# Patient Record
Sex: MALE | Race: WHITE | NOT HISPANIC OR LATINO | Employment: OTHER | URBAN - METROPOLITAN AREA
[De-identification: names, ages, dates, MRNs, and addresses within clinical notes are randomized per-mention and may not be internally consistent; named-entity substitution may affect disease eponyms.]

---

## 2023-02-22 LAB
ALANINE AMINOTRANSFERASE (SGPT) (U/L) IN SER/PLAS: 20 U/L (ref 10–52)
ALBUMIN (G/DL) IN SER/PLAS: 4.4 G/DL (ref 3.4–5)
ALKALINE PHOSPHATASE (U/L) IN SER/PLAS: 59 U/L (ref 33–136)
ANION GAP IN SER/PLAS: 9 MMOL/L (ref 10–20)
ASPARTATE AMINOTRANSFERASE (SGOT) (U/L) IN SER/PLAS: 19 U/L (ref 9–39)
BILIRUBIN TOTAL (MG/DL) IN SER/PLAS: 0.6 MG/DL (ref 0–1.2)
CALCIUM (MG/DL) IN SER/PLAS: 9.7 MG/DL (ref 8.6–10.3)
CARBON DIOXIDE, TOTAL (MMOL/L) IN SER/PLAS: 30 MMOL/L (ref 21–32)
CHLORIDE (MMOL/L) IN SER/PLAS: 102 MMOL/L (ref 98–107)
CHOLESTEROL (MG/DL) IN SER/PLAS: 160 MG/DL (ref 0–199)
CHOLESTEROL IN HDL (MG/DL) IN SER/PLAS: 57.7 MG/DL
CHOLESTEROL/HDL RATIO: 2.8
CREATININE (MG/DL) IN SER/PLAS: 1.01 MG/DL (ref 0.5–1.3)
ERYTHROCYTE DISTRIBUTION WIDTH (RATIO) BY AUTOMATED COUNT: 12.5 % (ref 11.5–14.5)
ERYTHROCYTE MEAN CORPUSCULAR HEMOGLOBIN CONCENTRATION (G/DL) BY AUTOMATED: 32.9 G/DL (ref 32–36)
ERYTHROCYTE MEAN CORPUSCULAR VOLUME (FL) BY AUTOMATED COUNT: 93 FL (ref 80–100)
ERYTHROCYTES (10*6/UL) IN BLOOD BY AUTOMATED COUNT: 4.49 X10E12/L (ref 4.5–5.9)
GFR MALE: 81 ML/MIN/1.73M2
GLUCOSE (MG/DL) IN SER/PLAS: 102 MG/DL (ref 74–99)
HEMATOCRIT (%) IN BLOOD BY AUTOMATED COUNT: 41.6 % (ref 41–52)
HEMOGLOBIN (G/DL) IN BLOOD: 13.7 G/DL (ref 13.5–17.5)
LDL: 79 MG/DL (ref 0–99)
LEUKOCYTES (10*3/UL) IN BLOOD BY AUTOMATED COUNT: 6.7 X10E9/L (ref 4.4–11.3)
PLATELETS (10*3/UL) IN BLOOD AUTOMATED COUNT: 234 X10E9/L (ref 150–450)
POTASSIUM (MMOL/L) IN SER/PLAS: 4.2 MMOL/L (ref 3.5–5.3)
PROSTATE SPECIFIC ANTIGEN,SCREEN: 1.94 NG/ML (ref 0–4)
PROTEIN TOTAL: 7.4 G/DL (ref 6.4–8.2)
SODIUM (MMOL/L) IN SER/PLAS: 137 MMOL/L (ref 136–145)
TRIGLYCERIDE (MG/DL) IN SER/PLAS: 115 MG/DL (ref 0–149)
UREA NITROGEN (MG/DL) IN SER/PLAS: 16 MG/DL (ref 6–23)
VLDL: 23 MG/DL (ref 0–40)

## 2023-03-17 DIAGNOSIS — I10 HYPERTENSION, UNSPECIFIED TYPE: ICD-10-CM

## 2023-03-17 RX ORDER — METOPROLOL SUCCINATE 25 MG/1
25 TABLET, EXTENDED RELEASE ORAL DAILY
Qty: 90 TABLET | Refills: 3 | Status: SHIPPED | OUTPATIENT
Start: 2023-03-17 | End: 2023-06-05 | Stop reason: SDUPTHER

## 2023-03-17 RX ORDER — METOPROLOL SUCCINATE 25 MG/1
25 TABLET, EXTENDED RELEASE ORAL DAILY
COMMUNITY
Start: 2023-02-23 | End: 2023-03-17 | Stop reason: SDUPTHER

## 2023-05-04 DIAGNOSIS — N52.9 ERECTILE DYSFUNCTION, UNSPECIFIED ERECTILE DYSFUNCTION TYPE: ICD-10-CM

## 2023-05-04 RX ORDER — TADALAFIL 20 MG/1
20 TABLET ORAL DAILY PRN
COMMUNITY
Start: 2023-02-23 | End: 2023-05-04 | Stop reason: SDUPTHER

## 2023-05-04 RX ORDER — TADALAFIL 20 MG/1
20 TABLET ORAL DAILY PRN
Qty: 10 TABLET | Refills: 3 | Status: SHIPPED | OUTPATIENT
Start: 2023-05-04 | End: 2023-05-10 | Stop reason: SDUPTHER

## 2023-05-09 ENCOUNTER — TELEPHONE (OUTPATIENT)
Dept: PRIMARY CARE | Facility: CLINIC | Age: 68
End: 2023-05-09
Payer: COMMERCIAL

## 2023-05-10 ENCOUNTER — OFFICE VISIT (OUTPATIENT)
Dept: PRIMARY CARE | Facility: CLINIC | Age: 68
End: 2023-05-10
Payer: COMMERCIAL

## 2023-05-10 VITALS
HEIGHT: 71 IN | RESPIRATION RATE: 16 BRPM | WEIGHT: 238 LBS | TEMPERATURE: 98.3 F | SYSTOLIC BLOOD PRESSURE: 142 MMHG | DIASTOLIC BLOOD PRESSURE: 82 MMHG | BODY MASS INDEX: 33.32 KG/M2 | HEART RATE: 84 BPM

## 2023-05-10 DIAGNOSIS — M54.31 SCIATICA OF RIGHT SIDE: Primary | ICD-10-CM

## 2023-05-10 DIAGNOSIS — N52.9 ERECTILE DYSFUNCTION, UNSPECIFIED ERECTILE DYSFUNCTION TYPE: ICD-10-CM

## 2023-05-10 PROCEDURE — 3077F SYST BP >= 140 MM HG: CPT | Performed by: FAMILY MEDICINE

## 2023-05-10 PROCEDURE — 1159F MED LIST DOCD IN RCRD: CPT | Performed by: FAMILY MEDICINE

## 2023-05-10 PROCEDURE — 99214 OFFICE O/P EST MOD 30 MIN: CPT | Performed by: FAMILY MEDICINE

## 2023-05-10 PROCEDURE — 3079F DIAST BP 80-89 MM HG: CPT | Performed by: FAMILY MEDICINE

## 2023-05-10 PROCEDURE — 1036F TOBACCO NON-USER: CPT | Performed by: FAMILY MEDICINE

## 2023-05-10 RX ORDER — MELOXICAM 15 MG/1
15 TABLET ORAL DAILY
Qty: 14 TABLET | Refills: 0 | Status: SHIPPED | OUTPATIENT
Start: 2023-05-10 | End: 2023-05-24

## 2023-05-10 RX ORDER — FEXOFENADINE HCL AND PSEUDOEPHEDRINE HCI 60; 120 MG/1; MG/1
1 TABLET, EXTENDED RELEASE ORAL EVERY 12 HOURS
COMMUNITY

## 2023-05-10 RX ORDER — CYCLOBENZAPRINE HCL 10 MG
1 TABLET ORAL 3 TIMES DAILY
COMMUNITY

## 2023-05-10 RX ORDER — TADALAFIL 20 MG/1
20 TABLET ORAL DAILY PRN
Qty: 10 TABLET | Refills: 3 | Status: SHIPPED | OUTPATIENT
Start: 2023-05-10

## 2023-05-10 RX ORDER — ACETAMINOPHEN 500 MG
100 TABLET ORAL
COMMUNITY
Start: 2023-02-23

## 2023-05-10 RX ORDER — LOSARTAN POTASSIUM 100 MG/1
1 TABLET ORAL DAILY
COMMUNITY
End: 2023-12-11

## 2023-05-10 RX ORDER — MELOXICAM 7.5 MG/1
1 TABLET ORAL DAILY
COMMUNITY
End: 2023-12-11

## 2023-05-10 RX ORDER — ROSUVASTATIN CALCIUM 40 MG/1
1 TABLET, COATED ORAL DAILY
COMMUNITY
End: 2023-12-11

## 2023-05-10 RX ORDER — MULTIVITAMIN
1 TABLET ORAL DAILY
COMMUNITY

## 2023-05-10 ASSESSMENT — ENCOUNTER SYMPTOMS: BACK PAIN: 1

## 2023-05-10 NOTE — PROGRESS NOTES
Subjective   Dinh Parisi is a 67 y.o. male who presents for Back Pain.  Back Pain  This is a new problem. Episode onset: 2 weeks ago. The problem occurs constantly. The problem is unchanged. The pain is present in the lumbar spine. Radiates to: right leg.   He reports an episode 3 weeks ago where he was carrying a heavy casket which is unusual for him.  Shortly after that he developed pain in the right lower back and buttock area radiating down the lateral thigh all the way to the foot with some intermittent numbness and tingling sensation.  The pain is much worse on standing and gets worse with prolonged standing.  It is relieved almost immediately with sitting and is not bothering him on lying down.  He does not report any prior history of back disease or injury.  Visit Vitals  /82   Pulse 84   Temp 36.8 °C (98.3 °F)   Resp 16      Objective   Physical Exam  Constitutional:       Appearance: Normal appearance.   Pulmonary:      Effort: Pulmonary effort is normal.   Musculoskeletal:      Cervical back: No edema, erythema or tenderness. Normal range of motion.      Thoracic back: No spasms, tenderness or bony tenderness. Normal range of motion.      Lumbar back: No spasms, tenderness or bony tenderness. Normal range of motion. Positive right straight leg raise test. Negative left straight leg raise test.   Skin:     General: Skin is warm and dry.         Assessment/Plan    Problem List Items Addressed This Visit       Erectile dysfunction    Relevant Medications    tadalafil 20 mg tablet     Other Visit Diagnoses       Sciatica of right side    -  Primary    Relevant Medications    meloxicam (Mobic) 15 mg tablet        Clinical exam is highly consistent with sciatica of the right side.  Because this is worse on standing and better on sitting, it is likely that it is a muscle spasm rather than lumbar disc disease.  The pattern is consistent with piriformis which could have been exacerbated by carrying the  clemencia 3 weeks ago.  We will increase the dose of meloxicam to 15 mg for the next 2 weeks, add Flexeril as needed.  I am recommending heat and stretching exercises and have given him a handout for lumbar back exercises.  If this does not resolve the pain within the next 1 to 2 weeks we would do a formal physical therapy referral

## 2023-05-31 ENCOUNTER — TELEPHONE (OUTPATIENT)
Dept: PRIMARY CARE | Facility: CLINIC | Age: 68
End: 2023-05-31
Payer: COMMERCIAL

## 2023-05-31 DIAGNOSIS — M54.31 SCIATICA OF RIGHT SIDE: Primary | ICD-10-CM

## 2023-06-01 NOTE — TELEPHONE ENCOUNTER
I placed a phone call to Dinh Parisi.  We discussed the fact that physical therapy is generally done before considering an epidural steroid injection.  This is due to the fact that studies show the majority of patients will resolve their symptoms with physical therapy and do not need to move to epidural injection.  I advised him to give this 2 to 3 weeks.  If he sees no improvement at all then we we will expect a message back and initiate imaging and referral to pain management

## 2023-06-05 DIAGNOSIS — I10 HYPERTENSION, UNSPECIFIED TYPE: ICD-10-CM

## 2023-06-05 RX ORDER — METOPROLOL SUCCINATE 25 MG/1
37.5 TABLET, EXTENDED RELEASE ORAL DAILY
Qty: 135 TABLET | Refills: 3 | Status: SHIPPED | OUTPATIENT
Start: 2023-06-05 | End: 2023-07-25 | Stop reason: SDUPTHER

## 2023-07-13 ENCOUNTER — HOSPITAL ENCOUNTER (OUTPATIENT)
Dept: DATA CONVERSION | Facility: HOSPITAL | Age: 68
End: 2023-07-13
Attending: PAIN MEDICINE | Admitting: PAIN MEDICINE
Payer: COMMERCIAL

## 2023-07-13 DIAGNOSIS — M54.16 RADICULOPATHY, LUMBAR REGION: ICD-10-CM

## 2023-07-13 DIAGNOSIS — Z87.891 PERSONAL HISTORY OF NICOTINE DEPENDENCE: ICD-10-CM

## 2023-07-13 DIAGNOSIS — I10 ESSENTIAL (PRIMARY) HYPERTENSION: ICD-10-CM

## 2023-07-13 DIAGNOSIS — G47.30 SLEEP APNEA, UNSPECIFIED: ICD-10-CM

## 2023-07-13 DIAGNOSIS — E78.00 PURE HYPERCHOLESTEROLEMIA, UNSPECIFIED: ICD-10-CM

## 2023-07-13 DIAGNOSIS — Z96.659 PRESENCE OF UNSPECIFIED ARTIFICIAL KNEE JOINT: ICD-10-CM

## 2023-07-25 DIAGNOSIS — I10 HYPERTENSION, UNSPECIFIED TYPE: ICD-10-CM

## 2023-07-25 RX ORDER — METOPROLOL SUCCINATE 25 MG/1
37.5 TABLET, EXTENDED RELEASE ORAL DAILY
Qty: 135 TABLET | Refills: 3 | Status: SHIPPED | OUTPATIENT
Start: 2023-07-25 | End: 2024-01-29 | Stop reason: SDUPTHER

## 2023-08-24 ENCOUNTER — TELEPHONE (OUTPATIENT)
Dept: PRIMARY CARE | Facility: CLINIC | Age: 68
End: 2023-08-24
Payer: COMMERCIAL

## 2023-08-24 NOTE — TELEPHONE ENCOUNTER
Daughter called and stated pt accidentally took 2 of his Metoprolol today.  (Total of 50 vs 25)  They are monitoring him and he seems ok.  (Was a little faint earlier) But they have been with him making sure he is ok.   She just wants to make sure there is nothing else they should do?

## 2023-09-19 PROBLEM — R00.2 PALPITATIONS: Status: ACTIVE | Noted: 2023-09-19

## 2023-09-19 PROBLEM — G47.33 OBSTRUCTIVE SLEEP APNEA: Status: ACTIVE | Noted: 2023-09-19

## 2023-09-19 PROBLEM — K21.9 GASTROESOPHAGEAL REFLUX DISEASE WITHOUT ESOPHAGITIS: Status: ACTIVE | Noted: 2023-09-19

## 2023-09-19 PROBLEM — H10.9 CONJUNCTIVITIS, BACTERIAL: Status: ACTIVE | Noted: 2023-09-19

## 2023-09-19 PROBLEM — R07.89 ANTERIOR CHEST WALL PAIN: Status: ACTIVE | Noted: 2023-09-19

## 2023-09-19 PROBLEM — M54.50 LUMBAGO: Status: ACTIVE | Noted: 2023-09-19

## 2023-09-19 PROBLEM — I47.10 PAROXYSMAL SVT (SUPRAVENTRICULAR TACHYCARDIA) (CMS-HCC): Status: ACTIVE | Noted: 2023-09-19

## 2023-09-19 PROBLEM — M76.62 ACHILLES TENDINITIS OF LEFT LOWER EXTREMITY: Status: ACTIVE | Noted: 2023-09-19

## 2023-09-19 PROBLEM — J30.2 SEASONAL ALLERGIES: Status: ACTIVE | Noted: 2023-09-19

## 2023-09-19 PROBLEM — R94.31 ABNORMAL ECG: Status: ACTIVE | Noted: 2023-09-19

## 2023-09-19 PROBLEM — E78.5 HYPERLIPIDEMIA: Status: ACTIVE | Noted: 2023-09-19

## 2023-09-19 PROBLEM — R40.0 DAYTIME SOMNOLENCE: Status: ACTIVE | Noted: 2023-09-19

## 2023-09-19 RX ORDER — TRIAMCINOLONE ACETONIDE 55 UG/1
2 SPRAY, METERED NASAL DAILY
COMMUNITY

## 2023-09-19 RX ORDER — POTASSIUM CHLORIDE 750 MG/1
TABLET, FILM COATED, EXTENDED RELEASE ORAL
COMMUNITY
Start: 2023-02-23

## 2023-09-19 RX ORDER — AZELASTINE 1 MG/ML
SPRAY, METERED NASAL
COMMUNITY

## 2023-09-19 RX ORDER — ASPIRIN 81 MG/1
1 TABLET ORAL DAILY
COMMUNITY

## 2023-09-19 RX ORDER — GABAPENTIN 300 MG/1
1 CAPSULE ORAL 3 TIMES DAILY
COMMUNITY
Start: 2023-06-19

## 2023-09-29 VITALS — BODY MASS INDEX: 32.1 KG/M2 | HEIGHT: 71 IN | WEIGHT: 229.28 LBS

## 2023-10-02 NOTE — OP NOTE
PROCEDURE DETAILS    Preoperative Diagnosis:  Lumbar radicular syndrome, M54.16    Postoperative Diagnosis:  Lumbar radicular syndrome, M54.16    Surgeon: Gabriele Chery  Resident/Fellow/Other Assistant: Iris Martinez    Procedure:  Lumbar epidural steroid injection under fluoroscopic guidance     Anesthesia: No anesthesiologist associated with this case  Estimated Blood Loss: 0  Findings: None         Operative Report:       Operation  Midline lumbar epidural steroid injection. Level performed L4-L5     Surgical indications  The patient is here today to receive an epidural steroid injection to assist with pain.    Operative report  The patient was taken to the procedure room, was placed into a prone positioning. The lumbar area was prepped and draped sterilely in the normal fashion. Under fluoroscopic guidance the epidural space was identified. The skin and subcutaneous tissue was  anesthetized with 1% lidocaine. An 18-gauge Tuohy needle was introduced using the normal saline loss-of-resistance technique. Once the loss of resistance had been achieved, final positioning of the needle was confirmed with negative aspiration of heme  and CSF, with the injection of contrast material showing spread in the epidural space, confirmed in AP and lateral view. Then the patient received 80 mg of Depo-Medrol diluted into normal saline preservative-free and 2 mL of 0.25% bupivacaine making total  volume of 8 mL. The patient tolerated the procedure well, was taken to the recovery room, allowed to recover sufficient amount of time and then was discharged home in stable condition. The patient was advised to [facility]up with [facility]  to reassess improvement on as-needed basis.    Thank you for allowing me to participate in the care of this patient.                        Attestation:   Note Completion:  Attending Attestation I performed the procedure without a resident         Electronic Signatures:  Vik  Gabriele MENDOZA)  (Signed 13-Jul-2023 10:08)   Authored: Post-Operative Note, Chart Review, Note Completion      Last Updated: 13-Jul-2023 10:08 by Gabriele Chery)

## 2023-10-19 ENCOUNTER — APPOINTMENT (OUTPATIENT)
Dept: CARDIOLOGY | Facility: CLINIC | Age: 68
End: 2023-10-19
Payer: COMMERCIAL

## 2023-12-09 DIAGNOSIS — G89.29 CHRONIC BILATERAL LOW BACK PAIN, UNSPECIFIED WHETHER SCIATICA PRESENT: Primary | ICD-10-CM

## 2023-12-09 DIAGNOSIS — M54.50 CHRONIC BILATERAL LOW BACK PAIN, UNSPECIFIED WHETHER SCIATICA PRESENT: Primary | ICD-10-CM

## 2023-12-09 DIAGNOSIS — E78.5 HYPERLIPIDEMIA, UNSPECIFIED HYPERLIPIDEMIA TYPE: ICD-10-CM

## 2023-12-09 DIAGNOSIS — I15.9 SECONDARY HYPERTENSION: ICD-10-CM

## 2023-12-11 RX ORDER — LOSARTAN POTASSIUM 100 MG/1
100 TABLET ORAL DAILY
Qty: 90 TABLET | Refills: 3 | Status: SHIPPED | OUTPATIENT
Start: 2023-12-11 | End: 2024-05-01 | Stop reason: SDUPTHER

## 2023-12-11 RX ORDER — ROSUVASTATIN CALCIUM 40 MG/1
40 TABLET, COATED ORAL DAILY
Qty: 90 TABLET | Refills: 3 | Status: SHIPPED | OUTPATIENT
Start: 2023-12-11 | End: 2024-05-01 | Stop reason: SDUPTHER

## 2023-12-11 RX ORDER — MELOXICAM 7.5 MG/1
7.5 TABLET ORAL DAILY
Qty: 90 TABLET | Refills: 3 | Status: SHIPPED | OUTPATIENT
Start: 2023-12-11 | End: 2024-05-01 | Stop reason: SDUPTHER

## 2024-01-29 DIAGNOSIS — I10 HYPERTENSION, UNSPECIFIED TYPE: ICD-10-CM

## 2024-01-29 RX ORDER — METOPROLOL SUCCINATE 25 MG/1
37.5 TABLET, EXTENDED RELEASE ORAL DAILY
Qty: 135 TABLET | Refills: 3 | Status: SHIPPED | OUTPATIENT
Start: 2024-01-29 | End: 2024-01-30 | Stop reason: SDUPTHER

## 2024-01-30 DIAGNOSIS — I10 HYPERTENSION, UNSPECIFIED TYPE: ICD-10-CM

## 2024-01-30 RX ORDER — METOPROLOL SUCCINATE 25 MG/1
50 TABLET, EXTENDED RELEASE ORAL DAILY
Qty: 180 TABLET | Refills: 3 | Status: SHIPPED | OUTPATIENT
Start: 2024-01-30 | End: 2024-05-01 | Stop reason: SDUPTHER

## 2024-02-27 ENCOUNTER — TELEPHONE (OUTPATIENT)
Dept: PRIMARY CARE | Facility: CLINIC | Age: 69
End: 2024-02-27
Payer: COMMERCIAL

## 2024-02-27 NOTE — TELEPHONE ENCOUNTER
"Pt is having a dental procedure this afternoon called root planing and he is requesting an antibiotic \"to be safe.\" Please advise.  "

## 2024-05-01 ENCOUNTER — TELEPHONE (OUTPATIENT)
Dept: PRIMARY CARE | Facility: CLINIC | Age: 69
End: 2024-05-01
Payer: COMMERCIAL

## 2024-05-01 DIAGNOSIS — I15.9 SECONDARY HYPERTENSION: ICD-10-CM

## 2024-05-01 DIAGNOSIS — I10 HYPERTENSION, UNSPECIFIED TYPE: ICD-10-CM

## 2024-05-01 DIAGNOSIS — M54.50 CHRONIC BILATERAL LOW BACK PAIN, UNSPECIFIED WHETHER SCIATICA PRESENT: ICD-10-CM

## 2024-05-01 DIAGNOSIS — E78.5 HYPERLIPIDEMIA, UNSPECIFIED HYPERLIPIDEMIA TYPE: ICD-10-CM

## 2024-05-01 DIAGNOSIS — G89.29 CHRONIC BILATERAL LOW BACK PAIN, UNSPECIFIED WHETHER SCIATICA PRESENT: ICD-10-CM

## 2024-05-01 RX ORDER — LOSARTAN POTASSIUM 100 MG/1
100 TABLET ORAL DAILY
Qty: 90 TABLET | Refills: 3 | Status: SHIPPED | OUTPATIENT
Start: 2024-05-01

## 2024-05-01 RX ORDER — METOPROLOL SUCCINATE 25 MG/1
50 TABLET, EXTENDED RELEASE ORAL DAILY
Qty: 180 TABLET | Refills: 3 | Status: SHIPPED | OUTPATIENT
Start: 2024-05-01 | End: 2025-05-01

## 2024-05-01 RX ORDER — MELOXICAM 7.5 MG/1
7.5 TABLET ORAL DAILY
Qty: 90 TABLET | Refills: 3 | Status: SHIPPED | OUTPATIENT
Start: 2024-05-01

## 2024-05-01 RX ORDER — ROSUVASTATIN CALCIUM 40 MG/1
40 TABLET, COATED ORAL DAILY
Qty: 90 TABLET | Refills: 3 | Status: SHIPPED | OUTPATIENT
Start: 2024-05-01

## 2024-05-01 NOTE — TELEPHONE ENCOUNTER
Patient stopped in stating that he would like his Toprol XL, Cozaar, Crestor, and Mobic be switched over to [facility] in [facility]. He said that he stopped at the [facility] in [facility] to have them refill his medications and he was told that we had to send over new Rx's to them. If those can be sent at your convenience please and thank you!

## 2024-07-15 ENCOUNTER — OFFICE VISIT (OUTPATIENT)
Dept: PRIMARY CARE | Facility: CLINIC | Age: 69
End: 2024-07-15
Payer: COMMERCIAL

## 2024-07-15 VITALS
WEIGHT: 225 LBS | DIASTOLIC BLOOD PRESSURE: 80 MMHG | BODY MASS INDEX: 31.38 KG/M2 | OXYGEN SATURATION: 97 % | HEART RATE: 80 BPM | SYSTOLIC BLOOD PRESSURE: 124 MMHG | TEMPERATURE: 97.5 F

## 2024-07-15 DIAGNOSIS — R21 RASH DUE TO ALLERGY: Primary | ICD-10-CM

## 2024-07-15 DIAGNOSIS — T78.40XA RASH DUE TO ALLERGY: Primary | ICD-10-CM

## 2024-07-15 PROCEDURE — 1159F MED LIST DOCD IN RCRD: CPT | Performed by: FAMILY MEDICINE

## 2024-07-15 PROCEDURE — 1036F TOBACCO NON-USER: CPT | Performed by: FAMILY MEDICINE

## 2024-07-15 PROCEDURE — 3074F SYST BP LT 130 MM HG: CPT | Performed by: FAMILY MEDICINE

## 2024-07-15 PROCEDURE — 99214 OFFICE O/P EST MOD 30 MIN: CPT | Performed by: FAMILY MEDICINE

## 2024-07-15 PROCEDURE — 3079F DIAST BP 80-89 MM HG: CPT | Performed by: FAMILY MEDICINE

## 2024-07-15 PROCEDURE — 1160F RVW MEDS BY RX/DR IN RCRD: CPT | Performed by: FAMILY MEDICINE

## 2024-07-15 RX ORDER — HYDROXYZINE HYDROCHLORIDE 10 MG/1
10 TABLET, FILM COATED ORAL EVERY 8 HOURS PRN
Qty: 30 TABLET | Refills: 0 | Status: SHIPPED | OUTPATIENT
Start: 2024-07-15 | End: 2024-07-25

## 2024-07-15 RX ORDER — METHYLPREDNISOLONE 4 MG/1
TABLET ORAL
Qty: 21 TABLET | Refills: 0 | Status: SHIPPED | OUTPATIENT
Start: 2024-07-15 | End: 2024-07-22

## 2024-07-15 ASSESSMENT — ENCOUNTER SYMPTOMS
VOMITING: 0
SORE THROAT: 0
FEVER: 0
DIARRHEA: 1

## 2024-07-15 NOTE — PROGRESS NOTES
Subjective   Patient ID: Dinh Parisi is a 68 y.o. male who presents for Rash (Red welt this morning/\took benadryl).    Rash  This is a new problem. The current episode started yesterday. The problem has been gradually worsening since onset. The affected locations include the left upper leg, left lower leg, left arm, left elbow, torso, back, right upper leg, right lower leg and right arm. The rash is characterized by redness and swelling (welted rash). It is unknown if there was an exposure to a precipitant. Associated symptoms include diarrhea. Pertinent negatives include no fever, joint pain, sore throat or vomiting. Treatments tried: benadryl. The treatment provided moderate relief.        Review of Systems   Constitutional:  Negative for fever.   HENT:  Negative for sore throat.    Gastrointestinal:  Positive for diarrhea. Negative for vomiting.   Musculoskeletal:  Negative for joint pain.   Skin:  Positive for rash.        Rash x 1 d   No new products or foods, no gardening or yard work  No travels  Di use clorox   Pt has rash on arms and legs, red and blotchy  Very itchy  Took 50 mg of benedryl last night and this am  Helped with itching and rash       Objective   /80   Pulse 80   Temp 36.4 °C (97.5 °F)   Wt 102 kg (225 lb)   SpO2 97%   BMI 31.38 kg/m²     Physical Exam  Vitals and nursing note reviewed.   Constitutional:       General: He is not in acute distress.     Appearance: Normal appearance.   HENT:      Head: Normocephalic and atraumatic.   Cardiovascular:      Rate and Rhythm: Normal rate and regular rhythm.      Heart sounds: Normal heart sounds.   Pulmonary:      Effort: Pulmonary effort is normal.      Breath sounds: Normal breath sounds.   Skin:     General: Skin is warm and dry.      Findings: Rash present.      Comments: Residual maculopapular rash on LUE x 3-4  No hives, vesicles , pustules or ulcerations   Neurological:      Mental Status: He is alert.          Assessment/Plan   Problem List Items Addressed This Visit             ICD-10-CM    Rash due to allergy - Primary T78.40XA, R21     Pt to take meds as directed  Avoid new products or foods  F/up with pcp if no improvement         Relevant Medications    methylPREDNISolone (Medrol Dospak) 4 mg tablets    hydrOXYzine HCL (Atarax) 10 mg tablet

## 2024-11-27 ENCOUNTER — OFFICE VISIT (OUTPATIENT)
Dept: PRIMARY CARE | Facility: CLINIC | Age: 69
End: 2024-11-27
Payer: COMMERCIAL

## 2024-11-27 VITALS
TEMPERATURE: 98 F | DIASTOLIC BLOOD PRESSURE: 82 MMHG | HEIGHT: 71 IN | BODY MASS INDEX: 32.9 KG/M2 | RESPIRATION RATE: 18 BRPM | WEIGHT: 235 LBS | HEART RATE: 78 BPM | SYSTOLIC BLOOD PRESSURE: 130 MMHG

## 2024-11-27 DIAGNOSIS — Z13.220 SCREENING FOR HYPERLIPIDEMIA: ICD-10-CM

## 2024-11-27 DIAGNOSIS — Z12.5 SCREENING FOR PROSTATE CANCER: ICD-10-CM

## 2024-11-27 DIAGNOSIS — Z11.59 NEED FOR HEPATITIS C SCREENING TEST: ICD-10-CM

## 2024-11-27 DIAGNOSIS — Z23 NEED FOR VACCINATION: ICD-10-CM

## 2024-11-27 DIAGNOSIS — Z87.891 PERSONAL HISTORY OF TOBACCO USE, PRESENTING HAZARDS TO HEALTH: ICD-10-CM

## 2024-11-27 DIAGNOSIS — S80.01XA CONTUSION OF RIGHT KNEE, INITIAL ENCOUNTER: ICD-10-CM

## 2024-11-27 DIAGNOSIS — Z00.00 ROUTINE GENERAL MEDICAL EXAMINATION AT HEALTH CARE FACILITY: Primary | ICD-10-CM

## 2024-11-27 PROCEDURE — 1158F ADVNC CARE PLAN TLK DOCD: CPT | Performed by: FAMILY MEDICINE

## 2024-11-27 PROCEDURE — 1123F ACP DISCUSS/DSCN MKR DOCD: CPT | Performed by: FAMILY MEDICINE

## 2024-11-27 PROCEDURE — 3075F SYST BP GE 130 - 139MM HG: CPT | Performed by: FAMILY MEDICINE

## 2024-11-27 PROCEDURE — 90471 IMMUNIZATION ADMIN: CPT | Performed by: FAMILY MEDICINE

## 2024-11-27 PROCEDURE — 1159F MED LIST DOCD IN RCRD: CPT | Performed by: FAMILY MEDICINE

## 2024-11-27 PROCEDURE — 3079F DIAST BP 80-89 MM HG: CPT | Performed by: FAMILY MEDICINE

## 2024-11-27 PROCEDURE — 1170F FXNL STATUS ASSESSED: CPT | Performed by: FAMILY MEDICINE

## 2024-11-27 PROCEDURE — 3008F BODY MASS INDEX DOCD: CPT | Performed by: FAMILY MEDICINE

## 2024-11-27 PROCEDURE — 1036F TOBACCO NON-USER: CPT | Performed by: FAMILY MEDICINE

## 2024-11-27 PROCEDURE — 90677 PCV20 VACCINE IM: CPT | Performed by: FAMILY MEDICINE

## 2024-11-27 PROCEDURE — G0439 PPPS, SUBSEQ VISIT: HCPCS | Performed by: FAMILY MEDICINE

## 2024-11-27 PROCEDURE — 99497 ADVNCD CARE PLAN 30 MIN: CPT | Performed by: FAMILY MEDICINE

## 2024-11-27 ASSESSMENT — PATIENT HEALTH QUESTIONNAIRE - PHQ9
1. LITTLE INTEREST OR PLEASURE IN DOING THINGS: NOT AT ALL
2. FEELING DOWN, DEPRESSED OR HOPELESS: NOT AT ALL
SUM OF ALL RESPONSES TO PHQ9 QUESTIONS 1 AND 2: 0

## 2024-11-27 ASSESSMENT — ACTIVITIES OF DAILY LIVING (ADL)
TAKING_MEDICATION: INDEPENDENT
BATHING: INDEPENDENT
DOING_HOUSEWORK: INDEPENDENT
GROCERY_SHOPPING: INDEPENDENT
MANAGING_FINANCES: INDEPENDENT
DRESSING: INDEPENDENT

## 2024-11-27 ASSESSMENT — ENCOUNTER SYMPTOMS
LOSS OF SENSATION IN FEET: 0
OCCASIONAL FEELINGS OF UNSTEADINESS: 0
DEPRESSION: 0

## 2024-11-27 NOTE — PROGRESS NOTES
"Subjective   Reason for Visit: Dinh Parisi Jr. is an 69 y.o. male here for a Medicare Wellness visit.     Past Medical, Surgical, and Family History reviewed and updated in chart.    Reviewed all medications by prescribing practitioner or clinical pharmacist (such as prescriptions, OTCs, herbal therapies and supplements) and documented in the medical record.    Knee Pain   Incident onset: 11 days ago. The incident occurred in the yard. The injury mechanism was a fall. The pain is present in the right knee. The pain has been Constant since onset.       Patient Care Team:  Juan Pablo Giraldo MD as PCP - General     Review of Systems    Objective   Vitals:  /82   Pulse 78   Temp 36.7 °C (98 °F)   Resp 18   Ht 1.803 m (5' 11\")   Wt 107 kg (235 lb)   BMI 32.78 kg/m²       Physical Exam  Constitutional:       Appearance: Normal appearance.   HENT:      Head: Normocephalic.   Eyes:      Conjunctiva/sclera: Conjunctivae normal.   Cardiovascular:      Rate and Rhythm: Normal rate and regular rhythm.      Heart sounds: Normal heart sounds.   Pulmonary:      Effort: Pulmonary effort is normal.      Breath sounds: Normal breath sounds.   Musculoskeletal:      Cervical back: Neck supple.   Skin:     General: Skin is warm and dry.   Neurological:      Mental Status: He is alert.       Assessment/Plan   Assessment & Plan  Routine general medical examination at health care facility    Orders:    1 Year Follow Up In [facility] - PCP - Wellness Exam; Future    Comprehensive Metabolic Panel; Future    Personal history of tobacco use, presenting hazards to health    Orders:    AAA Screening, Vascular US Abdominal Aorta; Future    CT lung screening low dose; Future    Need for vaccination    Orders:    Pneumococcal vaccine 20-valent    Need for hepatitis C screening test    Orders:    Hepatitis C Antibody; Future    Screening for hyperlipidemia    Orders:    Lipid Panel; Future    Screening for prostate " cancer    Orders:    PSA screen; Future    Contusion of right knee, initial encounter  He fell on his knee on the right side about 2 weeks ago and had significant swelling and loss of range of motion.  His range of motion is essentially improved to about 95% of normal and the pain is significantly improved.  There is some distal edema there is about 1+ pitting on the ankle.  I suspect that he had some cartilage injury which is slowly healing.  Since this has gotten dramatically better over the last 2 weeks I recommend increasing the anti-inflammatory medication and wearing compression sleeve.  If this does not resolve or significantly improve over the next 3 or 4 weeks then a referral to orthopedics or additional imaging would be indicated            Health Counseling    Advance Directives Discussion  16 - 20 minutes were spent discussing Advanced Care Planning (including a Living Will, Medical Power Of , as well as specific end of life choices and/or directives). The details of that discussion were documented in Advanced Directives Discussion section of the medical record.

## 2024-12-12 ENCOUNTER — TELEPHONE (OUTPATIENT)
Dept: PRIMARY CARE | Facility: CLINIC | Age: 69
End: 2024-12-12
Payer: MEDICARE

## 2024-12-12 DIAGNOSIS — S80.01XA CONTUSION OF RIGHT KNEE, INITIAL ENCOUNTER: Primary | ICD-10-CM

## 2024-12-12 NOTE — TELEPHONE ENCOUNTER
Patient is having extreme knee pain, struggling to walk  Wife is wondering if a MRI can be ordered,   She states he needs further care.    Please advise as to what the next step would be    [phone] Radha

## 2024-12-13 ENCOUNTER — HOSPITAL ENCOUNTER (OUTPATIENT)
Dept: RADIOLOGY | Facility: CLINIC | Age: 69
Discharge: HOME | End: 2024-12-13

## 2024-12-13 ENCOUNTER — TELEPHONE (OUTPATIENT)
Dept: PRIMARY CARE | Facility: CLINIC | Age: 69
End: 2024-12-13
Payer: MEDICARE

## 2024-12-13 DIAGNOSIS — S80.01XA CONTUSION OF RIGHT KNEE, INITIAL ENCOUNTER: ICD-10-CM

## 2024-12-13 DIAGNOSIS — S80.01XA CONTUSION OF RIGHT KNEE, INITIAL ENCOUNTER: Primary | ICD-10-CM

## 2024-12-13 PROCEDURE — 73562 X-RAY EXAM OF KNEE 3: CPT | Mod: RT

## 2024-12-16 ENCOUNTER — APPOINTMENT (OUTPATIENT)
Dept: RADIOLOGY | Facility: CLINIC | Age: 69
End: 2024-12-16

## 2024-12-16 DIAGNOSIS — M17.11 PRIMARY OSTEOARTHRITIS OF RIGHT KNEE: Primary | ICD-10-CM

## 2025-01-06 ENCOUNTER — APPOINTMENT (OUTPATIENT)
Dept: ORTHOPEDIC SURGERY | Facility: CLINIC | Age: 70
End: 2025-01-06
Payer: MEDICARE

## 2025-01-06 ENCOUNTER — TELEPHONE (OUTPATIENT)
Dept: ORTHOPEDIC SURGERY | Facility: CLINIC | Age: 70
End: 2025-01-06

## 2025-01-06 DIAGNOSIS — M17.11 PRIMARY OSTEOARTHRITIS OF RIGHT KNEE: ICD-10-CM

## 2025-01-06 DIAGNOSIS — M15.3 POST-TRAUMATIC OSTEOARTHRITIS OF MULTIPLE JOINTS: ICD-10-CM

## 2025-01-06 PROCEDURE — 99203 OFFICE O/P NEW LOW 30 MIN: CPT | Performed by: ORTHOPAEDIC SURGERY

## 2025-01-06 PROCEDURE — 1159F MED LIST DOCD IN RCRD: CPT | Performed by: ORTHOPAEDIC SURGERY

## 2025-01-06 PROCEDURE — 20610 DRAIN/INJ JOINT/BURSA W/O US: CPT | Performed by: ORTHOPAEDIC SURGERY

## 2025-01-06 RX ORDER — LIDOCAINE HYDROCHLORIDE 10 MG/ML
5 INJECTION, SOLUTION INFILTRATION; PERINEURAL
Status: COMPLETED | OUTPATIENT
Start: 2025-01-06 | End: 2025-01-06

## 2025-01-06 RX ORDER — BETAMETHASONE SODIUM PHOSPHATE AND BETAMETHASONE ACETATE 3; 3 MG/ML; MG/ML
2 INJECTION, SUSPENSION INTRA-ARTICULAR; INTRALESIONAL; INTRAMUSCULAR; SOFT TISSUE
Status: COMPLETED | OUTPATIENT
Start: 2025-01-06 | End: 2025-01-06

## 2025-01-06 RX ADMIN — LIDOCAINE HYDROCHLORIDE 5 ML: 10 INJECTION, SOLUTION INFILTRATION; PERINEURAL at 14:20

## 2025-01-06 RX ADMIN — BETAMETHASONE SODIUM PHOSPHATE AND BETAMETHASONE ACETATE 2 ML: 3; 3 INJECTION, SUSPENSION INTRA-ARTICULAR; INTRALESIONAL; INTRAMUSCULAR; SOFT TISSUE at 14:20

## 2025-01-06 NOTE — PROGRESS NOTES
History of present: History of ACL reconstruction many years ago posttraumatic arthritis advancing varus deformity combination of pain arthritis and now instability        Past medical history:    The patient's past medical history, family history, social history and review of systems were documented on the patient's medical intake form.  The medical intake form was reviewed and scanned into the electronic medical record for future use.  History is otherwise negative except as stated in the history of present illness.    Physical exam:    General: Alert and oriented to person place and time.  No acute distress and breathing comfortably, pleasant and cooperative with examination.    Head and neck exam: Head is normocephalic atraumatic.    Neck: Supple no visible swelling or deformity.    Cardiovascular: Good perfusion to affected extremities without signs or symptoms of chest pain.    Lungs no audible wheezing or labored breathing on examination.    Abdominal exam: Nondistended nontender    Extremities: The affected right knee was examined and inspected and was tender to touch along the medial and lateral aspect with catching, locking or mechanical symptoms.    The skin was intact without breakdown or open wound.  Old incisions of present were healed.    There was a mild Stephanie exam seen with some evidence of instability and weakness in the collateral ligaments with varus valgus stressing and laxity in the anterior or posterior planes.    There was a negative Lachman's test pivot shift test and posterior drawer sign with no foot drop, numbness or tingling.    Sensation, reflexes and pulses in the foot and ankle are preserved.  There was an effusion.  Range of motion showed good straight leg raise with flexion to 150 degrees  and extension to 0 degrees degrees.  The patient had the ability to bear weight but with discomfort.  The patient's gait was antalgic secondary to discomfort      Before aspiration injection  the risks of a cortisone injection including infection, local skin irritation, skin atrophy, calcification, continued pain and discomfort, elevated blood sugar, burning, failure to relieve pain, possible late infection were discussed with the patient.    Postprocedure discomfort can be alleviated with additional medications, ice, elevation, rest over the first 24 hours as recommended.    Patient verbalized understanding and wanted to proceed with the planned procedure.    After informed consent was provided and allergies verified, the patient was positioned appropriately on the bed.  The right knee to be aspirated and injected was prepped and draped in a sterile fashion.  The skin was anesthetized with ethyl chloride spray.  A joint aspiration was to be performed an 18-gauge needle was used otherwise a 22-gauge needle was used to inject the appropriate joint.    Joint injection was performed with a mixture of 5 cc 1% lidocaine plain and 2 cc Celestone Soluspan 6 mg per mL.  The needle was removed and the puncture site closed and sealed with a Band-Aid.  The patient tolerated the procedure well.            Diagnostic studies: X-rays show advanced arthritis severe patellofemoral and medial joint space arthrosis with advancing varus deformity hardware in place      Impression: Right knee advanced arthritis      Plan: Cortisone shot injection    Medial  brace for stability control    The patient was described a medial  brace for the right knee.  The patient is knee has medial compartmental DJD.  Physical examination as stated above is positive for mild laxity with valgus stress.  The patient is ambulatory with or without aid: But has weakness, instability and/or deformity of their right knee which requires stabilization from this orthosis to improve their function      Will see him back in 3 weeks if not improved we will order gel shots to get him through his trip to [place] at the end of February  hopefully we can avoid arthroplasty      L Inj/Asp: R knee on 1/6/2025 2:20 PM  Indications: pain and diagnostic evaluation  Details: 22 G needle, medial approach  Medications: 5 mL lidocaine 10 mg/mL (1 %); 2 mL betamethasone acet,sod phos 6 mg/mL  Outcome: tolerated well, no immediate complications  Procedure, treatment alternatives, risks and benefits explained, specific risks discussed. Consent was given by the patient. Immediately prior to procedure a time out was called to verify the correct patient, procedure, equipment, support staff and site/side marked as required. Patient was prepped and draped in the usual sterile fashion.

## 2025-01-22 ENCOUNTER — APPOINTMENT (OUTPATIENT)
Dept: ORTHOPEDIC SURGERY | Facility: CLINIC | Age: 70
End: 2025-01-22

## 2025-02-09 NOTE — PROGRESS NOTES
History: Dinh is here for his right knee. He has a history of ACL reconstruction many years ago. He was given a cortisone injection in the right knee by Dr. Gerardo Arias on 1/6/25.      Past medical history: Multiple  Medications: Multiple  Allergies: No known drug allergies    Please refer to the intake H&P regarding the patient's review of systems, family history and social history as was done today    HEENT: Normal  Lungs: Clear to auscultation  Heart: RRR  Abdomen: Soft, nontender  Skin: clear  Extremity: []   Contralateral exam is normal for strength, motion, stability and neurovascular assessment.    Radiographs: []     Assessment: []     Plan: []   All questions were answered today with the patient.    Scribe Attestation  By signing my name below, Arpita HERRERA Scribe   attest that this documentation has been prepared under the direction and in the presence of Orlando Arias MD.

## 2025-02-10 ENCOUNTER — OFFICE VISIT (OUTPATIENT)
Dept: ORTHOPEDIC SURGERY | Facility: CLINIC | Age: 70
End: 2025-02-10
Payer: MEDICARE

## 2025-02-10 ENCOUNTER — APPOINTMENT (OUTPATIENT)
Dept: ORTHOPEDIC SURGERY | Facility: CLINIC | Age: 70
End: 2025-02-10

## 2025-02-10 VITALS — HEIGHT: 71 IN | BODY MASS INDEX: 32.9 KG/M2 | WEIGHT: 235 LBS

## 2025-02-10 DIAGNOSIS — M17.11 PRIMARY OSTEOARTHRITIS OF RIGHT KNEE: Primary | ICD-10-CM

## 2025-02-10 PROCEDURE — 20610 DRAIN/INJ JOINT/BURSA W/O US: CPT | Mod: RT | Performed by: ORTHOPAEDIC SURGERY

## 2025-02-10 PROCEDURE — 2500000004 HC RX 250 GENERAL PHARMACY W/ HCPCS (ALT 636 FOR OP/ED): Mod: JZ | Performed by: ORTHOPAEDIC SURGERY

## 2025-02-10 PROCEDURE — 1125F AMNT PAIN NOTED PAIN PRSNT: CPT | Performed by: ORTHOPAEDIC SURGERY

## 2025-02-10 PROCEDURE — 99214 OFFICE O/P EST MOD 30 MIN: CPT | Performed by: ORTHOPAEDIC SURGERY

## 2025-02-10 PROCEDURE — 99213 OFFICE O/P EST LOW 20 MIN: CPT | Mod: 25 | Performed by: ORTHOPAEDIC SURGERY

## 2025-02-10 PROCEDURE — 3008F BODY MASS INDEX DOCD: CPT | Performed by: ORTHOPAEDIC SURGERY

## 2025-02-10 RX ADMIN — Medication 6 ML: at 13:59

## 2025-02-10 ASSESSMENT — PAIN SCALES - GENERAL: PAINLEVEL_OUTOF10: 6

## 2025-02-10 ASSESSMENT — PAIN - FUNCTIONAL ASSESSMENT: PAIN_FUNCTIONAL_ASSESSMENT: 0-10

## 2025-02-10 NOTE — PROGRESS NOTES
History of present illness: History right knee pain known arthritic change posttraumatic in nature prior ACL reconstruction    He is swollen sore got a cortisone shot helped for 3 weeks    He is traveling to [place] so he like an injection today of gel shots    Physical exam:    General: No acute distress or breathing difficulty or discomfort, pleasant and cooperative with the examination.    Extremities: The affected right knee was examined and inspected and was tender to touch along the medial and lateral aspect with catching, locking or mechanical symptoms.    The skin was intact without breakdown or open wound.  Old incisions of present were healed.    There was a mild Stephanie exam seen with some evidence of instability and weakness in the collateral ligaments with varus valgus stressing and laxity in the anterior or posterior planes.    There was a negative Lachman's test pivot shift test and posterior drawer sign with no foot drop, numbness or tingling.    Sensation, reflexes and pulses in the foot and ankle are preserved.  There was an effusion.  Range of motion showed good straight leg raise with flexion to 150 degrees and extension to 0 degrees.  The patient had the ability to bear weight but with discomfort.  The patient's gait was antalgic secondary to discomfort    Before the right injection the benefits of a hyaluronic acid  injection including infection, local skin irritation, skin atrophy, calcification, continued pain and discomfort, elevated blood sugar, burning, failure to relieve pain, possible late infection were discussed with the patient.    Postprocedure discomfort can be alleviated with additional medications, ice, elevation, rest over the first 24 hours as recommended.    Patient verbalized understanding and wanted to proceed with the planned procedure.    After informed consent was provided and allergies verified, the patient was positioned appropriately on the bed.  The RIGHT KNEE to be  injected was prepped and draped in a sterile fashion.  The skin was anesthetized with ethyl chloride spray.   A  22-gauge needle was used to inject the appropriate joint.    Joint injection was performed with Synvisc 1 injection contains 48 mg of hyaluronic acid per 6 mL was performed.  The needle was removed and the puncture site closed and sealed with a Band-Aid.  The patient tolerated the procedure well.        Diagnostic studies: No new    Impression: Right knee advanced arthritis    We did talk about a medial OA brace but he does not have Medicare part B so it would be all out-of-pocket    Plan: Injection today right knee of Synvisc 1 will see him back 4 to 5 weeks if not significantly improved will discuss knee arthroplasty anytime after 4/6/2025       L Inj/Asp: R knee on 2/10/2025 1:59 PM  Indications: pain  Details: 18 G needle, medial approach  Medications: 6 mL hylan 48 mg/6 mL  Outcome: tolerated well, no immediate complications  Procedure, treatment alternatives, risks and benefits explained, specific risks discussed. Consent was given by the patient. Immediately prior to procedure a time out was called to verify the correct patient, procedure, equipment, support staff and site/side marked as required. Patient was prepped and draped in the usual sterile fashion.

## 2025-03-10 ENCOUNTER — OFFICE VISIT (OUTPATIENT)
Dept: ORTHOPEDIC SURGERY | Facility: CLINIC | Age: 70
End: 2025-03-10
Payer: MEDICARE

## 2025-03-10 ENCOUNTER — HOSPITAL ENCOUNTER (OUTPATIENT)
Dept: RADIOLOGY | Facility: CLINIC | Age: 70
Discharge: HOME | End: 2025-03-10
Payer: MEDICARE

## 2025-03-10 DIAGNOSIS — M17.11 PRIMARY OSTEOARTHRITIS OF RIGHT KNEE: ICD-10-CM

## 2025-03-10 PROCEDURE — 99214 OFFICE O/P EST MOD 30 MIN: CPT | Performed by: ORTHOPAEDIC SURGERY

## 2025-03-10 PROCEDURE — 73560 X-RAY EXAM OF KNEE 1 OR 2: CPT | Mod: RT

## 2025-03-10 PROCEDURE — 99213 OFFICE O/P EST LOW 20 MIN: CPT | Performed by: ORTHOPAEDIC SURGERY

## 2025-03-10 PROCEDURE — 73560 X-RAY EXAM OF KNEE 1 OR 2: CPT | Mod: RIGHT SIDE | Performed by: ORTHOPAEDIC SURGERY

## 2025-03-10 NOTE — PROGRESS NOTES
History of present illness: History of advanced arthritis posttraumatic in nature ACL reconstruction advancing varus deformity he had 2 sets of injections a month apart with minimal relief he now has severe pain when he stands he is not interested in wearing a brace all day long he is now using a cane    Physical exam:    General: No acute distress or breathing difficulty or discomfort, pleasant and cooperative with the examination.    Extremities: The affected right knee was examined and inspected and was tender to touch along the medial and lateral aspect with catching, locking or mechanical symptoms.    The skin was intact without breakdown or open wound.  Old incisions of present were healed.    There was a mild Stephanie exam seen with some evidence of instability and weakness in the collateral ligaments with varus valgus stressing and laxity in the anterior or posterior planes.    There was a negative Lachman's test pivot shift test and posterior drawer sign with no foot drop, numbness or tingling.    Sensation, reflexes and pulses in the foot and ankle are preserved.  There was an effusion.  Range of motion showed good straight leg raise with flexion to 150 degrees and extension to 0 degrees.  The patient had the ability to bear weight but with discomfort.  The patient's gait was antalgic secondary to discomfort    Diagnostic studies: X-rays show advanced arthrosis bone-on-bone arthrosis stable in the tibia and interference screw from ACL reconstruction in the femur    Impression: Advanced arthritis varus deformity bone-on-bone arthrosis    Plan: Now for knee replacement with simple hardware removal    PT therapy rehab program described postop program described    Risk and benefits of surgery discussed extensively with the patient.    Surgical risk included but were not limited to infection, wear, loosening, need for further surgery blood clot, failure to heal, failure of the surgery, stiffness, loss of limb  life, extremity function change in length change, and associated risks of  any surgery.    Will see him on the surgery scheduled at our  surgery center for total joint replacement he will spend the night in the stay suites and understands the rehab program following the surgery

## 2025-03-31 ENCOUNTER — APPOINTMENT (OUTPATIENT)
Dept: PRIMARY CARE | Facility: CLINIC | Age: 70
End: 2025-03-31
Payer: MEDICARE

## 2025-04-03 ENCOUNTER — APPOINTMENT (OUTPATIENT)
Dept: ORTHOPEDIC SURGERY | Facility: CLINIC | Age: 70
End: 2025-04-03
Payer: MEDICARE

## 2025-04-04 LAB — PSA SERPL-MCNC: 2.4 NG/ML

## 2025-04-05 LAB
ALBUMIN SERPL-MCNC: 4.4 G/DL (ref 3.6–5.1)
ALP SERPL-CCNC: 46 U/L (ref 35–144)
ALT SERPL-CCNC: 12 U/L (ref 9–46)
ANION GAP SERPL CALCULATED.4IONS-SCNC: 10 MMOL/L (CALC) (ref 7–17)
AST SERPL-CCNC: 14 U/L (ref 10–35)
BILIRUB SERPL-MCNC: 0.6 MG/DL (ref 0.2–1.2)
BUN SERPL-MCNC: 12 MG/DL (ref 7–25)
CALCIUM SERPL-MCNC: 9.3 MG/DL (ref 8.6–10.3)
CHLORIDE SERPL-SCNC: 102 MMOL/L (ref 98–110)
CHOLEST SERPL-MCNC: 141 MG/DL
CHOLEST/HDLC SERPL: 2.8 (CALC)
CO2 SERPL-SCNC: 25 MMOL/L (ref 20–32)
CREAT SERPL-MCNC: 0.76 MG/DL (ref 0.7–1.35)
EGFRCR SERPLBLD CKD-EPI 2021: 97 ML/MIN/1.73M2
GLUCOSE SERPL-MCNC: 93 MG/DL (ref 65–99)
HCV AB SERPL QL IA: NORMAL
HDLC SERPL-MCNC: 51 MG/DL
LDLC SERPL CALC-MCNC: 72 MG/DL (CALC)
NONHDLC SERPL-MCNC: 90 MG/DL (CALC)
POTASSIUM SERPL-SCNC: 4.1 MMOL/L (ref 3.5–5.3)
PROT SERPL-MCNC: 6.5 G/DL (ref 6.1–8.1)
SODIUM SERPL-SCNC: 137 MMOL/L (ref 135–146)
TRIGL SERPL-MCNC: 93 MG/DL

## 2025-04-14 ENCOUNTER — APPOINTMENT (OUTPATIENT)
Dept: ORTHOPEDIC SURGERY | Facility: CLINIC | Age: 70
End: 2025-04-14
Payer: MEDICARE

## 2025-04-18 DIAGNOSIS — E78.5 HYPERLIPIDEMIA, UNSPECIFIED HYPERLIPIDEMIA TYPE: ICD-10-CM

## 2025-04-18 DIAGNOSIS — I15.9 SECONDARY HYPERTENSION: ICD-10-CM

## 2025-04-18 DIAGNOSIS — G89.29 CHRONIC BILATERAL LOW BACK PAIN, UNSPECIFIED WHETHER SCIATICA PRESENT: ICD-10-CM

## 2025-04-18 DIAGNOSIS — M54.50 CHRONIC BILATERAL LOW BACK PAIN, UNSPECIFIED WHETHER SCIATICA PRESENT: ICD-10-CM

## 2025-04-18 DIAGNOSIS — I10 HYPERTENSION, UNSPECIFIED TYPE: ICD-10-CM

## 2025-04-18 RX ORDER — ROSUVASTATIN CALCIUM 40 MG/1
40 TABLET, COATED ORAL DAILY
Qty: 90 TABLET | Refills: 3 | Status: SHIPPED | OUTPATIENT
Start: 2025-04-18

## 2025-04-18 RX ORDER — MELOXICAM 7.5 MG/1
7.5 TABLET ORAL DAILY
Qty: 90 TABLET | Refills: 3 | Status: SHIPPED | OUTPATIENT
Start: 2025-04-18

## 2025-04-18 RX ORDER — METOPROLOL SUCCINATE 25 MG/1
50 TABLET, EXTENDED RELEASE ORAL DAILY
Qty: 180 TABLET | Refills: 3 | Status: SHIPPED | OUTPATIENT
Start: 2025-04-18

## 2025-04-18 RX ORDER — LOSARTAN POTASSIUM 100 MG/1
100 TABLET ORAL DAILY
Qty: 90 TABLET | Refills: 3 | Status: SHIPPED | OUTPATIENT
Start: 2025-04-18

## 2025-04-24 ENCOUNTER — APPOINTMENT (OUTPATIENT)
Dept: ORTHOPEDIC SURGERY | Facility: CLINIC | Age: 70
End: 2025-04-24
Payer: MEDICARE

## 2025-05-01 ENCOUNTER — APPOINTMENT (OUTPATIENT)
Dept: ORTHOPEDIC SURGERY | Facility: CLINIC | Age: 70
End: 2025-05-01
Payer: MEDICARE

## 2025-05-01 ENCOUNTER — APPOINTMENT (OUTPATIENT)
Dept: PRIMARY CARE | Facility: CLINIC | Age: 70
End: 2025-05-01
Payer: MEDICARE

## 2025-05-01 VITALS
SYSTOLIC BLOOD PRESSURE: 138 MMHG | DIASTOLIC BLOOD PRESSURE: 84 MMHG | RESPIRATION RATE: 16 BRPM | TEMPERATURE: 97.7 F | HEART RATE: 82 BPM | BODY MASS INDEX: 32.2 KG/M2 | WEIGHT: 230 LBS | HEIGHT: 71 IN

## 2025-05-01 DIAGNOSIS — I10 HTN (HYPERTENSION), BENIGN: ICD-10-CM

## 2025-05-01 DIAGNOSIS — I47.10 PAROXYSMAL SVT (SUPRAVENTRICULAR TACHYCARDIA) (CMS-HCC): ICD-10-CM

## 2025-05-01 DIAGNOSIS — M17.11 PRIMARY OSTEOARTHRITIS OF RIGHT KNEE: ICD-10-CM

## 2025-05-01 DIAGNOSIS — G47.33 OBSTRUCTIVE SLEEP APNEA: ICD-10-CM

## 2025-05-01 DIAGNOSIS — Z01.818 PREOP EXAMINATION: Primary | ICD-10-CM

## 2025-05-01 PROBLEM — R40.0 DAYTIME SOMNOLENCE: Status: RESOLVED | Noted: 2023-09-19 | Resolved: 2025-05-01

## 2025-05-01 PROBLEM — T78.40XA RASH DUE TO ALLERGY: Status: RESOLVED | Noted: 2024-07-15 | Resolved: 2025-05-01

## 2025-05-01 PROBLEM — H10.9 CONJUNCTIVITIS, BACTERIAL: Status: RESOLVED | Noted: 2023-09-19 | Resolved: 2025-05-01

## 2025-05-01 PROBLEM — R00.2 PALPITATIONS: Status: RESOLVED | Noted: 2023-09-19 | Resolved: 2025-05-01

## 2025-05-01 PROBLEM — M54.50 LUMBAGO: Status: RESOLVED | Noted: 2023-09-19 | Resolved: 2025-05-01

## 2025-05-01 PROBLEM — R07.89 ANTERIOR CHEST WALL PAIN: Status: RESOLVED | Noted: 2023-09-19 | Resolved: 2025-05-01

## 2025-05-01 PROBLEM — R94.31 ABNORMAL ECG: Status: RESOLVED | Noted: 2023-09-19 | Resolved: 2025-05-01

## 2025-05-01 PROBLEM — R21 RASH DUE TO ALLERGY: Status: RESOLVED | Noted: 2024-07-15 | Resolved: 2025-05-01

## 2025-05-01 PROCEDURE — 3075F SYST BP GE 130 - 139MM HG: CPT | Performed by: FAMILY MEDICINE

## 2025-05-01 PROCEDURE — 3008F BODY MASS INDEX DOCD: CPT | Performed by: FAMILY MEDICINE

## 2025-05-01 PROCEDURE — 99213 OFFICE O/P EST LOW 20 MIN: CPT | Performed by: FAMILY MEDICINE

## 2025-05-01 PROCEDURE — 1036F TOBACCO NON-USER: CPT | Performed by: FAMILY MEDICINE

## 2025-05-01 PROCEDURE — 1159F MED LIST DOCD IN RCRD: CPT | Performed by: FAMILY MEDICINE

## 2025-05-01 PROCEDURE — 3079F DIAST BP 80-89 MM HG: CPT | Performed by: FAMILY MEDICINE

## 2025-05-01 PROCEDURE — G2211 COMPLEX E/M VISIT ADD ON: HCPCS | Performed by: FAMILY MEDICINE

## 2025-05-01 NOTE — ASSESSMENT & PLAN NOTE
He had full evaluation by cardiology 2 years ago.  There is no sign of atrial fibrillation.  He has responded very well to a daily dose of beta-blocker which we will continue.  His rate is excellent today  Orders:    zinc acetate 50 mg (zinc) capsule; Take 1 capsule by mouth once daily.

## 2025-05-01 NOTE — PROGRESS NOTES
"Subjective   Dinh Parisi Jr. \"Ailin\" is a 69 y.o. male who presents for Pre-op Exam.     HPI         He is scheduled for right total knee arthroplasty, staple and ACL screw removal on 5/23/25 with Dr. Gerardo Arias at [facility].    Visit Vitals  /84   Pulse 82   Temp 36.5 °C (97.7 °F)   Resp 16      Objective   Physical Exam  Constitutional:       Appearance: Normal appearance.   HENT:      Head: Normocephalic.   Eyes:      Conjunctiva/sclera: Conjunctivae normal.   Cardiovascular:      Rate and Rhythm: Normal rate and regular rhythm.      Heart sounds: Normal heart sounds.   Pulmonary:      Effort: Pulmonary effort is normal.      Breath sounds: Normal breath sounds.   Musculoskeletal:      Cervical back: Neck supple.      Right lower leg: Edema present.      Left lower leg: Edema present.      Comments: 1+ pitting   Skin:     General: Skin is warm and dry.   Neurological:      Mental Status: He is alert.       No data recorded     No data recorded   No data recorded   Assessment & Plan  Preop examination  After careful history and physical examination, this patient appears to be at no increased risk for perioperative complications.  I have advised him to follow orthopedic instructions and discontinue his meloxicam at least 1 week prior to his surgery.  He may take all of his other medications up until the night before the surgery but take nothing by mouth the morning of the surgery.       Obstructive sleep apnea  This is well-managed with CPAP with symptoms controlled       Primary osteoarthritis of right knee  This is the principal reason for his to elective total knee replacement on the right.       HTN (hypertension), benign  Blood pressure is very well-controlled       Paroxysmal SVT (supraventricular tachycardia) (CMS-HCC)  He had full evaluation by cardiology 2 years ago.  There is no sign of atrial fibrillation.  He has responded very well to a daily dose of beta-blocker which we will " continue.  His rate is excellent today  Orders:    zinc acetate 50 mg (zinc) capsule; Take 1 capsule by mouth once daily.           Please excuse any errors in grammar or translation related to this dictation. Voice recognition software was utilized to prepare this document.

## 2025-05-02 DIAGNOSIS — M54.50 CHRONIC BILATERAL LOW BACK PAIN, UNSPECIFIED WHETHER SCIATICA PRESENT: ICD-10-CM

## 2025-05-02 DIAGNOSIS — E78.5 HYPERLIPIDEMIA, UNSPECIFIED HYPERLIPIDEMIA TYPE: ICD-10-CM

## 2025-05-02 DIAGNOSIS — I10 HYPERTENSION, UNSPECIFIED TYPE: ICD-10-CM

## 2025-05-02 DIAGNOSIS — G89.29 CHRONIC BILATERAL LOW BACK PAIN, UNSPECIFIED WHETHER SCIATICA PRESENT: ICD-10-CM

## 2025-05-02 DIAGNOSIS — I15.9 SECONDARY HYPERTENSION: ICD-10-CM

## 2025-05-02 RX ORDER — MELOXICAM 7.5 MG/1
7.5 TABLET ORAL DAILY
Qty: 90 TABLET | Refills: 3 | Status: SHIPPED | OUTPATIENT
Start: 2025-05-02

## 2025-05-02 RX ORDER — LOSARTAN POTASSIUM 100 MG/1
100 TABLET ORAL DAILY
Qty: 90 TABLET | Refills: 3 | Status: SHIPPED | OUTPATIENT
Start: 2025-05-02

## 2025-05-02 RX ORDER — ROSUVASTATIN CALCIUM 40 MG/1
40 TABLET, COATED ORAL DAILY
Qty: 90 TABLET | Refills: 3 | Status: SHIPPED | OUTPATIENT
Start: 2025-05-02

## 2025-05-02 RX ORDER — METOPROLOL SUCCINATE 25 MG/1
50 TABLET, EXTENDED RELEASE ORAL DAILY
Qty: 180 TABLET | Refills: 3 | Status: SHIPPED | OUTPATIENT
Start: 2025-05-02

## 2025-05-14 ENCOUNTER — OFFICE VISIT (OUTPATIENT)
Dept: ORTHOPEDIC SURGERY | Facility: CLINIC | Age: 70
End: 2025-05-14
Payer: MEDICARE

## 2025-05-14 DIAGNOSIS — Z96.651 STATUS POST TOTAL KNEE REPLACEMENT, RIGHT: Primary | ICD-10-CM

## 2025-05-14 DIAGNOSIS — M17.11 PRIMARY OSTEOARTHRITIS OF RIGHT KNEE: ICD-10-CM

## 2025-05-14 PROCEDURE — 99212 OFFICE O/P EST SF 10 MIN: CPT | Performed by: PHYSICIAN ASSISTANT

## 2025-05-14 PROCEDURE — 99024 POSTOP FOLLOW-UP VISIT: CPT | Performed by: PHYSICIAN ASSISTANT

## 2025-05-14 NOTE — PROGRESS NOTES
History and Physical      CHIEF COMPLAINT: Right knee pain and deformity    HISTORY OF PRESENT ILLNESS:      The patient is a69 y.o. male with significant past medical history of right knee pain and deformity from severe osteoarthritis.  Patient has history of old ACL reconstruction approximately 35 years ago.  He has exhausted conservative measures after risk benefits alternatives were discussed patient likes to proceed with right total knee replacement with hardware removal.      Past Medical History:  Medical History[1]     Past Surgical History:    Surgical History[2]    Medications Prior to Admission:    Medications Ordered Prior to Encounter[3]     Allergies:  Patient has no known allergies.    Social History:   Social History     Socioeconomic History    Marital status:      Spouse name: Not on file    Number of children: Not on file    Years of education: Not on file    Highest education level: Not on file   Occupational History    Not on file   Tobacco Use    Smoking status: Former     Current packs/day: 0.00     Types: Cigarettes     Quit date:      Years since quittin.3    Smokeless tobacco: Never   Vaping Use    Vaping status: Every Day   Substance and Sexual Activity    Alcohol use: Yes     Alcohol/week: 12.0 standard drinks of alcohol     Types: 12 Cans of beer per week    Drug use: Never    Sexual activity: Not on file   Other Topics Concern    Not on file   Social History Narrative    Not on file     Social Drivers of Health     Financial Resource Strain: Not on file   Food Insecurity: Not on file   Transportation Needs: Not on file   Physical Activity: Not on file   Stress: Not on file   Social Connections: Not on file   Intimate Partner Violence: Not on file   Housing Stability: Not on file       Family History:   Family History[4]     Review of systems: Noncontributory for orthopedics    Vitals:  There were no vitals taken for this visit.    Physical examination:  Head  normocephalic atraumatic  Heart regular rate and rhythm  Lungs clear  Abdominal exam nontender nondistended  Extremity: Right knee medial joint line tenderness varus deformity well-healed knee incisions from previous surgery.  Current range of motion is 2 degrees to 130 degrees    Impression : Right knee degenerative joint disease      Plan:  Scheduled for right total knee replacement with hardware removal    Risk and benefits of surgery discussed extensively with the patient.    Surgical risk included but were not limited to infection, wear, loosening, need for further surgery blood clot, failure to heal, failure of the surgery, stiffness, loss of limb life, extremity function change in length change, and associated risks of surgery during the coronavirus epidemic.    Risk with any surgery including arthroplasty and replacements include but are not limited to:       Wear, loosening, infection, blood clot, DVT, loss of limb, life, delayed recovery, limb length change, instability, dislocation, discomfort with new implant and failure of the procedure.       [1] No past medical history on file.  [2]   Past Surgical History:  Procedure Laterality Date    OTHER SURGICAL HISTORY  04/01/2021    Anterior cruciate ligament repair    SHOULDER Right     fracture repair with pin   [3]   Current Outpatient Medications on File Prior to Visit   Medication Sig Dispense Refill    ascorbic acid (CHEWABLE VITAMIN C ORAL) Take by mouth. (Patient not taking: Reported on 5/1/2025)      azelastine (Astelin) 137 mcg (0.1 %) nasal spray Administer into each nostril.      cholecalciferol (Vitamin D-3) 50 mcg (2,000 unit) capsule Take 2 capsules (100 mcg) by mouth once daily.      cyclobenzaprine (Flexeril) 10 mg tablet Take 1 tablet (10 mg) by mouth 3 times a day.      fexofenadine-pseudoephedrine (Allegra-D)  mg 12 hr tablet Take 1 tablet by mouth every 12 hours.      losartan (Cozaar) 100 mg tablet Take 1 tablet (100 mg) by mouth  once daily. 90 tablet 3    meloxicam (Mobic) 7.5 mg tablet Take 1 tablet (7.5 mg) by mouth once daily. 90 tablet 3    metoprolol succinate XL (Toprol-XL) 25 mg 24 hr tablet Take 2 tablets (50 mg) by mouth once daily. 180 tablet 3    multivitamin tablet Take 1 tablet by mouth once daily.      rosuvastatin (Crestor) 40 mg tablet Take 1 tablet (40 mg) by mouth once daily. 90 tablet 3    tadalafil 20 mg tablet Take 1 tablet (20 mg) by mouth once daily as needed for erectile dysfunction. 10 tablet 3    zinc acetate 50 mg (zinc) capsule Take 1 capsule by mouth once daily.       No current facility-administered medications on file prior to visit.   [4]   Family History  Problem Relation Name Age of Onset    Other (cardiac disorder) Father      Heart attack Father      Dementia Sister

## 2025-05-16 ENCOUNTER — HOSPITAL ENCOUNTER (OUTPATIENT)
Dept: PREADMISSION TESTING | Age: 70
Discharge: HOME | End: 2025-05-20
Payer: MEDICARE

## 2025-05-16 VITALS
RESPIRATION RATE: 17 BRPM | BODY MASS INDEX: 32.18 KG/M2 | OXYGEN SATURATION: 98 % | DIASTOLIC BLOOD PRESSURE: 81 MMHG | WEIGHT: 224.8 LBS | HEIGHT: 70 IN | SYSTOLIC BLOOD PRESSURE: 134 MMHG | HEART RATE: 73 BPM | TEMPERATURE: 97.9 F

## 2025-05-16 LAB
ABO/RH: NORMAL
ANTIBODY SCREEN: NORMAL
APTT PPP: 32.5 SEC (ref 24.4–36.8)
BASOPHILS # BLD: 0.1 K/UL (ref 0–0.2)
BASOPHILS NFR BLD: 1.4 %
BILIRUB UR QL STRIP: NEGATIVE
CLARITY UR: CLEAR
COLOR UR: YELLOW
EOSINOPHIL # BLD: 0.5 K/UL (ref 0–0.7)
EOSINOPHIL NFR BLD: 7 %
ERYTHROCYTE [DISTWIDTH] IN BLOOD BY AUTOMATED COUNT: 12.6 % (ref 11.5–14.5)
GLUCOSE UR STRIP-MCNC: NEGATIVE MG/DL
HCT VFR BLD AUTO: 42.1 % (ref 42–52)
HGB BLD-MCNC: 14.3 G/DL (ref 14–18)
HGB UR QL STRIP: NEGATIVE
INR PPP: 1
KETONES UR STRIP-MCNC: NEGATIVE MG/DL
LEUKOCYTE ESTERASE UR QL STRIP: NEGATIVE
LYMPHOCYTES # BLD: 2.1 K/UL (ref 1–4.8)
LYMPHOCYTES NFR BLD: 29.2 %
MCH RBC QN AUTO: 31.2 PG (ref 27–31.3)
MCHC RBC AUTO-ENTMCNC: 34 % (ref 33–37)
MCV RBC AUTO: 91.9 FL (ref 79–92.2)
MONOCYTES # BLD: 1 K/UL (ref 0.2–0.8)
MONOCYTES NFR BLD: 14 %
NEUTROPHILS # BLD: 3.4 K/UL (ref 1.4–6.5)
NEUTS SEG NFR BLD: 47.3 %
NITRITE UR QL STRIP: NEGATIVE
PH UR STRIP: 7 [PH] (ref 5–9)
PLATELET # BLD AUTO: 241 K/UL (ref 130–400)
PROT UR STRIP-MCNC: NEGATIVE MG/DL
PROTHROMBIN TIME: 13.2 SEC (ref 12.3–14.9)
RBC # BLD AUTO: 4.58 M/UL (ref 4.7–6.1)
SP GR UR STRIP: 1.01 (ref 1–1.03)
URINE REFLEX TO CULTURE: NORMAL
UROBILINOGEN UR STRIP-ACNC: 0.2 E.U./DL
WBC # BLD AUTO: 7.2 K/UL (ref 4.8–10.8)

## 2025-05-16 PROCEDURE — 81003 URINALYSIS AUTO W/O SCOPE: CPT

## 2025-05-16 PROCEDURE — 85025 COMPLETE CBC W/AUTO DIFF WBC: CPT

## 2025-05-16 PROCEDURE — 85730 THROMBOPLASTIN TIME PARTIAL: CPT

## 2025-05-16 PROCEDURE — 86901 BLOOD TYPING SEROLOGIC RH(D): CPT

## 2025-05-16 PROCEDURE — 85610 PROTHROMBIN TIME: CPT

## 2025-05-16 PROCEDURE — 86850 RBC ANTIBODY SCREEN: CPT

## 2025-05-16 PROCEDURE — 93005 ELECTROCARDIOGRAM TRACING: CPT | Performed by: ORTHOPAEDIC SURGERY

## 2025-05-16 PROCEDURE — 86900 BLOOD TYPING SEROLOGIC ABO: CPT

## 2025-05-16 PROCEDURE — 87641 MR-STAPH DNA AMP PROBE: CPT

## 2025-05-16 RX ORDER — MELOXICAM 7.5 MG/1
7.5 TABLET ORAL DAILY
Status: ON HOLD | COMMUNITY
Start: 2025-05-02

## 2025-05-16 RX ORDER — FEXOFENADINE HCL AND PSEUDOEPHEDRINE HCI 60; 120 MG/1; MG/1
1 TABLET, EXTENDED RELEASE ORAL EVERY 12 HOURS
Status: ON HOLD | COMMUNITY

## 2025-05-16 RX ORDER — METOPROLOL SUCCINATE 25 MG/1
50 TABLET, EXTENDED RELEASE ORAL DAILY
Status: ON HOLD | COMMUNITY
Start: 2025-05-02

## 2025-05-16 RX ORDER — ROSUVASTATIN CALCIUM 40 MG/1
40 TABLET, COATED ORAL DAILY
Status: ON HOLD | COMMUNITY
Start: 2025-05-02

## 2025-05-16 RX ORDER — LOSARTAN POTASSIUM 100 MG/1
100 TABLET ORAL DAILY
Status: ON HOLD | COMMUNITY
Start: 2025-05-02

## 2025-05-16 RX ORDER — MULTIVITAMIN
1 TABLET ORAL DAILY
Status: ON HOLD | COMMUNITY

## 2025-05-16 RX ORDER — AZELASTINE 1 MG/ML
SPRAY, METERED NASAL
Status: ON HOLD | COMMUNITY

## 2025-05-16 RX ORDER — CYCLOBENZAPRINE HCL 10 MG
10 TABLET ORAL 3 TIMES DAILY
Status: ON HOLD | COMMUNITY

## 2025-05-17 LAB
MRSA, DNA, NASAL: NEGATIVE
SPECIMEN DESCRIPTION: NORMAL

## 2025-05-19 LAB
EKG ATRIAL RATE: 65 BPM
EKG DIAGNOSIS: NORMAL
EKG P AXIS: 15 DEGREES
EKG P-R INTERVAL: 164 MS
EKG Q-T INTERVAL: 396 MS
EKG QRS DURATION: 116 MS
EKG QTC CALCULATION (BAZETT): 411 MS
EKG R AXIS: -51 DEGREES
EKG T AXIS: 45 DEGREES
EKG VENTRICULAR RATE: 65 BPM

## 2025-05-23 ENCOUNTER — HOSPITAL ENCOUNTER (INPATIENT)
Age: 70
LOS: 1 days | Discharge: NURSING, REHAB OR LONG-TERM CARE | DRG: 982 | End: 2025-05-26
Attending: ORTHOPAEDIC SURGERY | Admitting: ORTHOPAEDIC SURGERY
Payer: MEDICARE

## 2025-05-23 ENCOUNTER — APPOINTMENT (OUTPATIENT)
Dept: GENERAL RADIOLOGY | Age: 70
DRG: 982 | End: 2025-05-23
Attending: ORTHOPAEDIC SURGERY
Payer: MEDICARE

## 2025-05-23 ENCOUNTER — ANESTHESIA EVENT (OUTPATIENT)
Dept: OPERATING ROOM | Age: 70
End: 2025-05-23
Payer: MEDICARE

## 2025-05-23 ENCOUNTER — ANESTHESIA (OUTPATIENT)
Dept: OPERATING ROOM | Age: 70
End: 2025-05-23
Payer: MEDICARE

## 2025-05-23 DIAGNOSIS — I47.10 PAROXYSMAL SVT (SUPRAVENTRICULAR TACHYCARDIA): Primary | ICD-10-CM

## 2025-05-23 PROBLEM — Z96.651 STATUS POST TOTAL KNEE REPLACEMENT, RIGHT: Status: ACTIVE | Noted: 2025-05-23

## 2025-05-23 PROCEDURE — 2580000003 HC RX 258: Performed by: STUDENT IN AN ORGANIZED HEALTH CARE EDUCATION/TRAINING PROGRAM

## 2025-05-23 PROCEDURE — 2720000010 HC SURG SUPPLY STERILE: Performed by: ORTHOPAEDIC SURGERY

## 2025-05-23 PROCEDURE — 6360000002 HC RX W HCPCS: Performed by: NURSE PRACTITIONER

## 2025-05-23 PROCEDURE — 2580000003 HC RX 258: Performed by: ANESTHESIOLOGIST ASSISTANT

## 2025-05-23 PROCEDURE — 6360000002 HC RX W HCPCS: Performed by: ANESTHESIOLOGIST ASSISTANT

## 2025-05-23 PROCEDURE — 2500000003 HC RX 250 WO HCPCS: Performed by: NURSE PRACTITIONER

## 2025-05-23 PROCEDURE — 6370000000 HC RX 637 (ALT 250 FOR IP): Performed by: STUDENT IN AN ORGANIZED HEALTH CARE EDUCATION/TRAINING PROGRAM

## 2025-05-23 PROCEDURE — 3600000013 HC SURGERY LEVEL 3 ADDTL 15MIN: Performed by: ORTHOPAEDIC SURGERY

## 2025-05-23 PROCEDURE — 6360000002 HC RX W HCPCS: Performed by: ORTHOPAEDIC SURGERY

## 2025-05-23 PROCEDURE — 2580000003 HC RX 258: Performed by: NURSE PRACTITIONER

## 2025-05-23 PROCEDURE — 27447 TOTAL KNEE ARTHROPLASTY: CPT | Performed by: ORTHOPAEDIC SURGERY

## 2025-05-23 PROCEDURE — 94640 AIRWAY INHALATION TREATMENT: CPT

## 2025-05-23 PROCEDURE — 2500000003 HC RX 250 WO HCPCS: Performed by: ORTHOPAEDIC SURGERY

## 2025-05-23 PROCEDURE — 3600000003 HC SURGERY LEVEL 3 BASE: Performed by: ORTHOPAEDIC SURGERY

## 2025-05-23 PROCEDURE — 7100000001 HC PACU RECOVERY - ADDTL 15 MIN: Performed by: ORTHOPAEDIC SURGERY

## 2025-05-23 PROCEDURE — 94150 VITAL CAPACITY TEST: CPT

## 2025-05-23 PROCEDURE — 6370000000 HC RX 637 (ALT 250 FOR IP): Performed by: NURSE PRACTITIONER

## 2025-05-23 PROCEDURE — 0SRC0J9 REPLACEMENT OF RIGHT KNEE JOINT WITH SYNTHETIC SUBSTITUTE, CEMENTED, OPEN APPROACH: ICD-10-PCS | Performed by: ORTHOPAEDIC SURGERY

## 2025-05-23 PROCEDURE — 0YP90JZ REMOVAL OF SYNTHETIC SUBSTITUTE FROM RIGHT LOWER EXTREMITY, OPEN APPROACH: ICD-10-PCS | Performed by: ORTHOPAEDIC SURGERY

## 2025-05-23 PROCEDURE — 64447 NJX AA&/STRD FEMORAL NRV IMG: CPT | Performed by: STUDENT IN AN ORGANIZED HEALTH CARE EDUCATION/TRAINING PROGRAM

## 2025-05-23 PROCEDURE — G0378 HOSPITAL OBSERVATION PER HR: HCPCS

## 2025-05-23 PROCEDURE — 2709999900 HC NON-CHARGEABLE SUPPLY: Performed by: ORTHOPAEDIC SURGERY

## 2025-05-23 PROCEDURE — 6360000002 HC RX W HCPCS: Performed by: STUDENT IN AN ORGANIZED HEALTH CARE EDUCATION/TRAINING PROGRAM

## 2025-05-23 PROCEDURE — 3700000001 HC ADD 15 MINUTES (ANESTHESIA): Performed by: ORTHOPAEDIC SURGERY

## 2025-05-23 PROCEDURE — 73560 X-RAY EXAM OF KNEE 1 OR 2: CPT

## 2025-05-23 PROCEDURE — 27447 TOTAL KNEE ARTHROPLASTY: CPT | Performed by: PHYSICIAN ASSISTANT

## 2025-05-23 PROCEDURE — 7100000000 HC PACU RECOVERY - FIRST 15 MIN: Performed by: ORTHOPAEDIC SURGERY

## 2025-05-23 PROCEDURE — C1776 JOINT DEVICE (IMPLANTABLE): HCPCS | Performed by: ORTHOPAEDIC SURGERY

## 2025-05-23 PROCEDURE — C1713 ANCHOR/SCREW BN/BN,TIS/BN: HCPCS | Performed by: ORTHOPAEDIC SURGERY

## 2025-05-23 PROCEDURE — 3700000000 HC ANESTHESIA ATTENDED CARE: Performed by: ORTHOPAEDIC SURGERY

## 2025-05-23 DEVICE — IMPLANTABLE DEVICE
Type: IMPLANTABLE DEVICE | Site: KNEE | Status: FUNCTIONAL
Brand: PERSONA®

## 2025-05-23 DEVICE — IMPLANTABLE DEVICE
Type: IMPLANTABLE DEVICE | Site: KNEE | Status: FUNCTIONAL
Brand: PERSONA® NATURAL TIBIA®

## 2025-05-23 DEVICE — COMPONENT ARTC SURF PS 6-9 EF 11 MM RT TIB KNEE POLYETH: Type: IMPLANTABLE DEVICE | Site: KNEE | Status: FUNCTIONAL

## 2025-05-23 DEVICE — IMPLANTABLE DEVICE
Type: IMPLANTABLE DEVICE | Site: KNEE | Status: FUNCTIONAL
Brand: BIOMET® BONE CEMENT R

## 2025-05-23 RX ORDER — IPRATROPIUM BROMIDE AND ALBUTEROL SULFATE 2.5; .5 MG/3ML; MG/3ML
1 SOLUTION RESPIRATORY (INHALATION) ONCE
Status: COMPLETED | OUTPATIENT
Start: 2025-05-23 | End: 2025-05-23

## 2025-05-23 RX ORDER — SODIUM CHLORIDE 0.9 % (FLUSH) 0.9 %
5-40 SYRINGE (ML) INJECTION EVERY 12 HOURS SCHEDULED
Status: DISCONTINUED | OUTPATIENT
Start: 2025-05-23 | End: 2025-05-26 | Stop reason: HOSPADM

## 2025-05-23 RX ORDER — SENNA AND DOCUSATE SODIUM 50; 8.6 MG/1; MG/1
1 TABLET, FILM COATED ORAL 2 TIMES DAILY
Status: DISCONTINUED | OUTPATIENT
Start: 2025-05-23 | End: 2025-05-26 | Stop reason: HOSPADM

## 2025-05-23 RX ORDER — METOCLOPRAMIDE HYDROCHLORIDE 5 MG/ML
10 INJECTION INTRAMUSCULAR; INTRAVENOUS
Status: DISCONTINUED | OUTPATIENT
Start: 2025-05-23 | End: 2025-05-23 | Stop reason: HOSPADM

## 2025-05-23 RX ORDER — ONDANSETRON 2 MG/ML
4 INJECTION INTRAMUSCULAR; INTRAVENOUS EVERY 6 HOURS PRN
Status: DISCONTINUED | OUTPATIENT
Start: 2025-05-23 | End: 2025-05-26 | Stop reason: HOSPADM

## 2025-05-23 RX ORDER — MAGNESIUM HYDROXIDE/ALUMINUM HYDROXICE/SIMETHICONE 120; 1200; 1200 MG/30ML; MG/30ML; MG/30ML
15 SUSPENSION ORAL EVERY 6 HOURS PRN
Status: DISCONTINUED | OUTPATIENT
Start: 2025-05-23 | End: 2025-05-26 | Stop reason: HOSPADM

## 2025-05-23 RX ORDER — LIDOCAINE HYDROCHLORIDE 10 MG/ML
1 INJECTION, SOLUTION EPIDURAL; INFILTRATION; INTRACAUDAL; PERINEURAL
Status: DISCONTINUED | OUTPATIENT
Start: 2025-05-23 | End: 2025-05-23 | Stop reason: HOSPADM

## 2025-05-23 RX ORDER — PROPOFOL 10 MG/ML
INJECTION, EMULSION INTRAVENOUS
Status: DISCONTINUED | OUTPATIENT
Start: 2025-05-23 | End: 2025-05-23 | Stop reason: SDUPTHER

## 2025-05-23 RX ORDER — LIDOCAINE HYDROCHLORIDE 20 MG/ML
INJECTION, SOLUTION EPIDURAL; INFILTRATION; INTRACAUDAL; PERINEURAL
Status: DISCONTINUED | OUTPATIENT
Start: 2025-05-23 | End: 2025-05-23 | Stop reason: SDUPTHER

## 2025-05-23 RX ORDER — SODIUM CHLORIDE 9 MG/ML
INJECTION, SOLUTION INTRAVENOUS PRN
Status: DISCONTINUED | OUTPATIENT
Start: 2025-05-23 | End: 2025-05-23 | Stop reason: HOSPADM

## 2025-05-23 RX ORDER — CYCLOBENZAPRINE HCL 10 MG
10 TABLET ORAL 3 TIMES DAILY PRN
Status: DISCONTINUED | OUTPATIENT
Start: 2025-05-23 | End: 2025-05-26 | Stop reason: HOSPADM

## 2025-05-23 RX ORDER — BUPIVACAINE HYDROCHLORIDE 5 MG/ML
INJECTION, SOLUTION EPIDURAL; INTRACAUDAL; PERINEURAL
Status: COMPLETED | OUTPATIENT
Start: 2025-05-23 | End: 2025-05-23

## 2025-05-23 RX ORDER — SODIUM CHLORIDE 0.9 % (FLUSH) 0.9 %
5-40 SYRINGE (ML) INJECTION EVERY 12 HOURS SCHEDULED
Status: DISCONTINUED | OUTPATIENT
Start: 2025-05-23 | End: 2025-05-23 | Stop reason: HOSPADM

## 2025-05-23 RX ORDER — ONDANSETRON 2 MG/ML
4 INJECTION INTRAMUSCULAR; INTRAVENOUS
Status: DISCONTINUED | OUTPATIENT
Start: 2025-05-23 | End: 2025-05-23 | Stop reason: HOSPADM

## 2025-05-23 RX ORDER — SODIUM CHLORIDE 9 MG/ML
INJECTION, SOLUTION INTRAVENOUS CONTINUOUS
Status: DISCONTINUED | OUTPATIENT
Start: 2025-05-23 | End: 2025-05-23 | Stop reason: HOSPADM

## 2025-05-23 RX ORDER — MEPERIDINE HYDROCHLORIDE 25 MG/ML
12.5 INJECTION INTRAMUSCULAR; INTRAVENOUS; SUBCUTANEOUS
Status: DISCONTINUED | OUTPATIENT
Start: 2025-05-23 | End: 2025-05-23 | Stop reason: HOSPADM

## 2025-05-23 RX ORDER — ROSUVASTATIN CALCIUM 40 MG/1
40 TABLET, COATED ORAL NIGHTLY
Status: DISCONTINUED | OUTPATIENT
Start: 2025-05-24 | End: 2025-05-26 | Stop reason: HOSPADM

## 2025-05-23 RX ORDER — FENTANYL CITRATE 50 UG/ML
INJECTION, SOLUTION INTRAMUSCULAR; INTRAVENOUS
Status: DISCONTINUED | OUTPATIENT
Start: 2025-05-23 | End: 2025-05-23 | Stop reason: SDUPTHER

## 2025-05-23 RX ORDER — SODIUM CHLORIDE 0.9 % (FLUSH) 0.9 %
5-40 SYRINGE (ML) INJECTION PRN
Status: DISCONTINUED | OUTPATIENT
Start: 2025-05-23 | End: 2025-05-23 | Stop reason: HOSPADM

## 2025-05-23 RX ORDER — SODIUM CHLORIDE 9 MG/ML
INJECTION, SOLUTION INTRAVENOUS CONTINUOUS
Status: DISCONTINUED | OUTPATIENT
Start: 2025-05-23 | End: 2025-05-26 | Stop reason: HOSPADM

## 2025-05-23 RX ORDER — DIPHENHYDRAMINE HYDROCHLORIDE 50 MG/ML
12.5 INJECTION, SOLUTION INTRAMUSCULAR; INTRAVENOUS
Status: DISCONTINUED | OUTPATIENT
Start: 2025-05-23 | End: 2025-05-23 | Stop reason: HOSPADM

## 2025-05-23 RX ORDER — LOSARTAN POTASSIUM 100 MG/1
100 TABLET ORAL DAILY
Status: DISCONTINUED | OUTPATIENT
Start: 2025-05-24 | End: 2025-05-26 | Stop reason: HOSPADM

## 2025-05-23 RX ORDER — OXYCODONE HYDROCHLORIDE 5 MG/1
5 TABLET ORAL EVERY 4 HOURS PRN
Status: DISCONTINUED | OUTPATIENT
Start: 2025-05-23 | End: 2025-05-26 | Stop reason: HOSPADM

## 2025-05-23 RX ORDER — KETOROLAC TROMETHAMINE 15 MG/ML
7.5 INJECTION, SOLUTION INTRAMUSCULAR; INTRAVENOUS EVERY 6 HOURS
Status: COMPLETED | OUTPATIENT
Start: 2025-05-23 | End: 2025-05-24

## 2025-05-23 RX ORDER — SODIUM CHLORIDE 9 MG/ML
INJECTION, SOLUTION INTRAVENOUS PRN
Status: DISCONTINUED | OUTPATIENT
Start: 2025-05-23 | End: 2025-05-26 | Stop reason: HOSPADM

## 2025-05-23 RX ORDER — SODIUM CHLORIDE 0.9 % (FLUSH) 0.9 %
5-40 SYRINGE (ML) INJECTION PRN
Status: DISCONTINUED | OUTPATIENT
Start: 2025-05-23 | End: 2025-05-26 | Stop reason: HOSPADM

## 2025-05-23 RX ORDER — METOPROLOL SUCCINATE 50 MG/1
50 TABLET, EXTENDED RELEASE ORAL DAILY
Status: DISCONTINUED | OUTPATIENT
Start: 2025-05-24 | End: 2025-05-26 | Stop reason: HOSPADM

## 2025-05-23 RX ORDER — CELECOXIB 200 MG/1
200 CAPSULE ORAL ONCE
Status: COMPLETED | OUTPATIENT
Start: 2025-05-23 | End: 2025-05-23

## 2025-05-23 RX ORDER — ACETAMINOPHEN 500 MG
1000 TABLET ORAL ONCE
Status: COMPLETED | OUTPATIENT
Start: 2025-05-23 | End: 2025-05-23

## 2025-05-23 RX ORDER — ASPIRIN 81 MG/1
81 TABLET ORAL 2 TIMES DAILY
Status: DISCONTINUED | OUTPATIENT
Start: 2025-05-23 | End: 2025-05-26 | Stop reason: HOSPADM

## 2025-05-23 RX ORDER — TRANEXAMIC ACID 650 MG/1
1950 TABLET ORAL EVERY 8 HOURS
Status: COMPLETED | OUTPATIENT
Start: 2025-05-23 | End: 2025-05-23

## 2025-05-23 RX ORDER — OXYCODONE HYDROCHLORIDE 5 MG/1
2.5 TABLET ORAL EVERY 4 HOURS PRN
Status: DISCONTINUED | OUTPATIENT
Start: 2025-05-23 | End: 2025-05-26 | Stop reason: HOSPADM

## 2025-05-23 RX ORDER — OXYCODONE HCL 10 MG/1
10 TABLET, FILM COATED, EXTENDED RELEASE ORAL ONCE
Status: COMPLETED | OUTPATIENT
Start: 2025-05-23 | End: 2025-05-23

## 2025-05-23 RX ORDER — FENTANYL CITRATE 0.05 MG/ML
50 INJECTION, SOLUTION INTRAMUSCULAR; INTRAVENOUS EVERY 10 MIN PRN
Status: DISCONTINUED | OUTPATIENT
Start: 2025-05-23 | End: 2025-05-23 | Stop reason: HOSPADM

## 2025-05-23 RX ORDER — TRANEXAMIC ACID 650 MG/1
1950 TABLET ORAL ONCE
Status: COMPLETED | OUTPATIENT
Start: 2025-05-24 | End: 2025-05-24

## 2025-05-23 RX ORDER — ROPIVACAINE HYDROCHLORIDE 5 MG/ML
INJECTION, SOLUTION EPIDURAL; INFILTRATION; PERINEURAL
Status: COMPLETED | OUTPATIENT
Start: 2025-05-23 | End: 2025-05-23

## 2025-05-23 RX ORDER — NALOXONE HYDROCHLORIDE 0.4 MG/ML
INJECTION, SOLUTION INTRAMUSCULAR; INTRAVENOUS; SUBCUTANEOUS PRN
Status: DISCONTINUED | OUTPATIENT
Start: 2025-05-23 | End: 2025-05-23 | Stop reason: HOSPADM

## 2025-05-23 RX ORDER — TRANEXAMIC ACID 650 MG/1
1950 TABLET ORAL
Status: DISCONTINUED | OUTPATIENT
Start: 2025-05-23 | End: 2025-05-23 | Stop reason: HOSPADM

## 2025-05-23 RX ORDER — HYDROXYZINE HYDROCHLORIDE 10 MG/1
10 TABLET, FILM COATED ORAL 3 TIMES DAILY
Status: DISCONTINUED | OUTPATIENT
Start: 2025-05-23 | End: 2025-05-26 | Stop reason: HOSPADM

## 2025-05-23 RX ORDER — OXYCODONE HYDROCHLORIDE 5 MG/1
5 TABLET ORAL
Status: DISCONTINUED | OUTPATIENT
Start: 2025-05-23 | End: 2025-05-23 | Stop reason: HOSPADM

## 2025-05-23 RX ORDER — SODIUM CHLORIDE 9 MG/ML
INJECTION, SOLUTION INTRAVENOUS
Status: DISCONTINUED | OUTPATIENT
Start: 2025-05-23 | End: 2025-05-23 | Stop reason: SDUPTHER

## 2025-05-23 RX ORDER — ONDANSETRON 4 MG/1
4 TABLET, ORALLY DISINTEGRATING ORAL EVERY 8 HOURS PRN
Status: DISCONTINUED | OUTPATIENT
Start: 2025-05-23 | End: 2025-05-26 | Stop reason: HOSPADM

## 2025-05-23 RX ORDER — MIDAZOLAM HYDROCHLORIDE 1 MG/ML
INJECTION, SOLUTION INTRAMUSCULAR; INTRAVENOUS
Status: COMPLETED | OUTPATIENT
Start: 2025-05-23 | End: 2025-05-23

## 2025-05-23 RX ORDER — ACETAMINOPHEN 325 MG/1
650 TABLET ORAL EVERY 6 HOURS
Status: DISCONTINUED | OUTPATIENT
Start: 2025-05-23 | End: 2025-05-26 | Stop reason: HOSPADM

## 2025-05-23 RX ADMIN — OXYCODONE HYDROCHLORIDE 10 MG: 10 TABLET, FILM COATED, EXTENDED RELEASE ORAL at 10:55

## 2025-05-23 RX ADMIN — ACETAMINOPHEN 650 MG: 325 TABLET ORAL at 22:50

## 2025-05-23 RX ADMIN — MIDAZOLAM HYDROCHLORIDE 1 MG: 1 INJECTION, SOLUTION INTRAMUSCULAR; INTRAVENOUS at 13:19

## 2025-05-23 RX ADMIN — FENTANYL CITRATE 50 MCG: 50 INJECTION, SOLUTION INTRAMUSCULAR; INTRAVENOUS at 13:12

## 2025-05-23 RX ADMIN — CEFAZOLIN 2000 MG: 2 INJECTION, POWDER, FOR SOLUTION INTRAMUSCULAR; INTRAVENOUS at 12:40

## 2025-05-23 RX ADMIN — SODIUM CHLORIDE: 0.9 INJECTION, SOLUTION INTRAVENOUS at 22:59

## 2025-05-23 RX ADMIN — SODIUM CHLORIDE: 0.9 INJECTION, SOLUTION INTRAVENOUS at 12:27

## 2025-05-23 RX ADMIN — ROPIVACAINE HYDROCHLORIDE 30 ML: 5 INJECTION, SOLUTION EPIDURAL; INFILTRATION; PERINEURAL at 12:08

## 2025-05-23 RX ADMIN — BUPIVACAINE HYDROCHLORIDE 10 MG: 5 INJECTION, SOLUTION EPIDURAL; INTRACAUDAL; PERINEURAL at 12:08

## 2025-05-23 RX ADMIN — KETOROLAC TROMETHAMINE 7.5 MG: 15 INJECTION, SOLUTION INTRAMUSCULAR; INTRAVENOUS at 22:50

## 2025-05-23 RX ADMIN — SODIUM CHLORIDE: 0.9 INJECTION, SOLUTION INTRAVENOUS at 11:16

## 2025-05-23 RX ADMIN — KETOROLAC TROMETHAMINE 7.5 MG: 15 INJECTION, SOLUTION INTRAMUSCULAR; INTRAVENOUS at 16:50

## 2025-05-23 RX ADMIN — PROPOFOL 50 MCG/KG/MIN: 10 INJECTION, EMULSION INTRAVENOUS at 12:30

## 2025-05-23 RX ADMIN — ACETAMINOPHEN 1000 MG: 500 TABLET ORAL at 10:55

## 2025-05-23 RX ADMIN — TRANEXAMIC ACID 1950 MG: 650 TABLET ORAL at 21:24

## 2025-05-23 RX ADMIN — ACETAMINOPHEN 650 MG: 325 TABLET ORAL at 16:50

## 2025-05-23 RX ADMIN — CELECOXIB 200 MG: 200 CAPSULE ORAL at 10:55

## 2025-05-23 RX ADMIN — WATER 2000 MG: 1 INJECTION INTRAMUSCULAR; INTRAVENOUS; SUBCUTANEOUS at 21:24

## 2025-05-23 RX ADMIN — TRANEXAMIC ACID 1950 MG: 650 TABLET ORAL at 10:55

## 2025-05-23 RX ADMIN — LIDOCAINE HYDROCHLORIDE 40 MG: 20 INJECTION, SOLUTION EPIDURAL; INFILTRATION; INTRACAUDAL; PERINEURAL at 12:30

## 2025-05-23 RX ADMIN — IPRATROPIUM BROMIDE AND ALBUTEROL SULFATE 1 DOSE: .5; 2.5 SOLUTION RESPIRATORY (INHALATION) at 15:10

## 2025-05-23 RX ADMIN — Medication 10 ML: at 21:25

## 2025-05-23 RX ADMIN — HYDROXYZINE HYDROCHLORIDE 10 MG: 10 TABLET ORAL at 16:50

## 2025-05-23 RX ADMIN — OXYCODONE 5 MG: 5 TABLET ORAL at 18:27

## 2025-05-23 RX ADMIN — SENNOSIDES AND DOCUSATE SODIUM 1 TABLET: 50; 8.6 TABLET ORAL at 21:26

## 2025-05-23 RX ADMIN — HYDROXYZINE HYDROCHLORIDE 10 MG: 10 TABLET ORAL at 21:25

## 2025-05-23 RX ADMIN — MIDAZOLAM HYDROCHLORIDE 1 MG: 1 INJECTION, SOLUTION INTRAMUSCULAR; INTRAVENOUS at 13:12

## 2025-05-23 RX ADMIN — ASPIRIN 81 MG: 81 TABLET, COATED ORAL at 21:25

## 2025-05-23 RX ADMIN — MIDAZOLAM HYDROCHLORIDE 2 MG: 1 INJECTION, SOLUTION INTRAMUSCULAR; INTRAVENOUS at 12:08

## 2025-05-23 ASSESSMENT — PAIN SCALES - GENERAL
PAINLEVEL_OUTOF10: 0
PAINLEVEL_OUTOF10: 4
PAINLEVEL_OUTOF10: 3
PAINLEVEL_OUTOF10: 3
PAINLEVEL_OUTOF10: 0
PAINLEVEL_OUTOF10: 0

## 2025-05-23 ASSESSMENT — PAIN DESCRIPTION - DESCRIPTORS: DESCRIPTORS: ACHING

## 2025-05-23 ASSESSMENT — PAIN DESCRIPTION - LOCATION: LOCATION: KNEE

## 2025-05-23 ASSESSMENT — PAIN DESCRIPTION - ORIENTATION: ORIENTATION: RIGHT

## 2025-05-23 NOTE — ANESTHESIA PROCEDURE NOTES
Spinal Block    Patient location during procedure: OR  End time: 5/23/2025 12:18 PM  Reason for block: primary anesthetic  Staffing  Performed: anesthesiologist   Anesthesiologist: Jarrod Ortiz DO  Performed by: Jarrod Ortiz DO  Authorized by: Jarrod Ortiz DO    Spinal Block  Patient position: sitting  Prep: Betadine  Patient monitoring: cardiac monitor, continuous pulse ox and frequent blood pressure checks  Approach: midline  Location: L4/L5  Provider prep: mask and sterile gloves  Local infiltration: lidocaine  Needle  Needle type: Pencan   Needle gauge: 22 G  Needle length: 3.5 in  Assessment  Sensory level: T6  Events: cerebrospinal fluid  Lysis level: CSF aspirated.  CSF: clear  Attempts: 1  Hemodynamics: stable  Additional Notes  Free flowing CSF.  Negative heme.  Negative paresthesia.  .  Preanesthetic Checklist  Completed: patient identified, IV checked, site marked, risks and benefits discussed, surgical/procedural consents, equipment checked, pre-op evaluation, timeout performed, anesthesia consent given, oxygen available and monitors applied/VS acknowledged

## 2025-05-23 NOTE — CARE COORDINATION
Case Management Assessment  Initial Evaluation    Date/Time of Evaluation: 5/23/2025 4:21 PM  Assessment Completed by: Valery Blanca    If patient is discharged prior to next notation, then this note serves as note for discharge by case management.    Patient Name: Allen Zambrano                   YOB: 1955  Diagnosis: Osteoarthritis of right knee, unspecified osteoarthritis type [M17.11]  Status post total knee replacement, right [Z96.651]                   Date / Time: 5/23/2025  9:48 AM    Patient Admission Status: Observation   Readmission Risk (Low < 19, Mod (19-27), High > 27): No data recorded  Current PCP: Eugene Youssef MD  PCP verified by CM? Yes    Chart Reviewed: Yes      History Provided by: Patient  Patient Orientation: Alert and Oriented, Person, Place, Situation, Self    Patient Cognition: Alert    Hospitalization in the last 30 days (Readmission):  No    If yes, Readmission Assessment in CM Navigator will be completed.    Advance Directives:      Code Status: Full Code   Patient's Primary Decision Maker is: Legal Next of Kin      Discharge Planning:    Patient lives with:   Type of Home:    Primary Care Giver: Self  Patient Support Systems include: Spouse/Significant Other, Children   Current Financial resources:    Current community resources:    Current services prior to admission:              Current DME:              Type of Home Care services:       ADLS  Prior functional level: Independent in ADLs/IADLs  Current functional level: Other (see comment) (See PT/OT eval)    PT AM-PAC:   /24  OT AM-PAC:   /24    Family can provide assistance at DC: Yes  Would you like Case Management to discuss the discharge plan with any other family members/significant others, and if so, who? Yes (Spouse or dtr)  Plans to Return to Present Housing: Yes  Other Identified Issues/Barriers to RETURNING to current housing: Pt requesting [facility]   Potential Assistance needed at discharge:

## 2025-05-23 NOTE — OP NOTE
Operative Note      Patient: Allen Zambrano  YOB: 1955  MRN: [id number]    Date of Procedure: 5/23/2025    Pre-Op Diagnosis Codes:      * Osteoarthritis of right knee, unspecified osteoarthritis type [M17.11]    Post-Op Diagnosis:  Osteoarthritis right knee and painful hardware right knee       Procedure:    Right total knee replacement 54789    Removal of deep implant and hardware 44438    Surgeon(s):  Sam Hutchison MD    Assistant:   Physician Assistant: Sam Pinon PA-C Robert Harasty, PA-C was required and present throughout the entire case.    Given the nature of the disease process and the procedure, a skilled surgical first assistant was necessary during entire case.  The assistant was necessary to hold retractors and manipulate the extremity during the procedure.  A certified scrub tech was also present at the back table managing instruments with supplies for the surgical case.    Anesthesia: Spinal    Estimated Blood Loss (mL): Minimal    Complications: None    Specimens:   * No specimens in log *    Implants:  Implant Name Type Inv. Item Serial No.  Lot No. LRB No. Used Action   CEMENT BNE 40GM HI VISC RADPQ FOR REV SURG - [id number]  CEMENT BNE 40GM HI VISC RADPQ FOR REV SURG  [employer]- [id number] Right 1 Implanted   CEMENT BNE 40GM HI VISC RADPQ FOR REV SURG - [id number]  CEMENT BNE 40GM HI VISC RADPQ FOR REV SURG  [employer]- [id number] Right 1 Implanted   COMPONENT ARTC SURF PS 6-9 EF 11 MM RT TIB KNEE POLYETH - [id number]  COMPONENT ARTC SURF PS 6-9 EF 11 MM RT TIB KNEE POLYETH  [employer]- [id number] Right 1 Implanted   COMPONENT FEM SZ 8 STD R KNEE CO CHROM POST STBL JIE - [id number]  COMPONENT FEM SZ 8 STD R KNEE CO CHROM POST STBL JIE  [employer]- [id number] Right 1 Implanted   EXTENSION STEM L30MM [id number] KNEE TAPR JIE PERSONA - [id number]  EXTENSION STEM L30MM [id number] KNEE TAPR JIE

## 2025-05-23 NOTE — ANESTHESIA PRE PROCEDURE
Department of Anesthesiology  Preprocedure Note       Name:  Allen Zambrano   Age:  69 y.o.  :  1955                                          MRN:  [id number]         Date:  2025      Surgeon: Surgeon(s):  Sam Hutchison MD    Procedure: Procedure(s):  RIGHT KNEE TOTAL KNEE  ARTHROPLASTY. REMOVAL STAPLE AND ACL SCREW. SUPINE, MELINA, SYNTHES SMALL & LARGE FRAG SCREW  SET. MONICA AND LETTY ARE AWARE    Medications prior to admission:   Prior to Admission medications    Medication Sig Start Date End Date Taking? Authorizing Provider   metoprolol succinate (TOPROL XL) 25 MG extended release tablet Take 2 tablets by mouth daily 25  Yes ProviderPhillip MD   rosuvastatin (CRESTOR) 40 MG tablet Take 1 tablet by mouth daily 25  Yes ProviderPhillip MD   losartan (COZAAR) 100 MG tablet Take 1 tablet by mouth daily 25  Yes ProviderPhillip MD   fexofenadine-pseudoephedrine (ALLEGRA-D 12HR)  MG per extended release tablet Take 1 tablet by mouth in the morning and 1 tablet in the evening.   Yes ProviderPhillip MD   ascorbic acid (VITAMIN C) 100 MG tablet Take by mouth   Yes Provider, MD Phillip   azelastine (ASTELIN) 0.1 % nasal spray Administer into each nostril.   Yes ProviderPhillip MD   Multiple Vitamin (MULTI-VITAMIN) TABS Take 1 tablet by mouth daily   Yes ProviderPhillip MD   Zinc Acetate (GALZIN) 50 MG capsule Take 1 capsule by mouth daily 25  Yes ProviderPhillip MD   meloxicam (MOBIC) 7.5 MG tablet Take 1 tablet by mouth daily 25   Phillip Elmore MD   cyclobenzaprine (FLEXERIL) 10 MG tablet Take 1 tablet by mouth 3 times daily  Patient not taking: Reported on 2025    ProviderPhillip MD       Current medications:    Current Facility-Administered Medications   Medication Dose Route Frequency Provider Last Rate Last Admin    tranexamic acid (LYSTEDA) tablet 1,950 mg  1,950 mg Oral 90 Min Pre-Op Carmela Zhou, APRN

## 2025-05-23 NOTE — ANESTHESIA POSTPROCEDURE EVALUATION
Department of Anesthesiology  Postprocedure Note    Patient: Allen Zambrano  MRN: [id number]  YOB: 1955  Date of evaluation: 5/23/2025    Procedure Summary       Date: 05/23/25 Room / Location: [address]    Anesthesia Start: 1208 Anesthesia Stop: 1435    Procedure: RIGHT KNEE TOTAL KNEE  ARTHROPLASTY. REMOVAL OF DEEP IMPLANT (Right) Diagnosis:       Osteoarthritis of right knee, unspecified osteoarthritis type      (Osteoarthritis of right knee, unspecified osteoarthritis type [M17.11])    Surgeons: Sam Hutchison MD Responsible Provider: Jarrod Otriz DO    Anesthesia Type: spinal, regional ASA Status: 2            Anesthesia Type: No value filed.    Jarrett Phase I: Jarrett Score: 10    Jarrett Phase II:      Anesthesia Post Evaluation    Patient location during evaluation: PACU  Patient participation: complete - patient participated  Level of consciousness: awake and alert  Airway patency: patent  Nausea & Vomiting: no nausea and no vomiting  Cardiovascular status: blood pressure returned to baseline and hemodynamically stable  Respiratory status: acceptable  Hydration status: euvolemic  Pain management: adequate        No notable events documented.

## 2025-05-23 NOTE — H&P
Interval History and Physical    I have interviewed and examined the patient and reviewed the recent History and Physical.  There have been no changes to the recent H&P documentation.    The patient understands the planned operation and its associated risks and benefits and agrees to proceed.    The surgical consent form has been signed.    There were no vitals taken for this visit.     Electronically signed by Sam Hutchison MD on 5/23/2025 at 10:21 AM     
Positive effusion.  Positive varus deformity.  Current range of motion is 2 degrees to 125 degrees.  NEURO:  CN 2 through 12 grossly intact.    ASSESSMENT:  Right knee degenerative joint disease.    PLAN:  Right total knee replacement with hardware removal.          ALESSANDRO LING MD    D:  05/21/2025 17:13:46     T:  05/21/2025 17:35:19     /AQS  Job #:  [id number]     Doc#:  [id number]

## 2025-05-23 NOTE — ANESTHESIA PROCEDURE NOTES
Peripheral Block    Patient location during procedure: pre-op  Reason for block: post-op pain management and at surgeon's request  Start time: 5/23/2025 12:08 PM  End time: 5/23/2025 12:18 PM  Staffing  Performed: anesthesiologist   Anesthesiologist: Jarrod Ortiz DO  Performed by: Jarrod Ortiz DO  Authorized by: Jarrod Ortiz DO    Preanesthetic Checklist  Completed: patient identified, IV checked, site marked, risks and benefits discussed, surgical/procedural consents, equipment checked, pre-op evaluation, timeout performed, anesthesia consent given, oxygen available and monitors applied/VS acknowledged  Peripheral Block   Patient position: supine  Prep: ChloraPrep  Provider prep: mask and sterile gloves (Sterile probe cover)  Patient monitoring: cardiac monitor, continuous pulse ox, frequent blood pressure checks and IV access  Block type: Saphenous and iPacks  Laterality: right  Injection technique: single-shot  Guidance: ultrasound guided  Local infiltration: lidocaine  Infiltration strength: 1 %  Local infiltration: lidocaine  Dose: 5 mL    Needle   Needle type: combined needle/nerve stimulator   Needle gauge: 22 G  Needle localization: anatomical landmarks and ultrasound guidance  Needle length: 10 cm  Assessment   Injection assessment: negative aspiration for heme, no paresthesia on injection and local visualized surrounding nerve on ultrasound  Paresthesia pain: immediately resolved  Slow fractionated injection: yes  Hemodynamics: stable  Outcomes: uncomplicated and patient tolerated procedure well    Additional Notes  Ultrasound guidance used to view needle for placement.    Ultrasound image stored,  printed and saved in patient chart.    Sterile probe cover used    Medications Administered  ropivacaine (NAROPIN) injection 0.5% - Perineural   30 mL - 5/23/2025 12:08:00 PM

## 2025-05-23 NOTE — DISCHARGE INSTRUCTIONS
off at night)   Discharging RN who has gone over instructions and acknowledges ramsey hose have been received

## 2025-05-24 LAB
ANION GAP SERPL CALCULATED.3IONS-SCNC: 9 MEQ/L (ref 9–15)
BUN SERPL-MCNC: 11 MG/DL (ref 8–23)
CALCIUM SERPL-MCNC: 8.4 MG/DL (ref 8.5–9.9)
CHLORIDE SERPL-SCNC: 104 MEQ/L (ref 95–107)
CO2 SERPL-SCNC: 22 MEQ/L (ref 20–31)
CREAT SERPL-MCNC: 0.9 MG/DL (ref 0.7–1.2)
ERYTHROCYTE [DISTWIDTH] IN BLOOD BY AUTOMATED COUNT: 12.2 % (ref 11.5–14.5)
GLUCOSE SERPL-MCNC: 98 MG/DL (ref 70–99)
HCT VFR BLD AUTO: 34 % (ref 42–52)
HGB BLD-MCNC: 11.9 G/DL (ref 14–18)
MCH RBC QN AUTO: 31.7 PG (ref 27–31.3)
MCHC RBC AUTO-ENTMCNC: 35 % (ref 33–37)
MCV RBC AUTO: 90.7 FL (ref 79–92.2)
PLATELET # BLD AUTO: 207 K/UL (ref 130–400)
POTASSIUM SERPL-SCNC: 3.9 MEQ/L (ref 3.4–4.9)
RBC # BLD AUTO: 3.75 M/UL (ref 4.7–6.1)
SODIUM SERPL-SCNC: 135 MEQ/L (ref 135–144)
WBC # BLD AUTO: 11.1 K/UL (ref 4.8–10.8)

## 2025-05-24 PROCEDURE — 2700000000 HC OXYGEN THERAPY PER DAY

## 2025-05-24 PROCEDURE — 96374 THER/PROPH/DIAG INJ IV PUSH: CPT

## 2025-05-24 PROCEDURE — 2500000003 HC RX 250 WO HCPCS

## 2025-05-24 PROCEDURE — 97110 THERAPEUTIC EXERCISES: CPT

## 2025-05-24 PROCEDURE — 97162 PT EVAL MOD COMPLEX 30 MIN: CPT

## 2025-05-24 PROCEDURE — 2580000003 HC RX 258

## 2025-05-24 PROCEDURE — 96361 HYDRATE IV INFUSION ADD-ON: CPT

## 2025-05-24 PROCEDURE — 2500000003 HC RX 250 WO HCPCS: Performed by: ORTHOPAEDIC SURGERY

## 2025-05-24 PROCEDURE — 80048 BASIC METABOLIC PNL TOTAL CA: CPT

## 2025-05-24 PROCEDURE — 6360000002 HC RX W HCPCS: Performed by: NURSE PRACTITIONER

## 2025-05-24 PROCEDURE — 97166 OT EVAL MOD COMPLEX 45 MIN: CPT

## 2025-05-24 PROCEDURE — G0378 HOSPITAL OBSERVATION PER HR: HCPCS

## 2025-05-24 PROCEDURE — 6370000000 HC RX 637 (ALT 250 FOR IP): Performed by: REGISTERED NURSE

## 2025-05-24 PROCEDURE — 2580000003 HC RX 258: Performed by: INTERNAL MEDICINE

## 2025-05-24 PROCEDURE — 85027 COMPLETE CBC AUTOMATED: CPT

## 2025-05-24 PROCEDURE — 2500000003 HC RX 250 WO HCPCS: Performed by: INTERNAL MEDICINE

## 2025-05-24 PROCEDURE — 97116 GAIT TRAINING THERAPY: CPT

## 2025-05-24 PROCEDURE — 6370000000 HC RX 637 (ALT 250 FOR IP): Performed by: NURSE PRACTITIONER

## 2025-05-24 PROCEDURE — 2500000003 HC RX 250 WO HCPCS: Performed by: NURSE PRACTITIONER

## 2025-05-24 PROCEDURE — 36415 COLL VENOUS BLD VENIPUNCTURE: CPT

## 2025-05-24 PROCEDURE — 96376 TX/PRO/DX INJ SAME DRUG ADON: CPT

## 2025-05-24 PROCEDURE — 96375 TX/PRO/DX INJ NEW DRUG ADDON: CPT

## 2025-05-24 RX ORDER — CYCLOBENZAPRINE HCL 10 MG
10 TABLET ORAL 3 TIMES DAILY PRN
Status: CANCELLED | OUTPATIENT
Start: 2025-05-24

## 2025-05-24 RX ORDER — SODIUM CHLORIDE, SODIUM LACTATE, POTASSIUM CHLORIDE, AND CALCIUM CHLORIDE .6; .31; .03; .02 G/100ML; G/100ML; G/100ML; G/100ML
1000 INJECTION, SOLUTION INTRAVENOUS ONCE
Status: COMPLETED | OUTPATIENT
Start: 2025-05-24 | End: 2025-05-24

## 2025-05-24 RX ORDER — OXYCODONE HYDROCHLORIDE 5 MG/1
5 TABLET ORAL EVERY 4 HOURS PRN
Refills: 0 | Status: CANCELLED | OUTPATIENT
Start: 2025-05-24

## 2025-05-24 RX ORDER — ACETAMINOPHEN 325 MG/1
650 TABLET ORAL EVERY 6 HOURS
Status: CANCELLED | OUTPATIENT
Start: 2025-05-24

## 2025-05-24 RX ORDER — LOSARTAN POTASSIUM 100 MG/1
100 TABLET ORAL DAILY
Status: CANCELLED | OUTPATIENT
Start: 2025-05-25

## 2025-05-24 RX ORDER — HYDROXYZINE HYDROCHLORIDE 10 MG/1
10 TABLET, FILM COATED ORAL 3 TIMES DAILY
Status: CANCELLED | OUTPATIENT
Start: 2025-05-24

## 2025-05-24 RX ORDER — METOPROLOL TARTRATE 1 MG/ML
5 INJECTION, SOLUTION INTRAVENOUS ONCE
Status: COMPLETED | OUTPATIENT
Start: 2025-05-25 | End: 2025-05-24

## 2025-05-24 RX ORDER — OXYCODONE HYDROCHLORIDE 5 MG/1
2.5 TABLET ORAL EVERY 4 HOURS PRN
Refills: 0 | Status: CANCELLED | OUTPATIENT
Start: 2025-05-24

## 2025-05-24 RX ORDER — METOPROLOL SUCCINATE 50 MG/1
50 TABLET, EXTENDED RELEASE ORAL DAILY
Status: CANCELLED | OUTPATIENT
Start: 2025-05-25

## 2025-05-24 RX ORDER — ASPIRIN 81 MG/1
81 TABLET ORAL 2 TIMES DAILY
Status: CANCELLED | OUTPATIENT
Start: 2025-05-24

## 2025-05-24 RX ORDER — SENNA AND DOCUSATE SODIUM 50; 8.6 MG/1; MG/1
1 TABLET, FILM COATED ORAL 2 TIMES DAILY
Status: CANCELLED | OUTPATIENT
Start: 2025-05-24

## 2025-05-24 RX ORDER — METOPROLOL TARTRATE 1 MG/ML
5 INJECTION, SOLUTION INTRAVENOUS EVERY 6 HOURS PRN
Status: DISCONTINUED | OUTPATIENT
Start: 2025-05-24 | End: 2025-05-26 | Stop reason: HOSPADM

## 2025-05-24 RX ORDER — MAGNESIUM HYDROXIDE/ALUMINUM HYDROXICE/SIMETHICONE 120; 1200; 1200 MG/30ML; MG/30ML; MG/30ML
15 SUSPENSION ORAL EVERY 6 HOURS PRN
Status: CANCELLED | OUTPATIENT
Start: 2025-05-24

## 2025-05-24 RX ORDER — METOPROLOL TARTRATE 1 MG/ML
5 INJECTION, SOLUTION INTRAVENOUS ONCE
Status: COMPLETED | OUTPATIENT
Start: 2025-05-24 | End: 2025-05-24

## 2025-05-24 RX ORDER — 0.9 % SODIUM CHLORIDE 0.9 %
500 INTRAVENOUS SOLUTION INTRAVENOUS ONCE
Status: COMPLETED | OUTPATIENT
Start: 2025-05-25 | End: 2025-05-25

## 2025-05-24 RX ORDER — ROSUVASTATIN CALCIUM 10 MG/1
40 TABLET, COATED ORAL NIGHTLY
Status: CANCELLED | OUTPATIENT
Start: 2025-05-24

## 2025-05-24 RX ADMIN — ACETAMINOPHEN 650 MG: 325 TABLET ORAL at 20:52

## 2025-05-24 RX ADMIN — SENNOSIDES AND DOCUSATE SODIUM 1 TABLET: 50; 8.6 TABLET ORAL at 09:21

## 2025-05-24 RX ADMIN — METOPROLOL SUCCINATE 50 MG: 50 TABLET, EXTENDED RELEASE ORAL at 09:22

## 2025-05-24 RX ADMIN — METOPROLOL TARTRATE 5 MG: 5 INJECTION INTRAVENOUS at 21:02

## 2025-05-24 RX ADMIN — OXYCODONE 5 MG: 5 TABLET ORAL at 10:45

## 2025-05-24 RX ADMIN — ACETAMINOPHEN 650 MG: 325 TABLET ORAL at 10:44

## 2025-05-24 RX ADMIN — ACETAMINOPHEN 650 MG: 325 TABLET ORAL at 04:38

## 2025-05-24 RX ADMIN — TRANEXAMIC ACID 1950 MG: 650 TABLET ORAL at 06:18

## 2025-05-24 RX ADMIN — OXYCODONE 5 MG: 5 TABLET ORAL at 23:03

## 2025-05-24 RX ADMIN — KETOROLAC TROMETHAMINE 7.5 MG: 15 INJECTION, SOLUTION INTRAMUSCULAR; INTRAVENOUS at 04:39

## 2025-05-24 RX ADMIN — SODIUM CHLORIDE 500 ML: 0.9 INJECTION, SOLUTION INTRAVENOUS at 23:52

## 2025-05-24 RX ADMIN — ROSUVASTATIN CALCIUM 40 MG: 10 TABLET, FILM COATED ORAL at 20:26

## 2025-05-24 RX ADMIN — SENNOSIDES AND DOCUSATE SODIUM 1 TABLET: 50; 8.6 TABLET ORAL at 20:25

## 2025-05-24 RX ADMIN — ASPIRIN 81 MG: 81 TABLET, COATED ORAL at 20:26

## 2025-05-24 RX ADMIN — ACETAMINOPHEN 650 MG: 325 TABLET ORAL at 17:47

## 2025-05-24 RX ADMIN — CYCLOBENZAPRINE 10 MG: 10 TABLET, FILM COATED ORAL at 09:20

## 2025-05-24 RX ADMIN — ASPIRIN 81 MG: 81 TABLET, COATED ORAL at 09:21

## 2025-05-24 RX ADMIN — Medication 10 ML: at 09:21

## 2025-05-24 RX ADMIN — OXYCODONE 5 MG: 5 TABLET ORAL at 14:59

## 2025-05-24 RX ADMIN — KETOROLAC TROMETHAMINE 7.5 MG: 15 INJECTION, SOLUTION INTRAMUSCULAR; INTRAVENOUS at 10:45

## 2025-05-24 RX ADMIN — HYDROXYZINE HYDROCHLORIDE 10 MG: 10 TABLET ORAL at 20:25

## 2025-05-24 RX ADMIN — WATER 2000 MG: 1 INJECTION INTRAMUSCULAR; INTRAVENOUS; SUBCUTANEOUS at 04:43

## 2025-05-24 RX ADMIN — HYDROXYZINE HYDROCHLORIDE 10 MG: 10 TABLET ORAL at 09:21

## 2025-05-24 RX ADMIN — CYCLOBENZAPRINE 10 MG: 10 TABLET, FILM COATED ORAL at 23:57

## 2025-05-24 RX ADMIN — HYDROXYZINE HYDROCHLORIDE 10 MG: 10 TABLET ORAL at 14:59

## 2025-05-24 RX ADMIN — OXYCODONE 5 MG: 5 TABLET ORAL at 06:20

## 2025-05-24 RX ADMIN — METOPROLOL TARTRATE 5 MG: 5 INJECTION, SOLUTION INTRAVENOUS at 08:03

## 2025-05-24 RX ADMIN — SODIUM CHLORIDE, SODIUM LACTATE, POTASSIUM CHLORIDE, AND CALCIUM CHLORIDE 1000 ML: .6; .31; .03; .02 INJECTION, SOLUTION INTRAVENOUS at 12:21

## 2025-05-24 RX ADMIN — METOPROLOL TARTRATE 5 MG: 5 INJECTION INTRAVENOUS at 23:52

## 2025-05-24 RX ADMIN — Medication 10 ML: at 20:28

## 2025-05-24 RX ADMIN — CYCLOBENZAPRINE 10 MG: 10 TABLET, FILM COATED ORAL at 17:48

## 2025-05-24 ASSESSMENT — PAIN SCALES - GENERAL
PAINLEVEL_OUTOF10: 7
PAINLEVEL_OUTOF10: 6
PAINLEVEL_OUTOF10: 3
PAINLEVEL_OUTOF10: 6
PAINLEVEL_OUTOF10: 5
PAINLEVEL_OUTOF10: 8
PAINLEVEL_OUTOF10: 7
PAINLEVEL_OUTOF10: 8
PAINLEVEL_OUTOF10: 9
PAINLEVEL_OUTOF10: 3
PAINLEVEL_OUTOF10: 5
PAINLEVEL_OUTOF10: 6
PAINLEVEL_OUTOF10: 0

## 2025-05-24 ASSESSMENT — PAIN DESCRIPTION - ORIENTATION
ORIENTATION: RIGHT

## 2025-05-24 ASSESSMENT — PAIN DESCRIPTION - DESCRIPTORS
DESCRIPTORS: ACHING
DESCRIPTORS: SPASM
DESCRIPTORS: ACHING;THROBBING
DESCRIPTORS: ACHING
DESCRIPTORS: ACHING;SORE
DESCRIPTORS: ACHING
DESCRIPTORS: THROBBING;BURNING
DESCRIPTORS: ACHING
DESCRIPTORS: ACHING

## 2025-05-24 ASSESSMENT — PAIN DESCRIPTION - LOCATION
LOCATION: KNEE
LOCATION: KNEE
LOCATION: KNEE;LEG
LOCATION: KNEE

## 2025-05-24 ASSESSMENT — PAIN SCALES - WONG BAKER: WONGBAKER_NUMERICALRESPONSE: NO HURT

## 2025-05-24 NOTE — CARE COORDINATION
EDGARDW spoke with Nicolle this AM. Per Nicolle, Pt was a RR this AM. Pending acceptance at this time. Still need PT/OT eval.     Electronically signed by JOLENE Pacheco on 5/24/2025 at 10:36 AM

## 2025-05-24 NOTE — CARE COORDINATION
[facility] Pre-Admission Screening Document      Patient Name: Allen Zambrano       MRN: [id number]    : 1955    Age: 69 y.o.  Gender: male   Payor: Payor: MEDICARE / Plan: MEDICARE PART A AND B / Product Type: *No Product type* /   MSSP: No    Admitted from: [facility] Floor: 2W  Attending Care Provider: Sam Hutchison MD  Inpatient Rehab Referring Care Provider: Dr. Sam Hutchison  Primary Care Provider: Eugene Youssef MD  Inpatient Treatment Team including Consults: Treatment Team:   Sam Hutchison MD Zanotti, Robert M, MD Chatha, Naveen, MD Taylor, Eleni Moyer, NORMA Pedroza Sheri L, RN Hipp, Lisa, RN    Reason for Hospitalization: No diagnosis found.  No chief complaint on file.    Isolation:No active isolations    Hospital Course:  Admit Date: 2025  9:48 AM  Inpatient Rehab Referral Date: 25  Narrative of hospital course/history of present illness: 69 y.o. male had an ACL reconstruction done at least 30 years ago. He has since developed significant osteoarthritis. Patient has treated conservatively over the years, but is no longer providing relief. Patient elected to proceed with a RT Total Knee Replacement with removal of hardware. This was performed on 25.      Ortho 25:  Assessment:  Patient status post total knee arthoplasty 2025     Plan:    PT/OT:   DVT proph   Analgesics  Discharge planning       Medical & Surgical History/Current Comorbidities:  Past Medical History:   Diagnosis Date    Hyperlipidemia      Past Surgical History:   Procedure Laterality Date    ANTERIOR CRUCIATE LIGAMENT REPAIR Right     COLONOSCOPY         Advanced Directives:  Advance Directives       Power of  Living Will ACP-Advance Directive ACP-Power of     Not on File Not on File Not on File Not on File            Labs/Infection Control:  Recent Labs     25  0530   WBC 11.1*   HGB 11.9*   HCT

## 2025-05-24 NOTE — PLAN OF CARE
Therapy evaluation completed.  Please see daily notes and/or progress notes for details related to planned treatment interventions, goals and functional performance.    Electronically signed by Lori Wilde PT on 5/24/25 at 11:39 AM EDT

## 2025-05-24 NOTE — CARE COORDINATION
Met with patient and daughterNicole regarding Inpatient Rehab Facility (IRF) referral. Discussed IRF mission, goals, daily process, 3 hours of intensive therapy per day, Team, Medicare coverage, approximate length of stay, same doctors that visit on Medical can visit on IRF, and that we are still waiting for PT/OT evaluations, and PM&R approval. Patient stated that he was evaluated. We will wait for evaluations to be posted. Questions asked and understanding verbalized. Using [place] of Choice, the patient and daughter chose [facility] if appropriate and approved by PM&R. Discussed possible admit to IRF tomorrow. Electronically signed by Ernie Moyer RN on 5/24/25 at 11:41 AM EDT

## 2025-05-25 LAB
ALBUMIN SERPL-MCNC: 3.4 G/DL (ref 3.5–4.6)
ALP SERPL-CCNC: 49 U/L (ref 35–104)
ALT SERPL-CCNC: 7 U/L (ref 0–41)
ANION GAP SERPL CALCULATED.3IONS-SCNC: 8 MEQ/L (ref 9–15)
ANISOCYTOSIS BLD QL SMEAR: ABNORMAL
AST SERPL-CCNC: 13 U/L (ref 0–40)
BASOPHILS # BLD: 0 K/UL (ref 0–0.2)
BASOPHILS NFR BLD: 0.5 %
BILIRUB SERPL-MCNC: 0.5 MG/DL (ref 0.2–0.7)
BUN SERPL-MCNC: 9 MG/DL (ref 8–23)
CALCIUM SERPL-MCNC: 8.3 MG/DL (ref 8.5–9.9)
CHLORIDE SERPL-SCNC: 103 MEQ/L (ref 95–107)
CO2 SERPL-SCNC: 21 MEQ/L (ref 20–31)
CREAT SERPL-MCNC: 0.75 MG/DL (ref 0.7–1.2)
EOSINOPHIL # BLD: 0.3 K/UL (ref 0–0.7)
EOSINOPHIL NFR BLD: 2 %
ERYTHROCYTE [DISTWIDTH] IN BLOOD BY AUTOMATED COUNT: 12.2 % (ref 11.5–14.5)
GLOBULIN SER CALC-MCNC: 2.6 G/DL (ref 2.3–3.5)
GLUCOSE BLD-MCNC: 155 MG/DL (ref 70–99)
GLUCOSE SERPL-MCNC: 129 MG/DL (ref 70–99)
HCT VFR BLD AUTO: 33.6 % (ref 42–52)
HGB BLD-MCNC: 11.4 G/DL (ref 14–18)
LYMPHOCYTES # BLD: 1.1 K/UL (ref 1–4.8)
LYMPHOCYTES NFR BLD: 8 %
MAGNESIUM SERPL-MCNC: 2 MG/DL (ref 1.7–2.4)
MCH RBC QN AUTO: 31 PG (ref 27–31.3)
MCHC RBC AUTO-ENTMCNC: 33.9 % (ref 33–37)
MCV RBC AUTO: 91.3 FL (ref 79–92.2)
MONOCYTES # BLD: 0.6 K/UL (ref 0.2–0.8)
MONOCYTES NFR BLD: 4.8 %
NEUTROPHILS # BLD: 10.6 K/UL (ref 1.4–6.5)
NEUTS SEG NFR BLD: 85 %
PERFORMED ON: ABNORMAL
PLATELET # BLD AUTO: 193 K/UL (ref 130–400)
PLATELET BLD QL SMEAR: ADEQUATE
POIKILOCYTOSIS BLD QL SMEAR: ABNORMAL
POTASSIUM SERPL-SCNC: 4 MEQ/L (ref 3.4–4.9)
PROT SERPL-MCNC: 6 G/DL (ref 6.3–8)
RBC # BLD AUTO: 3.68 M/UL (ref 4.7–6.1)
SLIDE REVIEW: ABNORMAL
SMUDGE CELLS BLD QL SMEAR: 1
SODIUM SERPL-SCNC: 132 MEQ/L (ref 135–144)
VARIANT LYMPHS NFR BLD: 1 %
WBC # BLD AUTO: 12.5 K/UL (ref 4.8–10.8)

## 2025-05-25 PROCEDURE — 99291 CRITICAL CARE FIRST HOUR: CPT | Performed by: INTERNAL MEDICINE

## 2025-05-25 PROCEDURE — 6370000000 HC RX 637 (ALT 250 FOR IP): Performed by: REGISTERED NURSE

## 2025-05-25 PROCEDURE — 85025 COMPLETE CBC W/AUTO DIFF WBC: CPT

## 2025-05-25 PROCEDURE — 83735 ASSAY OF MAGNESIUM: CPT

## 2025-05-25 PROCEDURE — 93005 ELECTROCARDIOGRAM TRACING: CPT | Performed by: INTERNAL MEDICINE

## 2025-05-25 PROCEDURE — 96376 TX/PRO/DX INJ SAME DRUG ADON: CPT

## 2025-05-25 PROCEDURE — 80053 COMPREHEN METABOLIC PANEL: CPT

## 2025-05-25 PROCEDURE — 6370000000 HC RX 637 (ALT 250 FOR IP): Performed by: NURSE PRACTITIONER

## 2025-05-25 PROCEDURE — 2500000003 HC RX 250 WO HCPCS

## 2025-05-25 PROCEDURE — 2500000003 HC RX 250 WO HCPCS: Performed by: INTERNAL MEDICINE

## 2025-05-25 PROCEDURE — 2580000003 HC RX 258: Performed by: INTERNAL MEDICINE

## 2025-05-25 PROCEDURE — 36415 COLL VENOUS BLD VENIPUNCTURE: CPT

## 2025-05-25 PROCEDURE — 6360000002 HC RX W HCPCS: Performed by: INTERNAL MEDICINE

## 2025-05-25 PROCEDURE — 2500000003 HC RX 250 WO HCPCS: Performed by: NURSE PRACTITIONER

## 2025-05-25 PROCEDURE — 2580000003 HC RX 258

## 2025-05-25 PROCEDURE — 97116 GAIT TRAINING THERAPY: CPT

## 2025-05-25 PROCEDURE — 2060000000 HC ICU INTERMEDIATE R&B

## 2025-05-25 PROCEDURE — 96361 HYDRATE IV INFUSION ADD-ON: CPT

## 2025-05-25 RX ORDER — ADENOSINE 3 MG/ML
12 INJECTION INTRAVENOUS ONCE
Status: COMPLETED | OUTPATIENT
Start: 2025-05-25 | End: 2025-05-25

## 2025-05-25 RX ORDER — ADENOSINE 3 MG/ML
6 INJECTION, SOLUTION INTRAVENOUS ONCE
Status: COMPLETED | OUTPATIENT
Start: 2025-05-25 | End: 2025-05-25

## 2025-05-25 RX ORDER — METOPROLOL TARTRATE 1 MG/ML
5 INJECTION, SOLUTION INTRAVENOUS ONCE
Status: COMPLETED | OUTPATIENT
Start: 2025-05-25 | End: 2025-05-25

## 2025-05-25 RX ORDER — DILTIAZEM HYDROCHLORIDE 5 MG/ML
10 INJECTION INTRAVENOUS ONCE
Status: COMPLETED | OUTPATIENT
Start: 2025-05-25 | End: 2025-05-25

## 2025-05-25 RX ORDER — METOPROLOL TARTRATE 1 MG/ML
5 INJECTION, SOLUTION INTRAVENOUS EVERY 6 HOURS
Status: DISCONTINUED | OUTPATIENT
Start: 2025-05-25 | End: 2025-05-25

## 2025-05-25 RX ORDER — 0.9 % SODIUM CHLORIDE 0.9 %
500 INTRAVENOUS SOLUTION INTRAVENOUS ONCE
Status: COMPLETED | OUTPATIENT
Start: 2025-05-25 | End: 2025-05-25

## 2025-05-25 RX ADMIN — HYDROXYZINE HYDROCHLORIDE 10 MG: 10 TABLET ORAL at 14:20

## 2025-05-25 RX ADMIN — ASPIRIN 81 MG: 81 TABLET, COATED ORAL at 20:58

## 2025-05-25 RX ADMIN — Medication 10 ML: at 10:23

## 2025-05-25 RX ADMIN — METOPROLOL SUCCINATE 50 MG: 50 TABLET, EXTENDED RELEASE ORAL at 10:21

## 2025-05-25 RX ADMIN — ACETAMINOPHEN 650 MG: 325 TABLET ORAL at 10:19

## 2025-05-25 RX ADMIN — SENNOSIDES AND DOCUSATE SODIUM 1 TABLET: 50; 8.6 TABLET ORAL at 10:19

## 2025-05-25 RX ADMIN — METOPROLOL TARTRATE 5 MG: 5 INJECTION INTRAVENOUS at 02:39

## 2025-05-25 RX ADMIN — ADENOSINE 12 MG: 3 INJECTION, SOLUTION INTRAVENOUS at 10:42

## 2025-05-25 RX ADMIN — ROSUVASTATIN CALCIUM 40 MG: 10 TABLET, FILM COATED ORAL at 20:58

## 2025-05-25 RX ADMIN — LOSARTAN POTASSIUM 100 MG: 100 TABLET, FILM COATED ORAL at 10:22

## 2025-05-25 RX ADMIN — OXYCODONE 5 MG: 5 TABLET ORAL at 14:20

## 2025-05-25 RX ADMIN — METOPROLOL TARTRATE 5 MG: 5 INJECTION INTRAVENOUS at 07:05

## 2025-05-25 RX ADMIN — Medication 10 ML: at 21:00

## 2025-05-25 RX ADMIN — ACETAMINOPHEN 650 MG: 325 TABLET ORAL at 16:48

## 2025-05-25 RX ADMIN — ASPIRIN 81 MG: 81 TABLET, COATED ORAL at 10:19

## 2025-05-25 RX ADMIN — HYDROXYZINE HYDROCHLORIDE 10 MG: 10 TABLET ORAL at 10:21

## 2025-05-25 RX ADMIN — ACETAMINOPHEN 650 MG: 325 TABLET ORAL at 20:57

## 2025-05-25 RX ADMIN — HYDROXYZINE HYDROCHLORIDE 10 MG: 10 TABLET ORAL at 20:58

## 2025-05-25 RX ADMIN — DILTIAZEM HYDROCHLORIDE 10 MG: 5 INJECTION, SOLUTION INTRAVENOUS at 12:31

## 2025-05-25 RX ADMIN — METOPROLOL TARTRATE 5 MG: 5 INJECTION INTRAVENOUS at 01:03

## 2025-05-25 RX ADMIN — ADENOSINE 6 MG: 3 INJECTION, SOLUTION INTRAVENOUS at 10:37

## 2025-05-25 RX ADMIN — DILTIAZEM HYDROCHLORIDE 5 MG/HR: 5 INJECTION INTRAVENOUS at 12:38

## 2025-05-25 RX ADMIN — SENNOSIDES AND DOCUSATE SODIUM 1 TABLET: 50; 8.6 TABLET ORAL at 20:58

## 2025-05-25 RX ADMIN — SODIUM CHLORIDE 500 ML: 0.9 INJECTION, SOLUTION INTRAVENOUS at 01:03

## 2025-05-25 ASSESSMENT — PAIN SCALES - GENERAL
PAINLEVEL_OUTOF10: 3
PAINLEVEL_OUTOF10: 5
PAINLEVEL_OUTOF10: 5
PAINLEVEL_OUTOF10: 3
PAINLEVEL_OUTOF10: 9

## 2025-05-25 ASSESSMENT — PAIN DESCRIPTION - LOCATION
LOCATION: KNEE
LOCATION: KNEE

## 2025-05-25 ASSESSMENT — PAIN SCALES - WONG BAKER: WONGBAKER_NUMERICALRESPONSE: NO HURT

## 2025-05-25 ASSESSMENT — PAIN DESCRIPTION - ORIENTATION
ORIENTATION: RIGHT
ORIENTATION: RIGHT

## 2025-05-25 ASSESSMENT — PAIN DESCRIPTION - DESCRIPTORS: DESCRIPTORS: ACHING

## 2025-05-25 NOTE — CONSULTS
[place], [place]    CARDIOLOGY CONSULTATION REPORT    DATE OF CONSULTATION  5/25/2025    CONSULTANT  Emory Hansen DO     REQUESTING PHYSICIAN  Sam Hutchison MD     PRIMARY CARDIOLOGIST  Dr. Colletta - [facility]     REASON FOR CONSULTATION  Persistent tachycardia     HISTORY OF PRESENT ILLNESS  Allen Zambrano is a 69 y.o. male wit history of pSVT/atrial tachycardia on beta blocker, HTN, and hyperlipidemia admitted for elective TKA. He has been having episodes of tachycardia. Today he has noticed increased palpitations that started about 4 hours ago. He denies any SOB, dizziness, chest pain, near syncope or syncope. He says the palpitations are similar to prior episodes of SVT. EKG obtained STAT and confims pSVT. HR 160s. BP has been stable.     Allergies  No Known Allergies    Medications   adenosine, 12 mg, Once  adenosine, 6 mg, Once  sodium chloride flush, 5-40 mL, 2 times per day  acetaminophen, 650 mg, Q6H  sennosides-docusate sodium, 1 tablet, BID  aspirin, 81 mg, BID  hydrOXYzine HCl, 10 mg, TID  losartan, 100 mg, Daily  metoprolol succinate, 50 mg, Daily  rosuvastatin, 40 mg, Nightly        Past Medical History:   Diagnosis Date    Hyperlipidemia       Past Surgical History:   Procedure Laterality Date    ANTERIOR CRUCIATE LIGAMENT REPAIR Right     COLONOSCOPY        Social History     Tobacco Use    Smoking status: Former     Types: Cigarettes    Smokeless tobacco: Former   Substance Use Topics    Alcohol use: Yes     Comment: socially      History reviewed. No pertinent family history.     Review of Systems  As noted in HPI    PHYSICAL EXAM  BP (!) 137/102   Pulse (!) 149   Temp 98.4 °F (36.9 °C) (Oral)   Resp 24   Ht 1.785 m (5' 10.28\")   Wt 99.8 kg (220 lb)   SpO2 95%   BMI 31.32 kg/m²   Constitutional: alert, cooperative, in no distress  Eyes: Conjunctiva clear; no scleral icturus. PERRLA   Respiratory: clear to auscultation bilaterally  Musculoskeletal: No chest wall 
Consult Note            Date:5/23/2025        Patient Name:Allen Zambrano     YOB: 1955     Age:69 y.o.    Reason for Consult: Evaluation and recommendation for chronic disease management postoperatively    Chief Complaint   Right knee osteoarthritis      History Obtained From   patient, electronic medical record    History of Present Illness   Patient is status post right knee arthroplasty postop day 0.  Patient endorses right knee pain unrelieved by conservative management patient denies chest pain, palpitations, headache, dizziness, shortness of breath, cough, fever, chills, N/V/D, and changes in appetite.  Patient has a past medical history of hypertension, hyperlipidemia.  Hospitalist have been consulted for evaluation and recommendation for chronic disease management postoperatively.      Past Medical History     Past Medical History:   Diagnosis Date    Hyperlipidemia         Past Surgical History     Past Surgical History:   Procedure Laterality Date    ANTERIOR CRUCIATE LIGAMENT REPAIR Right     COLONOSCOPY          Medications     Prior to Admission medications    Medication Sig Start Date End Date Taking? Authorizing Provider   metoprolol succinate (TOPROL XL) 25 MG extended release tablet Take 2 tablets by mouth daily 5/2/25  Yes Phillip Elmore MD   rosuvastatin (CRESTOR) 40 MG tablet Take 1 tablet by mouth daily 5/2/25  Yes Phillip Elmore MD   losartan (COZAAR) 100 MG tablet Take 1 tablet by mouth daily 5/2/25  Yes Phillip Elmore MD   fexofenadine-pseudoephedrine (ALLEGRA-D 12HR)  MG per extended release tablet Take 1 tablet by mouth in the morning and 1 tablet in the evening.   Yes Phillip Elmore MD   ascorbic acid (VITAMIN C) 100 MG tablet Take by mouth   Yes Phillip Elmore MD   azelastine (ASTELIN) 0.1 % nasal spray Administer into each nostril.   Yes Phillip Elmore MD   Multiple Vitamin (MULTI-VITAMIN) TABS Take 1 tablet by mouth daily   
tablet 100 mg, 100 mg, Oral, Daily  metoprolol succinate (TOPROL XL) extended release tablet 50 mg, 50 mg, Oral, Daily  rosuvastatin (CRESTOR) tablet 40 mg, 40 mg, Oral, Nightly      Social History:  Social History     Socioeconomic History    Marital status:      Spouse name: Not on file    Number of children: Not on file    Years of education: Not on file    Highest education level: Not on file   Occupational History    Not on file   Tobacco Use    Smoking status: Former     Types: Cigarettes    Smokeless tobacco: Former   Substance and Sexual Activity    Alcohol use: Yes     Comment: socially    Drug use: Never    Sexual activity: Not on file   Other Topics Concern    Not on file   Social History Narrative    Not on file     Social Drivers of Health     Financial Resource Strain: Not on file   Food Insecurity: No Food Insecurity (5/24/2025)    Hunger Vital Sign     Worried About Running Out of Food in the Last Year: Never true     Ran Out of Food in the Last Year: Never true   Transportation Needs: No Transportation Needs (5/24/2025)    PRAPARE - Transportation     Lack of Transportation (Medical): No     Lack of Transportation (Non-Medical): No   Physical Activity: Not on file   Stress: Not on file   Social Connections: Not on file   Intimate Partner Violence: Not on file   Housing Stability: Low Risk  (5/24/2025)    Housing Stability Vital Sign     Unable to Pay for Housing in the Last Year: No     Number of Times Moved in the Last Year: 0     Homeless in the Last Year: No        This history was obtained from family and caregivers and discussed to confirm.      Family History:   History reviewed. No pertinent family history.    Review of Systems:  Review of Systems          Physical Exam:  /61   Pulse 94   Temp 99 °F (37.2 °C) (Oral)   Resp 18   Ht 1.785 m (5' 10.28\")   Wt 99.8 kg (220 lb)   SpO2 99%   BMI 31.32 kg/m²      Physical Exam  Ortho Exam  Neurological Exam        ROS x10:  The

## 2025-05-26 ENCOUNTER — HOSPITAL ENCOUNTER (INPATIENT)
Age: 70
LOS: 2 days | Discharge: HOME HEALTH | DRG: 560 | End: 2025-05-28
Attending: ORTHOPAEDIC SURGERY | Admitting: PHYSICAL MEDICINE & REHABILITATION
Payer: MEDICARE

## 2025-05-26 ENCOUNTER — APPOINTMENT (OUTPATIENT)
Age: 70
DRG: 982 | End: 2025-05-26
Attending: INTERNAL MEDICINE
Payer: MEDICARE

## 2025-05-26 VITALS
HEART RATE: 88 BPM | DIASTOLIC BLOOD PRESSURE: 71 MMHG | TEMPERATURE: 98.5 F | SYSTOLIC BLOOD PRESSURE: 139 MMHG | RESPIRATION RATE: 18 BRPM | OXYGEN SATURATION: 97 % | HEIGHT: 70 IN | BODY MASS INDEX: 31.5 KG/M2 | WEIGHT: 220 LBS

## 2025-05-26 DIAGNOSIS — Z96.651 STATUS POST TOTAL KNEE REPLACEMENT, RIGHT: ICD-10-CM

## 2025-05-26 DIAGNOSIS — G89.18 POST-OP PAIN: Primary | ICD-10-CM

## 2025-05-26 PROBLEM — I47.10 PAROXYSMAL SVT (SUPRAVENTRICULAR TACHYCARDIA): Status: ACTIVE | Noted: 2023-09-19

## 2025-05-26 PROBLEM — E78.5 DYSLIPIDEMIA: Status: ACTIVE | Noted: 2023-09-19

## 2025-05-26 PROBLEM — J30.2 SEASONAL ALLERGIES: Status: ACTIVE | Noted: 2023-09-19

## 2025-05-26 PROBLEM — Z78.9 IMPAIRED MOBILITY AND ACTIVITIES OF DAILY LIVING: Status: ACTIVE | Noted: 2025-05-26

## 2025-05-26 PROBLEM — K21.9 GASTROESOPHAGEAL REFLUX DISEASE WITHOUT ESOPHAGITIS: Status: ACTIVE | Noted: 2023-09-19

## 2025-05-26 PROBLEM — I10 HTN (HYPERTENSION), BENIGN: Status: ACTIVE | Noted: 2023-03-17

## 2025-05-26 PROBLEM — M76.62 ACHILLES TENDINITIS OF LEFT LOWER EXTREMITY: Status: ACTIVE | Noted: 2023-09-19

## 2025-05-26 PROBLEM — N52.9 ERECTILE DYSFUNCTION: Status: ACTIVE | Noted: 2023-05-04

## 2025-05-26 PROBLEM — Z74.09 IMPAIRED MOBILITY AND ACTIVITIES OF DAILY LIVING: Status: ACTIVE | Noted: 2025-05-26

## 2025-05-26 PROBLEM — G47.33 OBSTRUCTIVE SLEEP APNEA: Status: ACTIVE | Noted: 2023-09-19

## 2025-05-26 PROBLEM — M17.11 PRIMARY OSTEOARTHRITIS OF RIGHT KNEE: Status: ACTIVE | Noted: 2024-12-16

## 2025-05-26 LAB
ECHO AO ROOT DIAM: 3.9 CM
ECHO AO ROOT INDEX: 1.79 CM/M2
ECHO AV AREA PEAK VELOCITY: 2.2 CM2
ECHO AV AREA PLAN/BSA: 0.96 CM2/M2
ECHO AV AREA PLAN: 2.1 CM2
ECHO AV AREA VTI: 2.3 CM2
ECHO AV AREA/BSA PEAK VELOCITY: 1 CM2/M2
ECHO AV AREA/BSA VTI: 1.1 CM2/M2
ECHO AV CUSP MM: 1.8 CM
ECHO AV MEAN GRADIENT: 8 MMHG
ECHO AV MEAN VELOCITY: 1.3 M/S
ECHO AV PEAK GRADIENT: 15 MMHG
ECHO AV PEAK VELOCITY: 2 M/S
ECHO AV VELOCITY RATIO: 0.65
ECHO AV VTI: 36.8 CM
ECHO BSA: 2.22 M2
ECHO EST RA PRESSURE: 3 MMHG
ECHO LA DIAMETER INDEX: 1.19 CM/M2
ECHO LA DIAMETER: 2.6 CM
ECHO LA TO AORTIC ROOT RATIO: 0.67
ECHO LA VOL A-L A2C: 80 ML (ref 18–58)
ECHO LA VOL A-L A4C: 63 ML (ref 18–58)
ECHO LA VOL MOD A2C: 76 ML (ref 18–58)
ECHO LA VOL MOD A4C: 61 ML (ref 18–58)
ECHO LA VOLUME AREA LENGTH: 76 ML
ECHO LA VOLUME INDEX A-L A2C: 37 ML/M2 (ref 16–34)
ECHO LA VOLUME INDEX A-L A4C: 29 ML/M2 (ref 16–34)
ECHO LA VOLUME INDEX AREA LENGTH: 35 ML/M2 (ref 16–34)
ECHO LA VOLUME INDEX MOD A2C: 35 ML/M2 (ref 16–34)
ECHO LA VOLUME INDEX MOD A4C: 28 ML/M2 (ref 16–34)
ECHO LV E' LATERAL VELOCITY: 12.14 CM/S
ECHO LV E' SEPTAL VELOCITY: 8.31 CM/S
ECHO LV EDV A2C: 106 ML
ECHO LV EDV A4C: 104 ML
ECHO LV EDV BP: 105 ML (ref 67–155)
ECHO LV EDV INDEX A4C: 48 ML/M2
ECHO LV EDV INDEX BP: 48 ML/M2
ECHO LV EDV NDEX A2C: 49 ML/M2
ECHO LV EF PHYSICIAN: 60 %
ECHO LV EJECTION FRACTION A2C: 52 %
ECHO LV EJECTION FRACTION A4C: 60 %
ECHO LV EJECTION FRACTION BIPLANE: 54 % (ref 55–100)
ECHO LV ESV A2C: 51 ML
ECHO LV ESV A4C: 42 ML
ECHO LV ESV BP: 48 ML (ref 22–58)
ECHO LV ESV INDEX A2C: 23 ML/M2
ECHO LV ESV INDEX A4C: 19 ML/M2
ECHO LV ESV INDEX BP: 22 ML/M2
ECHO LV FRACTIONAL SHORTENING: 33 % (ref 28–44)
ECHO LV INTERNAL DIMENSION DIASTOLE INDEX: 2.25 CM/M2
ECHO LV INTERNAL DIMENSION DIASTOLIC: 4.9 CM (ref 4.2–5.9)
ECHO LV INTERNAL DIMENSION SYSTOLIC INDEX: 1.51 CM/M2
ECHO LV INTERNAL DIMENSION SYSTOLIC: 3.3 CM
ECHO LV IVSD: 1.2 CM (ref 0.6–1)
ECHO LV IVSS: 1.6 CM
ECHO LV MASS 2D: 239.9 G (ref 88–224)
ECHO LV MASS INDEX 2D: 110 G/M2 (ref 49–115)
ECHO LV POSTERIOR WALL DIASTOLIC: 1.3 CM (ref 0.6–1)
ECHO LV POSTERIOR WALL SYSTOLIC: 1.6 CM
ECHO LV RELATIVE WALL THICKNESS RATIO: 0.53
ECHO LVOT AREA: 3.5 CM2
ECHO LVOT AV VTI INDEX: 0.67
ECHO LVOT DIAM: 2.1 CM
ECHO LVOT MEAN GRADIENT: 3 MMHG
ECHO LVOT PEAK GRADIENT: 6 MMHG
ECHO LVOT PEAK VELOCITY: 1.3 M/S
ECHO LVOT STROKE VOLUME INDEX: 39.1 ML/M2
ECHO LVOT SV: 85.2 ML
ECHO LVOT VTI: 24.6 CM
ECHO MV A VELOCITY: 0.98 M/S
ECHO MV AREA VTI: 2.9 CM2
ECHO MV E DECELERATION TIME (DT): 300.7 MS
ECHO MV E VELOCITY: 0.83 M/S
ECHO MV E/A RATIO: 0.85
ECHO MV E/E' LATERAL: 6.84
ECHO MV E/E' RATIO (AVERAGED): 8.41
ECHO MV E/E' SEPTAL: 9.99
ECHO MV LVOT VTI INDEX: 1.2
ECHO MV MAX VELOCITY: 1.1 M/S
ECHO MV MEAN GRADIENT: 3 MMHG
ECHO MV MEAN VELOCITY: 0.9 M/S
ECHO MV PEAK GRADIENT: 5 MMHG
ECHO MV VTI: 29.4 CM
ECHO PV MAX VELOCITY: 1 M/S
ECHO PV PEAK GRADIENT: 4 MMHG
ECHO RIGHT VENTRICULAR SYSTOLIC PRESSURE (RVSP): 42 MMHG
ECHO RV INTERNAL DIMENSION: 3.7 CM
ECHO TV REGURGITANT MAX VELOCITY: 3.11 M/S
ECHO TV REGURGITANT PEAK GRADIENT: 39 MMHG
GLUCOSE BLD-MCNC: 113 MG/DL (ref 70–99)
GLUCOSE BLD-MCNC: 169 MG/DL (ref 70–99)
GLUCOSE BLD-MCNC: 96 MG/DL (ref 70–99)
PERFORMED ON: ABNORMAL
PERFORMED ON: ABNORMAL
PERFORMED ON: NORMAL

## 2025-05-26 PROCEDURE — 97116 GAIT TRAINING THERAPY: CPT

## 2025-05-26 PROCEDURE — 6370000000 HC RX 637 (ALT 250 FOR IP): Performed by: NURSE PRACTITIONER

## 2025-05-26 PROCEDURE — 93306 TTE W/DOPPLER COMPLETE: CPT | Performed by: INTERNAL MEDICINE

## 2025-05-26 PROCEDURE — 97110 THERAPEUTIC EXERCISES: CPT

## 2025-05-26 PROCEDURE — 93306 TTE W/DOPPLER COMPLETE: CPT

## 2025-05-26 PROCEDURE — 97535 SELF CARE MNGMENT TRAINING: CPT

## 2025-05-26 PROCEDURE — 6370000000 HC RX 637 (ALT 250 FOR IP): Performed by: INTERNAL MEDICINE

## 2025-05-26 PROCEDURE — 1180000000 HC REHAB R&B

## 2025-05-26 PROCEDURE — 99232 SBSQ HOSP IP/OBS MODERATE 35: CPT | Performed by: PHYSICAL MEDICINE & REHABILITATION

## 2025-05-26 PROCEDURE — 2500000003 HC RX 250 WO HCPCS: Performed by: NURSE PRACTITIONER

## 2025-05-26 PROCEDURE — 6370000000 HC RX 637 (ALT 250 FOR IP): Performed by: REGISTERED NURSE

## 2025-05-26 RX ORDER — DILTIAZEM HYDROCHLORIDE 120 MG/1
120 CAPSULE, COATED, EXTENDED RELEASE ORAL DAILY
Status: CANCELLED | OUTPATIENT
Start: 2025-05-27

## 2025-05-26 RX ORDER — OXYCODONE HYDROCHLORIDE 5 MG/1
2.5 TABLET ORAL EVERY 4 HOURS PRN
Refills: 0 | Status: DISCONTINUED | OUTPATIENT
Start: 2025-05-26 | End: 2025-05-28 | Stop reason: HOSPADM

## 2025-05-26 RX ORDER — DILTIAZEM HCL 60 MG
30 TABLET ORAL EVERY 6 HOURS SCHEDULED
Status: DISCONTINUED | OUTPATIENT
Start: 2025-05-26 | End: 2025-05-26 | Stop reason: HOSPADM

## 2025-05-26 RX ORDER — ASPIRIN 81 MG/1
81 TABLET ORAL 2 TIMES DAILY
Status: DISCONTINUED | OUTPATIENT
Start: 2025-05-26 | End: 2025-05-28 | Stop reason: HOSPADM

## 2025-05-26 RX ORDER — LOSARTAN POTASSIUM 100 MG/1
100 TABLET ORAL DAILY
Status: DISCONTINUED | OUTPATIENT
Start: 2025-05-27 | End: 2025-05-27

## 2025-05-26 RX ORDER — HYDROXYZINE HYDROCHLORIDE 10 MG/1
10 TABLET, FILM COATED ORAL 3 TIMES DAILY
Status: DISCONTINUED | OUTPATIENT
Start: 2025-05-26 | End: 2025-05-27

## 2025-05-26 RX ORDER — SENNA AND DOCUSATE SODIUM 50; 8.6 MG/1; MG/1
1 TABLET, FILM COATED ORAL 2 TIMES DAILY
Status: DISCONTINUED | OUTPATIENT
Start: 2025-05-26 | End: 2025-05-28 | Stop reason: HOSPADM

## 2025-05-26 RX ORDER — DILTIAZEM HCL 60 MG
30 TABLET ORAL EVERY 6 HOURS SCHEDULED
Status: CANCELLED | OUTPATIENT
Start: 2025-05-27 | End: 2025-05-27

## 2025-05-26 RX ORDER — ACETAMINOPHEN 325 MG/1
650 TABLET ORAL EVERY 6 HOURS
Status: DISCONTINUED | OUTPATIENT
Start: 2025-05-26 | End: 2025-05-28 | Stop reason: HOSPADM

## 2025-05-26 RX ORDER — OXYCODONE HYDROCHLORIDE 5 MG/1
5 TABLET ORAL EVERY 4 HOURS PRN
Refills: 0 | Status: DISCONTINUED | OUTPATIENT
Start: 2025-05-26 | End: 2025-05-28 | Stop reason: HOSPADM

## 2025-05-26 RX ORDER — DILTIAZEM HYDROCHLORIDE 30 MG/1
30 TABLET, FILM COATED ORAL EVERY 6 HOURS SCHEDULED
Status: COMPLETED | OUTPATIENT
Start: 2025-05-27 | End: 2025-05-26

## 2025-05-26 RX ORDER — DILTIAZEM HYDROCHLORIDE 120 MG/1
120 CAPSULE, COATED, EXTENDED RELEASE ORAL DAILY
Status: DISCONTINUED | OUTPATIENT
Start: 2025-05-27 | End: 2025-05-27

## 2025-05-26 RX ORDER — ROSUVASTATIN CALCIUM 40 MG/1
40 TABLET, COATED ORAL NIGHTLY
Status: DISCONTINUED | OUTPATIENT
Start: 2025-05-26 | End: 2025-05-28 | Stop reason: HOSPADM

## 2025-05-26 RX ORDER — METOPROLOL SUCCINATE 50 MG/1
50 TABLET, EXTENDED RELEASE ORAL DAILY
Status: DISCONTINUED | OUTPATIENT
Start: 2025-05-27 | End: 2025-05-28

## 2025-05-26 RX ORDER — METOPROLOL TARTRATE 1 MG/ML
5 INJECTION, SOLUTION INTRAVENOUS EVERY 6 HOURS PRN
Status: CANCELLED | OUTPATIENT
Start: 2025-05-26

## 2025-05-26 RX ORDER — CYCLOBENZAPRINE HCL 10 MG
10 TABLET ORAL 3 TIMES DAILY PRN
Status: DISCONTINUED | OUTPATIENT
Start: 2025-05-26 | End: 2025-05-28 | Stop reason: HOSPADM

## 2025-05-26 RX ORDER — DILTIAZEM HYDROCHLORIDE 120 MG/1
120 CAPSULE, COATED, EXTENDED RELEASE ORAL DAILY
Status: DISCONTINUED | OUTPATIENT
Start: 2025-05-27 | End: 2025-05-26 | Stop reason: HOSPADM

## 2025-05-26 RX ORDER — MAGNESIUM HYDROXIDE/ALUMINUM HYDROXICE/SIMETHICONE 120; 1200; 1200 MG/30ML; MG/30ML; MG/30ML
15 SUSPENSION ORAL EVERY 6 HOURS PRN
Status: DISCONTINUED | OUTPATIENT
Start: 2025-05-26 | End: 2025-05-28 | Stop reason: HOSPADM

## 2025-05-26 RX ORDER — METOPROLOL TARTRATE 1 MG/ML
5 INJECTION, SOLUTION INTRAVENOUS EVERY 6 HOURS PRN
Status: DISCONTINUED | OUTPATIENT
Start: 2025-05-26 | End: 2025-05-28 | Stop reason: HOSPADM

## 2025-05-26 RX ADMIN — DILTIAZEM HYDROCHLORIDE 30 MG: 60 TABLET ORAL at 18:23

## 2025-05-26 RX ADMIN — DILTIAZEM HYDROCHLORIDE 30 MG: 30 TABLET, FILM COATED ORAL at 22:22

## 2025-05-26 RX ADMIN — ACETAMINOPHEN 650 MG: 325 TABLET ORAL at 16:47

## 2025-05-26 RX ADMIN — Medication 10 ML: at 09:21

## 2025-05-26 RX ADMIN — ASPIRIN 81 MG: 81 TABLET, COATED ORAL at 08:03

## 2025-05-26 RX ADMIN — OXYCODONE 5 MG: 5 TABLET ORAL at 08:15

## 2025-05-26 RX ADMIN — HYDROXYZINE HYDROCHLORIDE 10 MG: 10 TABLET ORAL at 14:21

## 2025-05-26 RX ADMIN — ASPIRIN 81 MG: 81 TABLET, COATED ORAL at 22:21

## 2025-05-26 RX ADMIN — LOSARTAN POTASSIUM 100 MG: 100 TABLET, FILM COATED ORAL at 08:03

## 2025-05-26 RX ADMIN — HYDROXYZINE HYDROCHLORIDE 10 MG: 10 TABLET ORAL at 08:03

## 2025-05-26 RX ADMIN — HYDROXYZINE HYDROCHLORIDE 10 MG: 10 TABLET ORAL at 22:22

## 2025-05-26 RX ADMIN — METOPROLOL SUCCINATE 50 MG: 50 TABLET, EXTENDED RELEASE ORAL at 08:03

## 2025-05-26 RX ADMIN — DILTIAZEM HYDROCHLORIDE 30 MG: 60 TABLET ORAL at 12:09

## 2025-05-26 RX ADMIN — SENNOSIDES AND DOCUSATE SODIUM 1 TABLET: 50; 8.6 TABLET ORAL at 08:03

## 2025-05-26 RX ADMIN — ACETAMINOPHEN 650 MG: 325 TABLET ORAL at 22:22

## 2025-05-26 RX ADMIN — ROSUVASTATIN CALCIUM 40 MG: 40 TABLET, FILM COATED ORAL at 22:21

## 2025-05-26 RX ADMIN — ACETAMINOPHEN 650 MG: 325 TABLET ORAL at 10:48

## 2025-05-26 RX ADMIN — OXYCODONE 5 MG: 5 TABLET ORAL at 14:21

## 2025-05-26 RX ADMIN — ACETAMINOPHEN 650 MG: 325 TABLET ORAL at 04:46

## 2025-05-26 RX ADMIN — SENNOSIDES, DOCUSATE SODIUM 1 TABLET: 50; 8.6 TABLET, FILM COATED ORAL at 22:22

## 2025-05-26 ASSESSMENT — ENCOUNTER SYMPTOMS
COUGH: 0
NAUSEA: 0
SHORTNESS OF BREATH: 0
VOMITING: 0
DIARRHEA: 0

## 2025-05-26 ASSESSMENT — PAIN DESCRIPTION - LOCATION
LOCATION: KNEE

## 2025-05-26 ASSESSMENT — PAIN SCALES - GENERAL
PAINLEVEL_OUTOF10: 5
PAINLEVEL_OUTOF10: 4
PAINLEVEL_OUTOF10: 8
PAINLEVEL_OUTOF10: 8
PAINLEVEL_OUTOF10: 5

## 2025-05-26 ASSESSMENT — LIFESTYLE VARIABLES: HOW OFTEN DO YOU HAVE A DRINK CONTAINING ALCOHOL: 2-4 TIMES A MONTH

## 2025-05-26 ASSESSMENT — PAIN DESCRIPTION - ORIENTATION
ORIENTATION: RIGHT

## 2025-05-26 NOTE — PLAN OF CARE
Problem: Discharge Planning  Goal: Discharge to home or other facility with appropriate resources  5/25/2025 2149 by Boris Faria RN  Outcome: Progressing  5/25/2025 1556 by Mita Villegas RN  Outcome: Progressing     Problem: Pain  Goal: Verbalizes/displays adequate comfort level or baseline comfort level  5/25/2025 2149 by Boris Faria RN  Outcome: Progressing  5/25/2025 1556 by Mita Villegas RN  Outcome: Progressing     Problem: Safety - Adult  Goal: Free from fall injury  5/25/2025 2149 by Boris Faria RN  Outcome: Progressing  5/25/2025 1556 by Mita Villegas RN  Outcome: Progressing     Problem: ABCDS Injury Assessment  Goal: Absence of physical injury  5/25/2025 2149 by Boris Faria RN  Outcome: Progressing  5/25/2025 1556 by Mita Villegas, RN  Outcome: Progressing

## 2025-05-26 NOTE — FLOWSHEET NOTE
Per cardiology note patient is cleared for discharge. Rehab expecting patient, secure message sent to hospitalist for discharge/readmit orders. Orders obtained. Report called to nurse receiving patient, per Rehab patient is able to go to room 244. Patient aware of transfer.Electronically signed by Mita Villegas RN on 5/26/2025 at 6:39 PM

## 2025-05-26 NOTE — CARE COORDINATION
Spoke with 1W, patient is not medically cleared to admit to IRF, will continue to monitor. Electronically signed by Nisreen Contreras RN on 5/26/2025 at 12:07 PM

## 2025-05-27 PROBLEM — I47.20 VENTRICULAR TACHYCARDIA (HCC): Status: ACTIVE | Noted: 2025-05-27

## 2025-05-27 PROBLEM — E11.65 HYPERGLYCEMIA DUE TO TYPE 2 DIABETES MELLITUS (HCC): Status: ACTIVE | Noted: 2025-05-27

## 2025-05-27 PROBLEM — T82.7XXA: Status: ACTIVE | Noted: 2025-05-27

## 2025-05-27 PROCEDURE — 6370000000 HC RX 637 (ALT 250 FOR IP): Performed by: NURSE PRACTITIONER

## 2025-05-27 PROCEDURE — 2500000003 HC RX 250 WO HCPCS: Performed by: INTERNAL MEDICINE

## 2025-05-27 PROCEDURE — 6370000000 HC RX 637 (ALT 250 FOR IP): Performed by: PHYSICAL MEDICINE & REHABILITATION

## 2025-05-27 PROCEDURE — 97162 PT EVAL MOD COMPLEX 30 MIN: CPT

## 2025-05-27 PROCEDURE — 97166 OT EVAL MOD COMPLEX 45 MIN: CPT

## 2025-05-27 PROCEDURE — 97110 THERAPEUTIC EXERCISES: CPT

## 2025-05-27 PROCEDURE — 1180000000 HC REHAB R&B

## 2025-05-27 PROCEDURE — 97535 SELF CARE MNGMENT TRAINING: CPT

## 2025-05-27 PROCEDURE — 6360000002 HC RX W HCPCS: Performed by: PHYSICAL MEDICINE & REHABILITATION

## 2025-05-27 PROCEDURE — 6370000000 HC RX 637 (ALT 250 FOR IP): Performed by: INTERNAL MEDICINE

## 2025-05-27 PROCEDURE — 99223 1ST HOSP IP/OBS HIGH 75: CPT | Performed by: PHYSICAL MEDICINE & REHABILITATION

## 2025-05-27 PROCEDURE — 97116 GAIT TRAINING THERAPY: CPT

## 2025-05-27 PROCEDURE — 93005 ELECTROCARDIOGRAM TRACING: CPT | Performed by: INTERNAL MEDICINE

## 2025-05-27 RX ORDER — SODIUM PHOSPHATE, DIBASIC AND SODIUM PHOSPHATE, MONOBASIC 7; 19 G/230ML; G/230ML
1 ENEMA RECTAL DAILY PRN
Status: DISCONTINUED | OUTPATIENT
Start: 2025-05-27 | End: 2025-05-28 | Stop reason: HOSPADM

## 2025-05-27 RX ORDER — ACETAMINOPHEN 325 MG/1
650 TABLET ORAL EVERY 4 HOURS PRN
Status: DISCONTINUED | OUTPATIENT
Start: 2025-05-27 | End: 2025-05-28 | Stop reason: HOSPADM

## 2025-05-27 RX ORDER — MUPIROCIN 20 MG/G
OINTMENT TOPICAL 2 TIMES DAILY
Status: DISCONTINUED | OUTPATIENT
Start: 2025-05-27 | End: 2025-05-28 | Stop reason: HOSPADM

## 2025-05-27 RX ORDER — LOSARTAN POTASSIUM 50 MG/1
50 TABLET ORAL DAILY
Status: DISCONTINUED | OUTPATIENT
Start: 2025-05-28 | End: 2025-05-28 | Stop reason: HOSPADM

## 2025-05-27 RX ORDER — METOPROLOL TARTRATE 1 MG/ML
5 INJECTION, SOLUTION INTRAVENOUS ONCE
Status: COMPLETED | OUTPATIENT
Start: 2025-05-27 | End: 2025-05-27

## 2025-05-27 RX ORDER — BISACODYL 10 MG
10 SUPPOSITORY, RECTAL RECTAL DAILY PRN
Status: DISCONTINUED | OUTPATIENT
Start: 2025-05-27 | End: 2025-05-28 | Stop reason: HOSPADM

## 2025-05-27 RX ORDER — VITAMIN B COMPLEX
2000 TABLET ORAL
Status: DISCONTINUED | OUTPATIENT
Start: 2025-05-27 | End: 2025-05-28 | Stop reason: HOSPADM

## 2025-05-27 RX ORDER — LIDOCAINE 4 G/G
3 PATCH TOPICAL DAILY
Status: DISCONTINUED | OUTPATIENT
Start: 2025-05-27 | End: 2025-05-28 | Stop reason: HOSPADM

## 2025-05-27 RX ORDER — UBIDECARENONE 100 MG
100 CAPSULE ORAL DAILY
Status: DISCONTINUED | OUTPATIENT
Start: 2025-05-27 | End: 2025-05-28 | Stop reason: HOSPADM

## 2025-05-27 RX ORDER — DILTIAZEM HYDROCHLORIDE 120 MG/1
120 CAPSULE, COATED, EXTENDED RELEASE ORAL DAILY
Status: DISCONTINUED | OUTPATIENT
Start: 2025-05-27 | End: 2025-05-28 | Stop reason: HOSPADM

## 2025-05-27 RX ORDER — POVIDONE-IODINE 100 MG/G
OINTMENT TOPICAL 3 TIMES DAILY
Status: DISCONTINUED | OUTPATIENT
Start: 2025-05-27 | End: 2025-05-28 | Stop reason: HOSPADM

## 2025-05-27 RX ORDER — CYANOCOBALAMIN 1000 UG/ML
1000 INJECTION, SOLUTION INTRAMUSCULAR; SUBCUTANEOUS WEEKLY
Status: DISCONTINUED | OUTPATIENT
Start: 2025-05-27 | End: 2025-05-28 | Stop reason: HOSPADM

## 2025-05-27 RX ADMIN — Medication 100 MG: at 08:23

## 2025-05-27 RX ADMIN — POVIDONE-IODINE: 100 OINTMENT TOPICAL at 22:30

## 2025-05-27 RX ADMIN — SENNOSIDES, DOCUSATE SODIUM 1 TABLET: 50; 8.6 TABLET, FILM COATED ORAL at 08:25

## 2025-05-27 RX ADMIN — HYDROXYZINE HYDROCHLORIDE 10 MG: 10 TABLET ORAL at 08:24

## 2025-05-27 RX ADMIN — OXYCODONE 5 MG: 5 TABLET ORAL at 14:05

## 2025-05-27 RX ADMIN — ACETAMINOPHEN 650 MG: 325 TABLET ORAL at 22:44

## 2025-05-27 RX ADMIN — OXYCODONE 5 MG: 5 TABLET ORAL at 22:25

## 2025-05-27 RX ADMIN — DILTIAZEM HYDROCHLORIDE 120 MG: 120 CAPSULE, EXTENDED RELEASE ORAL at 06:23

## 2025-05-27 RX ADMIN — METOPROLOL TARTRATE 5 MG: 5 INJECTION INTRAVENOUS at 05:55

## 2025-05-27 RX ADMIN — LOSARTAN POTASSIUM 100 MG: 100 TABLET, FILM COATED ORAL at 08:25

## 2025-05-27 RX ADMIN — POVIDONE-IODINE: 100 OINTMENT TOPICAL at 12:04

## 2025-05-27 RX ADMIN — ACETAMINOPHEN 650 MG: 325 TABLET ORAL at 12:03

## 2025-05-27 RX ADMIN — MUPIROCIN: 20 OINTMENT TOPICAL at 12:02

## 2025-05-27 RX ADMIN — ASPIRIN 81 MG: 81 TABLET, COATED ORAL at 08:23

## 2025-05-27 RX ADMIN — ROSUVASTATIN CALCIUM 40 MG: 40 TABLET, FILM COATED ORAL at 22:27

## 2025-05-27 RX ADMIN — CYANOCOBALAMIN 1000 MCG: 1000 INJECTION, SOLUTION INTRAMUSCULAR; SUBCUTANEOUS at 08:22

## 2025-05-27 RX ADMIN — MUPIROCIN: 20 OINTMENT TOPICAL at 22:28

## 2025-05-27 RX ADMIN — OXYCODONE 5 MG: 5 TABLET ORAL at 00:38

## 2025-05-27 RX ADMIN — Medication 2000 UNITS: at 17:22

## 2025-05-27 RX ADMIN — OXYCODONE 5 MG: 5 TABLET ORAL at 08:34

## 2025-05-27 RX ADMIN — POVIDONE-IODINE: 100 OINTMENT TOPICAL at 15:39

## 2025-05-27 RX ADMIN — ACETAMINOPHEN 650 MG: 325 TABLET ORAL at 17:22

## 2025-05-27 RX ADMIN — METOPROLOL TARTRATE 5 MG: 5 INJECTION INTRAVENOUS at 06:30

## 2025-05-27 RX ADMIN — METOPROLOL SUCCINATE 50 MG: 50 TABLET, EXTENDED RELEASE ORAL at 08:26

## 2025-05-27 RX ADMIN — ACETAMINOPHEN 650 MG: 325 TABLET ORAL at 05:42

## 2025-05-27 RX ADMIN — ASPIRIN 81 MG: 81 TABLET, COATED ORAL at 22:27

## 2025-05-27 ASSESSMENT — PAIN DESCRIPTION - DESCRIPTORS
DESCRIPTORS: ACHING

## 2025-05-27 ASSESSMENT — PAIN DESCRIPTION - LOCATION
LOCATION: KNEE
LOCATION: KNEE
LOCATION: NECK
LOCATION: LEG
LOCATION: KNEE

## 2025-05-27 ASSESSMENT — PAIN SCALES - GENERAL
PAINLEVEL_OUTOF10: 8
PAINLEVEL_OUTOF10: 8
PAINLEVEL_OUTOF10: 5
PAINLEVEL_OUTOF10: 6
PAINLEVEL_OUTOF10: 7
PAINLEVEL_OUTOF10: 9
PAINLEVEL_OUTOF10: 8

## 2025-05-27 ASSESSMENT — ENCOUNTER SYMPTOMS
BACK PAIN: 1
DIARRHEA: 0
COUGH: 0
VOMITING: 0
STRIDOR: 0
ABDOMINAL PAIN: 0
EYE REDNESS: 0
NAUSEA: 0
EYE PAIN: 0
SHORTNESS OF BREATH: 1
SORE THROAT: 0
CONSTIPATION: 1
WHEEZING: 0
BLOOD IN STOOL: 0
PHOTOPHOBIA: 0

## 2025-05-27 ASSESSMENT — PAIN DESCRIPTION - ORIENTATION
ORIENTATION: RIGHT
ORIENTATION: LEFT
ORIENTATION: RIGHT

## 2025-05-27 ASSESSMENT — PAIN - FUNCTIONAL ASSESSMENT
PAIN_FUNCTIONAL_ASSESSMENT: PREVENTS OR INTERFERES SOME ACTIVE ACTIVITIES AND ADLS
PAIN_FUNCTIONAL_ASSESSMENT: ACTIVITIES ARE NOT PREVENTED

## 2025-05-27 NOTE — CARE COORDINATION
[facility]  INPATIENT REHABILITATION  TEAM CONFERENCE NOTE  Room: R244/R244-01  Admit Date: 2025       Date: 2025  Patient Name: Allen Zambrano        MRN: [id number]    : 1955  (69 y.o.)  Gender: male        REHAB DIAGNOSIS:   Diagnosis: Impaired mobility and ADL's due to s/p RT Total Knee Arthroplasty. Rremoval of Deep implant r/t Osteoarthritis of RT knee. [facility] Rehab Admission 25    CO MORBIDITIES:      Past Medical History:   Diagnosis Date    Hyperlipidemia     Osteoarthritis      Past Surgical History:   Procedure Laterality Date    ANTERIOR CRUCIATE LIGAMENT REPAIR Right     COLONOSCOPY      TOTAL KNEE ARTHROPLASTY Right 2025    RIGHT KNEE TOTAL KNEE  ARTHROPLASTY. REMOVAL OF DEEP IMPLANT performed by Sam Hutchison MD at [facility] OR     Chart Reviewed: Yes  Family/Caregiver Present: No  Diagnosis: Impaired mobility and ADL's due to s/p RT Total Knee Arthroplasty. Rremoval of Deep implant r/t Osteoarthritis of RT knee. [facility] Rehab Admission 25  General  General Comments: Pt up attempting to ambulate indep in room with WW.  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

## 2025-05-27 NOTE — PROGRESS NOTES
Physical Therapy Rehab Treatment Note  Facility/Department: [facility] REHAB  Room: [place] [address]       NAME: Allen Zambrano  : 1955 (69 y.o.)  MRN: [id number]  CODE STATUS: Full Code    Date of Service: 2025       Restrictions:  Restrictions/Precautions: Fall Risk, Weight Bearing  Lower Extremity Weight Bearing Restrictions  Right Lower Extremity Weight Bearing: Weight Bearing As Tolerated  Position Activity Restriction  Other Position/Activity Restrictions: Immobilizer DCd  with +SLR       SUBJECTIVE:   Subjective: \"I need to get back to my life man\"    Pain  Pain: reports 6/10 pain pre/post session- received meds      OBJECTIVE:     Bed Mobility  Overall Assistance Level: Modified Independent  Additional Factors: Verbal cues;Increased time to complete;Head of bed flat;Without handrails  Sit to Supine  Assistance Level: Modified independent  Skilled Clinical Factors: increased pain but no physical assist required  Scooting  Assistance Level: Modified independent  Skilled Clinical Factors: increased pain but no physical assist required    Transfers  Surface: To bed;Wheelchair;To mat;From mat;To chair with arms;From chair with arms  Additional Factors: Verbal cues;Hand placement cues;Increased time to complete  Device: Walker  Sit to Stand  Assistance Level: Stand by assist  Skilled Clinical Factors: occasional cues for hand placement, increased pain between transitions  Stand to Sit  Assistance Level: Stand by assist  Skilled Clinical Factors: cues for hand placement and controlled descent  Car Transfer  Assistance Level: Minimal assistance  Skilled Clinical Factors: vc's for technique and sequencing, visual and verbal demo provided prior to completion, assist at RLE in/out of car to complete    Ambulation  Surface: Level surface  Device: Rolling walker  Distance: 40' x 2  Activity: Within Unit  Additional Factors: Verbal cues;Hand placement cues;Increased time to complete  Gait Deviations: Slow

## 2025-05-27 NOTE — CARE COORDINATION
[facility]  INPATIENT REHABILITATION  INDEPENDENT IN ROOM NOTE  Room: R244/R244-01  Admit Date: 2025       Date: 2025  Patient Name: Allen Zambrano        MRN: [id number]    : 1955  (69 y.o.)  Gender: male      Diagnosis: Impaired mobility and ADL's due to s/p RT Total Knee Arthroplasty. Rremoval of Deep implant r/t Osteoarthritis of RT knee. [facility] Rehab Admission 25         Discipline pre admission summary:    Nursing:  Patient orientation to the room/suite:  [x] Yes    []   No  (Safety checks to consider: Oxygen, Fire Alarm, Stove safety, Call alarm, Bed alarm,   Bed power, TV, Phone)        1. Pt physical ability to be independent in room/suite:  [x] Yes    []   No   Comment:    2. Pt demonstrates cognitive ability to be independent in room/suite:  [x] Yes    []   No   Comment:    3. Pt demonstrates emotional ability to be independent in room/suite:  [x] Yes    []   No   Comment:    4. Special Considerations/Equipment if needed: [x] NA   Comment:    Recommend independent in room/suite:  [x] Yes    []   No            Signature: Electronically signed by Jigna Crawford RN on 2025 at 2:58 PM       Occupational Therapy:  1. Pt physical ability to be independent in room/suite:  [] Yes    []   No   Comment:     2. Pt demonstrates cognitive ability to be independent in room/suite:  [] Yes    []   No   Comment:    3. Pt demonstrates emotional ability to be independent in room/suite:  [] Yes    []   No   Comment:    4. Special Considerations/Equipment if needed: [] NA   Comment:    Recommend independent in room/suite:  [] Yes    []   No       Signature: ***      Physical Therapy:  1. Pt physical ability to be independent in room/suite:  [] Yes    []   No   Comment:     2. Pt demonstrates cognitive ability to be independent in room/suite:  [] Yes    []   No   Comment:    3. Pt demonstrates emotional ability to be independent in room/suite:  [] Yes    []   No   Comment:    4.

## 2025-05-27 NOTE — PROGRESS NOTES
Patient heart rate up to 160's this am-Prn lopressor given and patient rate went down to 80's for a brief time-then back to 150's   perfect serve to Dr. Steward, on call for cariology-order received for stat EKG and ok to give dose of po cardizem early, after ekg is completed.  Patient denies any chest pain during event or any other symptoms other than he can feel his heart racing and slowing down when he is in sr.   Electronically signed by Shima David RN on 5/27/2025 at 6:16 AM

## 2025-05-27 NOTE — CARE COORDINATION
LSW met with pt and dtr, and discussed the discharge date and goals. Pt and dtr are both concerned with the discharge date and voiced they were hoping pt would be here at least until Saturday. Dtr explained that they are working on modifying the home and it will not be done until Saturday. They are also concerned with pt's overall medical complexities relating to cardiology. LSW will notify PMR to determine if the discharge date can be extended to 5/31 instead. Dtr also confirmed that pt's wife has short term memory deficits. Electronically signed by SAL Quiles, LSW on 5/27/2025 at 5:38 PM

## 2025-05-27 NOTE — PROGRESS NOTES
EKG results given to Dr. Steward-and cardizem given.  Order for additional dose of lopressor from Dr. Steward and given-heart rate remaining in 150's-dr cho aware-awaiting response.  Electronically signed by Shima David RN on 5/27/2025 at 6:38 AM

## 2025-05-27 NOTE — H&P
HISTORY & PHYSICAL       DATE OF ADMISSION:  5/26/2025    DATE OF SERVICE:  5/27/25    Subjective:    Allen Zambrano, 69 y.o. male presents today with:     CHIEF COMPLAINT:   69-year-old male with previous right lower extremity injury progressed to osteoarthritis unrelieved with conservative measures requiring right total knee replacement.  Recent postop care complicated by ventricular tachycardia and severe pain.        Date: 05/23/25 Room / Location: [address]     Anesthesia Start: 1208 Anesthesia Stop: 1435     Procedure: RIGHT KNEE TOTAL KNEE  ARTHROPLASTY. REMOVAL OF DEEP IMPLANT (Right) Diagnosis:       Osteoarthritis of right knee, unspecified osteoarthritis type      (Osteoarthritis of right knee, unspecified osteoarthritis type [M17.11])     Surgeons: Sam Hutchison MD Responsible Provider: Jarrod Ortiz DO     Anesthesia Type: spinal, regional ASA Status: 2        Knee Pain   The incident occurred more than 1 week ago. There was no injury mechanism. The pain is present in the right thigh and right knee. The pain is at a severity of 10/10. The pain is severe. The pain has been Fluctuating since onset. Associated symptoms include an inability to bear weight, a loss of motion and muscle weakness. Pertinent negatives include no loss of sensation, numbness or tingling. Possible foreign bodies include metal. The symptoms are aggravated by palpation, movement and weight bearing. He has tried elevation, acetaminophen, ice, immobilization, non-weight bearing and NSAIDs for the symptoms. The treatment provided mild relief.   Fatigue  This is a recurrent problem. The current episode started in the past 7 days. The problem has been gradually improving. Associated symptoms include arthralgias, fatigue, myalgias and weakness. Pertinent negatives include no abdominal pain, chest pain, chills, congestion, coughing, diaphoresis, fever, headaches, nausea, neck pain, numbness,  equal, round, and reactive to light.   Neck:      Thyroid: No thyromegaly.      Vascular: No JVD.      Trachea: No tracheal deviation.   Cardiovascular:      Pulses: No decreased pulses.   Pulmonary:      Effort: Pulmonary effort is normal. No tachypnea, bradypnea, accessory muscle usage or respiratory distress.      Breath sounds: Decreased breath sounds present. No wheezing.   Chest:      Chest wall: No tenderness.   Abdominal:      General: Bowel sounds are normal. There is no distension.      Palpations: Abdomen is soft. There is no mass.      Tenderness: There is no abdominal tenderness. There is no guarding or rebound.   Musculoskeletal:         General: Tenderness present.      Right shoulder: Normal.      Left shoulder: Normal.      Right upper arm: Normal.      Left upper arm: Normal.      Right elbow: Normal.      Left elbow: Normal.      Right forearm: Normal.      Left forearm: Normal.      Right wrist: Normal.      Left wrist: Normal.      Right hand: Normal.      Left hand: Normal.      Cervical back: Normal range of motion and neck supple. Tenderness present. No edema or rigidity.      Thoracic back: Normal.      Lumbar back: Tenderness and bony tenderness present. No swelling, edema, deformity or lacerations. Decreased range of motion.      Right hip: Normal.      Left hip: Normal.      Right upper leg: Normal.      Left upper leg: Normal.      Right knee: Normal.      Left knee: Normal.      Right lower leg: Normal.      Left lower leg: Normal.      Right ankle: Normal.      Right Achilles Tendon: Normal.      Left ankle: Normal.      Left Achilles Tendon: Normal.      Right foot: Normal.      Left foot: Normal.      Comments: Tender areas are indicated by numbered spot         Lymphadenopathy:      Cervical: No cervical adenopathy.   Skin:     General: Skin is warm and dry.      Coloration: Skin is not pale.      Findings: Wound present. No abrasion, bruising, ecchymosis, erythema, laceration,

## 2025-05-27 NOTE — PROGRESS NOTES
Assessment completed. Pt ate well for breakfast. Pt was medicated for pain. Rt knee dressing clean and dry. Pt has a a spot on the left hand were IV was looks and  infected. Dr Cheatham aware.Electronically signed by Jigna Crawford RN on 5/27/2025 at 10:25 AM

## 2025-05-27 NOTE — PROGRESS NOTES
Physical Therapy Rehab Treatment Note  Facility/Department[facility]  [facility]       NAME: Allen Zambrano  : 1955 (69 y.o.)  MRN: [id number]  CODE STATUS: Full Code    Date of Service: 2025     Restrictions:  Restrictions/Precautions: Fall Risk, Weight Bearing    SUBJECTIVE:   Subjective: I want to get as much therapy as i can    Pain  Pain: reports 6/10 pain pre/post session- received meds    OBJECTIVE:     Sit to Supine  Assistance Level: Modified independent  Supine to Sit  Assistance Level: Modified independent  Scooting  Assistance Level: Modified independent    Transfers  Additional Factors: Verbal cues;Hand placement cues;Increased time to complete  Device: Walker  Sit to Stand  Assistance Level: Stand by assist  Skilled Clinical Factors: occasional cues for hand placement, increased pain between transitions  Stand to Sit  Assistance Level: Stand by assist  Skilled Clinical Factors: cues for hand placement and controlled descent  Bed To/From Chair  Technique: Stand step  Assistance Level: Stand by assist    Ambulation  Surface: Level surface  Device: Rolling walker  Distance: 75' x 2  Activity: Within Unit  Activity Comments: Step to antalgic pattern with heavy bracing through BUE during R stance phase  Additional Factors: Verbal cues;Increased time to complete  Assistance Level: Stand by assist  Gait Deviations: Slow godwin;Narrow base of support;Unsteady gait    Stairs  Stair Height: 6''  Device: Bilateral handrails  Number of Stairs: 4  Additional Factors: Verbal cues;Hand placement cues;Non-reciprocal going up;Non-reciprocal going down;Increased time to complete  Assistance Level: Stand by assist    PT Exercises  Exercise Treatment: Supine exercise: Ankle pumps, Quad sets, Heel slides, Hip abduction x 15 (Increased time and effort to complete)     AROM RLE:  Lacking 16 degrees of extension to 95 degrees flexion     ASSESSMENT/PROGRESS TOWARDS GOALS:   Body Structures, Functions,

## 2025-05-27 NOTE — CONSULTS
Hospital Medicine  History and Physical    Patient:  Allen Zambrano  MRN: [id number]    CHIEF COMPLAINT:  No chief complaint on file.      History Obtained From:  Patient, EMR  Primary Care Physician: Eugene Youssef MD    HISTORY OF PRESENT ILLNESS:   The patient is a 69 y.o. male with PMH of hypertension.  Patient had a right total knee replacement.  Subsequently patient developed SVT requiring cardiology intervention.  Meds were adjusted and subsequently patient was admitted to the rehab unit for inpatient physical and Occupational Therapy.  Patient is feeling well.  No chest pain or palpitation.  No abdominal pain, nausea or vomiting.  No headaches, loss of conscious or seizure.    Past Medical History:      Diagnosis Date    Hyperlipidemia     Osteoarthritis        Past Surgical History:      Procedure Laterality Date    ANTERIOR CRUCIATE LIGAMENT REPAIR Right     COLONOSCOPY      TOTAL KNEE ARTHROPLASTY Right 5/23/2025    RIGHT KNEE TOTAL KNEE  ARTHROPLASTY. REMOVAL OF DEEP IMPLANT performed by Sam Hutchison MD at [facility] OR       Medications Prior to Admission:    Prior to Admission medications    Medication Sig Start Date End Date Taking? Authorizing Provider   meloxicam (MOBIC) 7.5 MG tablet Take 1 tablet by mouth daily 5/2/25   Phillip Elmore MD   metoprolol succinate (TOPROL XL) 25 MG extended release tablet Take 2 tablets by mouth daily 5/2/25   Phillip Elmore MD   rosuvastatin (CRESTOR) 40 MG tablet Take 1 tablet by mouth daily 5/2/25   Phillip Elmore MD   losartan (COZAAR) 100 MG tablet Take 1 tablet by mouth daily 5/2/25   Phillip Elmore MD   fexofenadine-pseudoephedrine (ALLEGRA-D 12HR)  MG per extended release tablet Take 1 tablet by mouth in the morning and 1 tablet in the evening.    Phillip Elmore MD   cyclobenzaprine (FLEXERIL) 10 MG tablet Take 1 tablet by mouth 3 times daily  Patient not taking: Reported on 5/23/2025    Phillip Elmore MD

## 2025-05-27 NOTE — CARE COORDINATION
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) and wife will help as able too; has two [place] sheepdogs--has a fenced in yard)  Type of Home: House  Home Layout: One level (Toilet on main level; toilet and shower in basement; 10 steps down without HR--usually uses cane; tub/shower unit on main level nonfunctional at this time but anticipates will be working in a few days once grab bars are in)  Home Access: Stairs to enter without rails  Entrance Stairs - Number of Steps: 3 from front; side door has 1 tall step + 2 steps into home  Bathroom Shower/Tub: Walk-in shower;Doors;Tub/Shower unit;Curtain;Shower chair with back (walk in shower in basement; tub/shower unit on first floor w/ curtain)  Bathroom Toilet: Handicap height  Bathroom Equipment: Shower chair;Grab bars in shower  Bathroom Accessibility:  (cannot fit a ww in doorway)  Home Equipment: Cane;Walker - Rolling (CPAP from home here)  Prior Level of Assist for ADLs: Independent  Prior Level of Assist for Homemaking: Independent (primarily wife, pt does yardwork)  Homemaking Responsibilities: Yes  Meal Prep Responsibility: No (wife completes)  Laundry Responsibility: Secondary (wife completes mostly, pt does his own socks)  Cleaning Responsibility: Secondary (shares w/ spouse)  Bill Paying/Finance Responsibility: Primary (shares w/ spouse--but pt plans to take over due to her memory; all bills are automatic withdraw)  Shopping Responsibility: No (spouse completes)  Health Care Management: Primary (takes straight from the bottle, wife manages her own pill organizer)  Prior Level of Assist for Transfers:

## 2025-05-27 NOTE — PLAN OF CARE
Problem: Discharge Planning  Goal: Discharge to home or other facility with appropriate resources  Outcome: Progressing  Flowsheets  Taken 5/26/2025 2222 by Shima David RN  Discharge to home or other facility with appropriate resources: Identify barriers to discharge with patient and caregiver  Taken 5/26/2025 2204 by Shima David RN  Discharge to home or other facility with appropriate resources:   Identify barriers to discharge with patient and caregiver   Identify discharge learning needs (meds, wound care, etc)   Refer to discharge planning if patient needs post-hospital services based on physician order or complex needs related to functional status, cognitive ability or social support system   Arrange for needed discharge resources and transportation as appropriate   Arrange for interpreters to assist at discharge as needed     Problem: Safety - Adult  Goal: Free from fall injury  Outcome: Progressing     Problem: ABCDS Injury Assessment  Goal: Absence of physical injury  Outcome: Progressing     Problem: Pain  Goal: Verbalizes/displays adequate comfort level or baseline comfort level  Outcome: Progressing  Flowsheets (Taken 5/26/2025 2222 by Shima David RN)  Verbalizes/displays adequate comfort level or baseline comfort level:   Encourage patient to monitor pain and request assistance   Assess pain using appropriate pain scale   Administer analgesics based on type and severity of pain and evaluate response     Problem: Chronic Conditions and Co-morbidities  Goal: Patient's chronic conditions and co-morbidity symptoms are monitored and maintained or improved  Outcome: Progressing     Problem: Skin/Tissue Integrity  Goal: Skin integrity remains intact  Description: 1.  Monitor for areas of redness and/or skin breakdown2.  Assess vascular access sites hourly3.  Every 4-6 hours minimum:  Change oxygen saturation probe site4.  Every 4-6 hours:  If on nasal continuous positive airway pressure,

## 2025-05-27 NOTE — PROGRESS NOTES
INDIVIDUALIZED OVERALL REHAB PLAN OF CARE  ADDENDUM TO REHAB PROGRESS NOTE-for audit purposes must also refer to this day's clinical note and combine the information      Date: 2025  Patient Name: Allen Zambrano   Room: R244/R244-01    MRN: [id number]    : 1955  (69 y.o.)  Gender: male       Today 2025 during weekly team meeting, I reviewed the patient Allen Zambrano in detail with the therapists and nurses involved in patient's care gathering complex physiatric data regarding current medical issues, progress in therapies, factors limiting progress, social issues, psychological issues, ongoing therapeutic plans and discharge planning.  I was present in the PM& R department in [facility].  A  video monitor live feed between the interdisciplinary team participants in the team conference was used to supplement connectivity and facilitate record review and allow immediate access to the EMR to allow real time review of the records and immediately address pertinent issues.   Our program views this an essential process to maintaining the integral rehab physician leadership role in team and the interdisciplinary discussion of our patient.    Legend:  I= independent Im =Modified independent  S=Supervised SB=stand by CURRY=set up CG=contact jose Min= minimal Mod=Moderate Max=maximal Max of 2 =maximal assist of 2 people      CURRENT FUNCTIONAL STATUS:    69-year-old male with previous right lower extremity injury progressed to osteoarthritis unrelieved with conservative measures requiring right total knee replacement.  Recent postop care complicated by ventricular tachycardia and severe pain.        Date: 25 Room / Location: [place] [id number] / [facility]     Anesthesia Start: 1208 Anesthesia Stop: 1435     Procedure: RIGHT KNEE TOTAL KNEE  ARTHROPLASTY. REMOVAL OF DEEP IMPLANT (Right) Diagnosis:       Osteoarthritis of right knee, unspecified osteoarthritis

## 2025-05-27 NOTE — PROGRESS NOTES
DAILY PROGRESS NOTE    Pain consistent with post op course.  No chest pain or shortness of breath.  No calf pain.    Vitals:    25 0145   BP: 111/80   Pulse: (!) 143   Resp:    Temp:    SpO2: 97%      Temp (24hrs), Av °F (37.2 °C), Min:98.8 °F (37.1 °C), Max:99.1 °F (37.3 °C)         Physical Exam:   Dressing  clean, dry and intact  Operative extremity shows neuro vascular status intact. Flexion and extension intact on operative extremity  Calves soft and non-tender without evidence of DVT.  .    Labs reviewed:  CBC:   Recent Labs     25  0530 25  0239   WBC 11.1* 12.5*   HGB 11.9* 11.4*    193     BMP:    Recent Labs     25  0530 25  0239    132*   K 3.9 4.0    103   CO2 22 21   BUN 11 9   CREATININE 0.90 0.75   GLUCOSE 98 129*       Assessment:  Patient status post total knee arthoplasty 2025    Plan:    PT/OT:   DVT proph   Analgesics  Discharge planning   Rapid response called yesterday for elevated HR, givne metoprolol.    Jarret Leyva MD   2025 5:38 AM   
    DAILY PROGRESS NOTE    Pain consistent with post op course.  No chest pain or shortness of breath.  No calf pain.    Vitals:    25 0620   BP:    Pulse:    Resp: 16   Temp:    SpO2:       Temp (24hrs), Av.8 °F (36.6 °C), Min:96.8 °F (36 °C), Max:98.3 °F (36.8 °C)         Physical Exam:   Dressing  clean, dry and intact  Operative extremity shows neuro vascular status intact. Flexion and extension intact on operative extremity  Calves soft and non-tender without evidence of DVT.  .    Labs reviewed:  CBC:   Recent Labs     25  0530   WBC 11.1*   HGB 11.9*        BMP:    Recent Labs     25  0530      K 3.9      CO2 22   BUN 11   CREATININE 0.90   GLUCOSE 98       Assessment:  Patient status post total knee arthoplasty 2025    Plan:    PT/OT:   DVT proph   Analgesics  Discharge planning     Jarret Leyva MD   2025 6:31 AM   
    DAILY PROGRESS NOTE    Yesterday patient found to have SVT.  Cardiology subsequently consulted.    Vitals:    25 0415   BP: (!) 148/78   Pulse: (!) 101   Resp: 18   Temp: 98.2 °F (36.8 °C)   SpO2: 98%      Temp (24hrs), Av.4 °F (36.9 °C), Min:98.2 °F (36.8 °C), Max:98.8 °F (37.1 °C)         Physical Exam:   Dressing  clean, dry and intact  Operative extremity shows neuro vascular status intact. Flexion and extension intact on operative extremity  Calves soft and non-tender without evidence of DVT.  .    Labs reviewed:  CBC:   Recent Labs     25  0530 25  0239   WBC 11.1* 12.5*   HGB 11.9* 11.4*    193     BMP:    Recent Labs     25  0530 25  0239    132*   K 3.9 4.0    103   CO2 22 21   BUN 11 9   CREATININE 0.90 0.75   GLUCOSE 98 129*       Assessment:  Patient status post total knee arthoplasty 2025    Plan:    PT/OT:   DVT proph   Analgesics  Discharge planning   Currently on telemetry      Jarret Leyva MD   2025 5:51 AM   
 69-year-old male with previous right lower extremity injury progressed to osteoarthritis unrelieved with conservative measures requiring right total knee replacement.  Recent postop care complicated by ventricular tachycardia and severe pain.        Date: 05/23/25 Room / Location: [address]     Anesthesia Start: 1208 Anesthesia Stop: 1435     Procedure: RIGHT KNEE TOTAL KNEE  ARTHROPLASTY. REMOVAL OF DEEP IMPLANT (Right) Diagnosis:       Osteoarthritis of right knee, unspecified osteoarthritis type      (Osteoarthritis of right knee, unspecified osteoarthritis type [M17.11])     Surgeons: Sam Hutchison MD Responsible Provider: Jarrod Ortiz DO     Anesthesia Type: spinal, regional ASA Status: 2         Subjective:  The patient complains of severe acute on chronic progressive fatigue and postop right knee pain partially relieved by rest, PT, OT and meds opiates lowest effective dose and exacerbated by exertion and recent  RIGHT KNEE TOTAL KNEE  ARTHROPLASTY. REMOVAL OF DEEP IMPLANT.      I am concerned about patient’s medical complexities including:  Principal Problem:    Status post total knee replacement, right  Active Problems:    Impaired mobility and activities of daily living    Gastroesophageal reflux disease without esophagitis    HTN (hypertension), benign    Obstructive sleep apnea    Post-op pain  Resolved Problems:    * No resolved hospital problems. *      .    Controlled Substance Monitoring:    Acute and Chronic Pain Monitoring:   RX Monitoring Acute Pain Prescriptions Periodic Controlled Substance Monitoring   5/26/2025   8:59 AM Prescription exceeds daily limit for a specific reason. See comments or note.;Severe pain not adequately treated with lower dose.;Not required given exclusionary diagnoses... Possible medication side effects, risk of tolerance/dependence & alternative treatments discussed.;No signs of potential drug abuse or diversion 
Cardiology Follow up Note    Subjective:  Feels much better.  No further palpitations.  Sinus rhythm on telemetry.      Review of Systems:   As noted in the HPI*    Objective:  /69   Pulse 86   Temp 98.2 °F (36.8 °C) (Axillary)   Resp 18   Ht 1.785 m (5' 10.28\")   Wt 99.8 kg (220 lb)   SpO2 100%   BMI 31.32 kg/m²   Vitals stable.   Constitutional: alert, cooperative, in no distress  Eyes: Conjunctiva clear; no scleral icturus. PERRLA   Respiratory: clear to auscultation bilaterally  Musculoskeletal: No chest wall tenderness. No clubbing or cyanosis.   Cardiovascular: regular rate and rhythm, S1, S2 normal, soft systolic murmur. No carotid bruit. No JVD. Pulses 2+ and symmetric.   GI: soft, non-tender, non-distended. Bowel sounds normal. No masses,  no organomegaly  MSK: extremities normal, atraumatic, no clubbing. No chest wall pain.    Neurologic: Cranial nerves grossly intact.  No focal motor and/or sensory deficits.  Skin: No rashes or lesions. No cyanosis.       Intake/Output Summary (Last 24 hours) at 5/26/2025 1556  Last data filed at 5/26/2025 0908  Gross per 24 hour   Intake 360 ml   Output 850 ml   Net -490 ml       Medications:  dilTIAZem, 30 mg, 4 times per day  sodium chloride flush, 5-40 mL, 2 times per day  acetaminophen, 650 mg, Q6H  sennosides-docusate sodium, 1 tablet, BID  aspirin, 81 mg, BID  hydrOXYzine HCl, 10 mg, TID  losartan, 100 mg, Daily  metoprolol succinate, 50 mg, Daily  rosuvastatin, 40 mg, Nightly      sodium chloride, Last Rate: 100 mL/hr at 05/23/25 2259  sodium chloride      Recent Labs     05/24/25  0530 05/25/25  0239   HGB 11.9* 11.4*   WBC 11.1* 12.5*    193    132*   K 3.9 4.0   CO2 22 21   BUN 11 9   MG  --  2.0       Telemetry: Sinus rhythm heart rate 60s to 90      Echo (TTE) complete (PRN contrast/bubble/strain/3D) 05/26/2025  Interpretation Summary    Left Ventricle: Normal left ventricular systolic function with a visually estimated EF of 60 - 
Hospitalist Progress Note      PCP: Eugene Youssef MD    Date of Admission: 5/23/2025    Chief Complaint:    No chief complaint on file.      Subjective:  Patient denies fevers, chills, sweats, CP, SOB, diarrhea or burning micturition.  12 point ROS negative other than mentioned above     Medications:  Reviewed    Infusion Medications    dilTIAZem 125 mg in sodium chloride 0.9 % 125 mL infusion 5 mg/hr (05/25/25 1238)    sodium chloride 100 mL/hr at 05/23/25 2259    sodium chloride       Scheduled Medications    sodium chloride flush  5-40 mL IntraVENous 2 times per day    acetaminophen  650 mg Oral Q6H    sennosides-docusate sodium  1 tablet Oral BID    aspirin  81 mg Oral BID    hydrOXYzine HCl  10 mg Oral TID    losartan  100 mg Oral Daily    metoprolol succinate  50 mg Oral Daily    rosuvastatin  40 mg Oral Nightly     PRN Meds: metoprolol, sodium chloride flush, sodium chloride, oxyCODONE **OR** oxyCODONE, cyclobenzaprine, ondansetron **OR** ondansetron, aluminum & magnesium hydroxide-simethicone    No intake or output data in the 24 hours ending 05/25/25 1407      Exam:    /76   Pulse (!) 165   Temp 98.8 °F (37.1 °C) (Oral)   Resp 16   Ht 1.785 m (5' 10.28\")   Wt 99.8 kg (220 lb)   SpO2 95%   BMI 31.32 kg/m²     General appearance: No apparent distress, appears stated age and cooperative.  HEENT:  Conjunctivae/corneas clear.  Neck: Trachea midline.  Respiratory:  Normal respiratory effort. Clear to auscultation  Cardiovascular: irregularly irregular  Abdomen: Soft, non-tender, non-distended with normal bowel sounds.  Musculoskeletal: No clubbing, cyanosis or edema bilaterally  Neuro: Non Focal.     Labs:   Recent Labs     05/24/25  0530 05/25/25  0239   WBC 11.1* 12.5*   HGB 11.9* 11.4*   HCT 34.0* 33.6*    193     Recent Labs     05/24/25  0530 05/25/25  0239    132*   K 3.9 4.0    103   CO2 22 21   BUN 11 9   CREATININE 0.90 0.75   CALCIUM 8.4* 8.3*     Recent Labs     
Hospitalist Progress Note      PCP: Eugene Youssef MD    Date of Admission: 5/23/2025    Chief Complaint:    No chief complaint on file.      Subjective:  Patient denies fevers, chills, sweats, CP, SOB, diarrhea or burning micturition.  12 point ROS negative other than mentioned above     Medications:  Reviewed    Infusion Medications    sodium chloride 100 mL/hr at 05/23/25 5126    sodium chloride       Scheduled Medications    sodium chloride flush  5-40 mL IntraVENous 2 times per day    acetaminophen  650 mg Oral Q6H    sennosides-docusate sodium  1 tablet Oral BID    aspirin  81 mg Oral BID    hydrOXYzine HCl  10 mg Oral TID    losartan  100 mg Oral Daily    metoprolol succinate  50 mg Oral Daily    rosuvastatin  40 mg Oral Nightly     PRN Meds: sodium chloride flush, sodium chloride, oxyCODONE **OR** oxyCODONE, cyclobenzaprine, ondansetron **OR** ondansetron, aluminum & magnesium hydroxide-simethicone      Intake/Output Summary (Last 24 hours) at 5/24/2025 1421  Last data filed at 5/23/2025 1435  Gross per 24 hour   Intake 1000 ml   Output --   Net 1000 ml       Exam:    /61   Pulse 94   Temp 99 °F (37.2 °C) (Oral)   Resp 18   Ht 1.785 m (5' 10.28\")   Wt 99.8 kg (220 lb)   SpO2 99%   BMI 31.32 kg/m²     General appearance: No apparent distress, appears stated age and cooperative.  HEENT:  Conjunctivae/corneas clear.  Neck: Trachea midline.  Respiratory:  Normal respiratory effort. Clear to auscultation  Cardiovascular: Regular rate and rhythm  Abdomen: Soft, non-tender, non-distended with normal bowel sounds.  Musculoskeletal: No clubbing, cyanosis or edema bilaterally  Neuro: Non Focal.     Labs:   Recent Labs     05/24/25  0530   WBC 11.1*   HGB 11.9*   HCT 34.0*        Recent Labs     05/24/25  0530      K 3.9      CO2 22   BUN 11   CREATININE 0.90   CALCIUM 8.4*     No results for input(s): \"AST\", \"ALT\", \"BILIDIR\", \"BILITOT\", \"ALKPHOS\" in the last 72 hours.  No results for 
[facility] OCCUPATIONAL THERAPY EVALUATION - ACUTE     NAME: Allen Zambrano  : 1955 (69 y.o.)  MRN: [id number]  CODE STATUS: Full Code  Room: 93/W293-01    Date of Service: 2025    Patient Diagnosis(es): Osteoarthritis of right knee, unspecified osteoarthritis type [M17.11]  Status post total knee replacement, right [Z96.651]   Patient Active Problem List    Diagnosis Date Noted    Status post total knee replacement, right 2025      No data found    Past Medical History:   Diagnosis Date    Hyperlipidemia      Past Surgical History:   Procedure Laterality Date    ANTERIOR CRUCIATE LIGAMENT REPAIR Right     COLONOSCOPY          Restrictions  Restrictions/Precautions: Surgical protocol;Fall Risk        Safety Devices:       Patient's date of birth confirmed: Yes    General:  Chart Reviewed: Yes    Subjective        Pain at start of treatment: Yes: 5-6/10    Pain at end of treatment: Yes: 3/10    Location: R knee   Description: stiff sore   Nursing notified: No  Intervention: Repositioned    Prior Level of Function:  Social/Functional History  Lives With: Spouse  Type of Home: House  Home Layout: One level (showers in basement)  Home Access: Stairs to enter with rails  Entrance Stairs - Number of Steps: 2-4 without HRs  Bathroom Shower/Tub: Walk-in shower  Home Equipment: Cane, Walker - Rolling  Has the patient had two or more falls in the past year or any fall with injury in the past year?: No  Prior Level of Assist for ADLs: Independent  Prior Level of Assist for Homemaking: Independent (primarily wife, pt does yardwork)  Prior Level of Assist for Ambulation: Independent household ambulator, with or without device  Prior Level of Assist for Transfers: Independent  Active : Yes  Occupation: Full time employment  Type of Occupation:  direction  Leisure & Hobbies: listening to music, yardwork    OBJECTIVE:     Orientation Status:  Orientation  Overall Orientation Status: Within 
[facility]  Occupational Therapy        NAME:  Allen Zambrano  ROOM: W293/W293-01  :  1955  DATE: 2025    Attempted to see Allen Zambrano at 7:58 on this date for:   [x]  Initial Evaluation   []  Treatment       Patient was unable to be seen due to:   [] Off unit for testing/procedure    [] Patient refused, stating \"    [] Therapy on hold due to     [] Nursing deferred due to    [x] Other:  Rapid response called     Electronically signed by MICHELE Pham/L on 25 at 7:57 AM EDT  
Patient HR elevated throughout this shift NP and MD made aware, Pt received scheduled meds and bolus as ordered and HR remains elevated, Cardiology consulted, will continue to monitor.  
Physical Therapy  Facility/Department: [facility] MED SURG W169/W169-01    NAME: Allen Zambrano    : 1955 (69 y.o.)  MRN: [id number]    Account: [id number]  Gender: male      Pt transferred to 1W today due to elevated HR and requires specific medication that must be provided on 1 W.  No new PT eval required at this time.  Continued with current POC.      Norma Kebede, PT, 25 at 12:16 PM      
Physical Therapy  Facility/Department: [facility] MED SURG W169/W169-01  Physical Therapy Discharge      NAME: Allen Zambrano    : 1955 (69 y.o.)  MRN: [id number]    Account: [id number]  Gender: male      Patient has been discharged from acute care hospital. DC patient from current PT program.      Electronically signed by Norma Kebede PT on 25 at 8:38 AM EDT      
Physical Therapy Med Surg Daily Treatment Note  Facility/Department: [address] TELEMETRY  Room: [place] [id number]       NAME: Allen Zambrano  : 1955 (69 y.o.)  MRN: [id number]  CODE STATUS: Full Code    Date of Service: 2025    Patient Diagnosis(es): Osteoarthritis of right knee, unspecified osteoarthritis type [M17.11]  Status post total knee replacement, right [Z96.651]   No chief complaint on file.    Patient Active Problem List    Diagnosis Date Noted    Status post total knee replacement, right 2025        Past Medical History:   Diagnosis Date    Hyperlipidemia      Past Surgical History:   Procedure Laterality Date    ANTERIOR CRUCIATE LIGAMENT REPAIR Right     COLONOSCOPY         Chart Reviewed: Yes  Family/Caregiver Present: No    Restrictions:  Restrictions/Precautions: Surgical protocol;Fall Risk  Position Activity Restriction  Other Position/Activity Restrictions: D/c when able to straight leg raise.  Send immobilizer home with patient. (+SLR noted )    SUBJECTIVE:   Subjective: \"The knee feels good it's everything around it that hurts.\"    Pain  Pain  Pre-Pain: 5  Post-Pain: 9  Pain Location: Right;Knee  Pain Interventions: Nurse notified;Repositioning;Ice     OBJECTIVE:        Bed mobility  Supine to Sit: Stand by assistance  Sit to Supine: Stand by assistance  Bed Mobility Comments: increased time and effort to complete, pt able to lift RLE  without assistance this session.    Transfers  Sit to Stand: Stand by assistance  Stand to Sit: Stand by assistance  Comment: vc's for improved body mechanics and anterior weight shifting.    Ambulation  Surface: Level tile  Device: Rolling Walker  Assistance: Stand by assistance  Quality of Gait: reciprocal step through pattern once cued, antalgic.  Distance: 70'    Stairs/Curb  Stairs?: Yes  Stairs  Curbs: 6\"  Device: Rolling walker  Assistance: Stand by assistance  Comment: vc's for sequencing AD and BLE, pt reports stairs to enter home without 
Physical Therapy Med Surg Daily Treatment Note  Facility/Department: [address] TELEMETRY  Room: [place] [id number]       NAME: Allen Zambrano  : 1955 (69 y.o.)  MRN: [id number]  CODE STATUS: Full Code    Date of Service: 2025    Patient Diagnosis(es): Osteoarthritis of right knee, unspecified osteoarthritis type [M17.11]  Status post total knee replacement, right [Z96.651]   No chief complaint on file.    Patient Active Problem List    Diagnosis Date Noted    Status post total knee replacement, right 2025        Past Medical History:   Diagnosis Date    Hyperlipidemia      Past Surgical History:   Procedure Laterality Date    ANTERIOR CRUCIATE LIGAMENT REPAIR Right     COLONOSCOPY         Chart Reviewed: Yes    Restrictions:  Restrictions/Precautions: Surgical protocol;Fall Risk  Position Activity Restriction  Other Position/Activity Restrictions: D/c when able to straight leg raise.  Send immobilizer home with patient. (+SLR noted )    SUBJECTIVE:   Subjective: Patient transferred to  due to increased HR and needing a new IV med given on Telemetry floor.    Pain  Pain  Pre-Pain: 5  Post-Pain: 8  Pain Location: Right;Knee  Pain Descriptor: Sore  Pain Interventions: Nurse notified;Repositioning;Ice    OBJECTIVE:        Bed mobility  Supine to Sit: Stand by assistance  Sit to Supine: Minimal assistance  Bed Mobility Comments: Min A to lift Rt LE into bed    Transfers  Sit to Stand: Stand by assistance  Stand to Sit: Stand by assistance  Comment: VCs to improve proximity to chair prior to sitting    Ambulation  Surface: Level tile  Device: Rolling Walker  Assistance: Stand by assistance  Quality of Gait: Step to pattern, unable to progress due to pain., NBOS  Gait Deviations: Slow Henna;Decreased step length;Decreased step height  Distance: 35ft  Comments: pt declines further tx d/t pain.                   PT Exercises  Exercise Treatment: Standing weightshifts x10  A/AROM Exercises: Supine AAROM SLR 
Physical Therapy Med Surg Daily Treatment Note  Facility/Department: [address] TELEMETRY  Room: [place] [id number]       NAME: Allen Zambrano  : 1955 (69 y.o.)  MRN: [id number]  CODE STATUS: Full Code    Date of Service: 2025    Patient Diagnosis(es): Osteoarthritis of right knee, unspecified osteoarthritis type [M17.11]  Status post total knee replacement, right [Z96.651]   No chief complaint on file.    Patient Active Problem List    Diagnosis Date Noted    Impaired mobility and activities of daily living 2025    Post-op pain 2025    Status post total knee replacement, right 2025    Primary osteoarthritis of right knee 2024    Achilles tendinitis of left lower extremity 2023    Dyslipidemia 2023    Gastroesophageal reflux disease without esophagitis 2023    Obstructive sleep apnea 2023    Paroxysmal SVT (supraventricular tachycardia) 2023    Seasonal allergies 2023    Erectile dysfunction 2023    HTN (hypertension), benign 2023        Past Medical History:   Diagnosis Date    Hyperlipidemia     Osteoarthritis      Past Surgical History:   Procedure Laterality Date    ANTERIOR CRUCIATE LIGAMENT REPAIR Right     COLONOSCOPY      TOTAL KNEE ARTHROPLASTY Right 2025    RIGHT KNEE TOTAL KNEE  ARTHROPLASTY. REMOVAL OF DEEP IMPLANT performed by Sam Hutchison MD at [facility] OR       Chart Reviewed: Yes  Family/Caregiver Present: Yes    Restrictions:  Restrictions/Precautions: Surgical protocol;Fall Risk  Position Activity Restriction  Other Position/Activity Restrictions: D/c when able to straight leg raise.  Send immobilizer home with patient. (+SLR noted )    SUBJECTIVE:   Subjective: \"I have been trying to lift this leg.\"    Pain  Pain  Pre-Pain: 8  Post-Pain: 8  Pain Location: Right;Knee  Pain Interventions: Nurse notified;Repositioning;Ice     OBJECTIVE:        Bed mobility  Supine to Sit: Supervision  Sit to Supine: Supervision  Bed 
Physical Therapy Med Surg Daily Treatment Note  Facility/Department: [address] ORTHO TELE  Room: [place] [id number]       NAME: Allen Zambrano  : 1955 (69 y.o.)  MRN: [id number]  CODE STATUS: Full Code    Date of Service: 2025    Patient Diagnosis(es): Osteoarthritis of right knee, unspecified osteoarthritis type [M17.11]  Status post total knee replacement, right [Z96.651]   No chief complaint on file.    Patient Active Problem List    Diagnosis Date Noted    Status post total knee replacement, right 2025        Past Medical History:   Diagnosis Date    Hyperlipidemia      Past Surgical History:   Procedure Laterality Date    ANTERIOR CRUCIATE LIGAMENT REPAIR Right     COLONOSCOPY         Chart Reviewed: Yes    Restrictions:  Restrictions/Precautions: Surgical protocol;Fall Risk  Position Activity Restriction  Other Position/Activity Restrictions: D/c when able to straight leg raise.  Send immobilizer home with patient. (+SLR noted )    SUBJECTIVE:   Subjective: Patient reports not sleeping well last night. Unsure if he received pain meds this AM.    Pain  Pain  Pre-Pain: 5  Post-Pain: 5  Pain Location: Right;Knee  Pain Descriptor: Sore  Pain Interventions: Ice;Nurse notified    OBJECTIVE:        Bed mobility  Supine to Sit: Stand by assistance  Bed Mobility Comments: Increased time to complete    Transfers  Sit to Stand: Stand by assistance  Stand to Sit: Stand by assistance  Comment: VCs to improve proximity to chair prior to sitting    Ambulation  Surface: Level tile  Device: Rolling Walker  Assistance: Stand by assistance  Quality of Gait: Step to pattern, unable to progress due to pain.  Gait Deviations: Slow Henna;Decreased step length;Decreased step height  Distance: 25ft  Comments: pt declines further tx d/t pain.                   PT Exercises  A/AROM Exercises: Supine SLR x5, QS x10, Hip IR/ER x10 Rt           ASSESSMENT   Body Structures, Functions, Activity Limitations Requiring Skilled 
Physical Therapy Med Surg Initial Assessment  Facility/Department: [address] ORTHO TELE  Room: [facility]       NAME: Allen Zambrano  : 1955 (69 y.o.)  MRN: [id number]  CODE STATUS: Full Code    Date of Service: 2025    Patient Diagnosis(es): Osteoarthritis of right knee, unspecified osteoarthritis type [M17.11]  Status post total knee replacement, right [Z96.651]   No chief complaint on file.    Patient Active Problem List    Diagnosis Date Noted    Status post total knee replacement, right 2025        Past Medical History:   Diagnosis Date    Hyperlipidemia      Past Surgical History:   Procedure Laterality Date    ANTERIOR CRUCIATE LIGAMENT REPAIR Right     COLONOSCOPY              Restrictions:  Restrictions/Precautions: Surgical protocol;Fall Risk     SUBJECTIVE:   Subjective: Pt s/p Rt TKR. resting comfortably and eager to get out of bed. Pt had rapid response this am for elevated HR. RN states ok to see.    Pain      Pre Pain:  Pain Rating (0-10 pain scale):   3/10   Location: Rt knee   Description: stiff, sore  [x] Agreeable for treatment      POST-PAIN:  Pain Rating (0-10 pain scale):   5-6/10 with amb, 3/10 at rest  Location and pain description same as pre-treatment unless indicated.   Action: [] NA   [x]  RN notified          Prior Level of Function:  Social/Functional History  Lives With: Spouse  Type of Home: House  Home Layout: One level (showers in basement)  Home Access: Stairs to enter with rails  Entrance Stairs - Number of Steps: 2-4 without HRs  Bathroom Shower/Tub: Walk-in shower  Home Equipment: Cane, Walker - Rolling  Has the patient had two or more falls in the past year or any fall with injury in the past year?: No  Prior Level of Assist for ADLs: Independent  Prior Level of Assist for Homemaking: Independent (primarily wife, pt does yardwork)  Prior Level of Assist for Ambulation: Independent household ambulator, with or without device  Prior Level of Assist for 
Progress Note  Date:2025       Room:69/W169-01  Patient Name:Allen Zambrano     YOB: 1955     Age:69 y.o.        Subjective    Subjective:  Symptoms:  No shortness of breath, malaise, cough, chest pain, weakness, headache, chest pressure, anorexia, diarrhea or anxiety.    Diet:  No nausea or vomiting.       Review of Systems   Respiratory:  Negative for cough and shortness of breath.    Cardiovascular:  Negative for chest pain.   Gastrointestinal:  Negative for anorexia, diarrhea, nausea and vomiting.   Neurological:  Negative for weakness.     Objective         Vitals Last 24 Hours:  TEMPERATURE:  Temp  Av.4 °F (36.9 °C)  Min: 98.2 °F (36.8 °C)  Max: 98.8 °F (37.1 °C)  RESPIRATIONS RANGE: Resp  Av.5  Min: 16  Max: 18  PULSE OXIMETRY RANGE: SpO2  Av.3 %  Min: 95 %  Max: 100 %  PULSE RANGE: Pulse  Av.3  Min: 94  Max: 165  BLOOD PRESSURE RANGE: Systolic (24hrs), Av , Min:112 , Max:153   ; Diastolic (24hrs), Av, Min:67, Max:82    I/O (24Hr):    Intake/Output Summary (Last 24 hours) at 2025 1028  Last data filed at 2025 0908  Gross per 24 hour   Intake 600 ml   Output 850 ml   Net -250 ml     Objective:  General Appearance:  Comfortable, well-appearing and in no acute distress.    Vital signs: (most recent): Blood pressure 124/67, pulse (!) 107, temperature 98.2 °F (36.8 °C), resp. rate 18, height 1.785 m (5' 10.28\"), weight 99.8 kg (220 lb), SpO2 98%.    HEENT: Normal HEENT exam.    Lungs:  He is in respiratory distress.    Heart: S1 normal and S2 normal.    Abdomen: Abdomen is soft.  Bowel sounds are normal.     Pulses: Distal pulses are intact.    Neurological: Patient is alert.    Pupils:  Pupils are equal, round, and reactive to light.    Skin:  Warm.      Labs/Imaging/Diagnostics    Labs:  CBC:  Recent Labs     25  0530 25  0239   WBC 11.1* 12.5*   RBC 3.75* 3.68*   HGB 11.9* 11.4*   HCT 34.0* 33.6*   MCV 90.7 91.3   RDW 12.2 12.2    
Pt's HR sustained in the 160's. Dr. Hansen made aware-orders received to obtain EKG.  EGK obtained. Dr. Hansen rounding on pt.    IV Adenosine administered by cardiac nurse-see MAR. Pt converted back to SVT. Decision made to transfer pt to  for IV Cardizem vs Amiodarone drip. Pt made aware and agreeable. Pt notified his family he is being transferred to cardiac floor Bed 169.    Electronically signed by SONALI TRAORE RN on 5/25/25 at 11:03 AM EDT        
Rapid response called for elevated heart rate.  Patient states he has a known history of elevated heart rate and has been evaluated extensively by cardiology in the past, his mother also had similar issue.  Both he and his mother have been on metoprolol.  Patient did not receive metoprolol due to borderline low blood pressure this morning.    At time of evaluation, systolic 98, heart rate 179.  5 mg IV metoprolol given, heart rate came down into 140s, systolic 105, continuing to improve.  Patient comfortable on room air not in distress talking in full sentences, in good spirits.  On follow-up with bedside RN, heart rate had improved to less than 100.  Advised to give p.o. metoprolol and continue to monitor.    NICKOLAS MONTANO MD    
See OT evaluation for all goals and OT POC. Electronically signed by Carlos Grande, OTR/L on 5/24/2025 at 1:10 PM   
Will do discharge/readmit as per Ortho.  
written HEP for supine/seated therex, instructed on technique dosage and frequency of all exercises, pt verbalizes understanding.  A/AROM Exercises: supine AP/QS/GS/SLR/Hip abd/HS x10 RLE. seated LAQ/HF x 10 RLE.          Activity Tolerance  Activity Tolerance: Patient tolerated treatment well          ASSESSMENT   Assessment: monitored pt's HR throughout session, highest reading 100 bpm. pt able to increase gait distance and overall requires less assistance for mobility compared to AM eval.     Discharge Recommendations:  Continue to assess pending progress         Goals  Short Term Goals  Short Term Goal 1: independent bed mob  Short Term Goal 2: Independent transfers  Long Term Goals  Long Term Goal 1: ambulate 150 ft with safest AD with supervision  Long Term Goal 2: Improve strength and balance to safely achieve all goals  Patient Goals   Patient Goals : return home, go to rehab    PLAN    General Plan: 2 times a day 7 days a week  Safety Devices  Type of Devices: Bed alarm in place, Call light within reach, Left in bed, Nurse notified     AMPA (6 CLICK) BASIC MOBILITY  AM-PAC Inpatient Mobility Raw Score : 17      Therapy Time   Individual   Time In 1404   Time Out 1427   Minutes 23     Gait: 10  Therex: 8  BM/Trsf: 5        Marty Padilla PTA, 05/24/25 at 2:52 PM         Definitions for assistance levels  Independent = pt does not require any physical supervision or assistance from another person for activity completion. Device may be needed.  Stand by assistance = pt requires verbal cues or instructions from another person, close to but not touching, to perform the activity  Minimal assistance= pt performs 75% or more of the activity; assistance is required to complete the activity  Moderate assistance= pt performs 50% of the activity; assistance is required to complete the activity  Maximal assistance = pt performs 25% of the activity; assistance is required to complete the activity  Dependent = pt 
Collection Time: 05/25/25  2:39 AM   Result Value Ref Range    Magnesium 2.0 1.7 - 2.4 mg/dL   CBC with Auto Differential    Collection Time: 05/25/25  2:39 AM   Result Value Ref Range    WBC 12.5 (H) 4.8 - 10.8 K/uL    RBC 3.68 (L) 4.70 - 6.10 M/uL    Hemoglobin 11.4 (L) 14.0 - 18.0 g/dL    Hematocrit 33.6 (L) 42.0 - 52.0 %    MCV 91.3 79.0 - 92.2 fL    MCH 31.0 27.0 - 31.3 pg    MCHC 33.9 33.0 - 37.0 %    RDW 12.2 11.5 - 14.5 %    Platelets 193 130 - 400 K/uL    PLATELET SLIDE REVIEW Adequate     SLIDE REVIEW see below     Neutrophils % 85.0 %    Lymphocytes % 8.0 %    Monocytes % 4.8 %    Eosinophils % 2.0 %    Basophils % 0.5 %    Neutrophils Absolute 10.6 (H) 1.4 - 6.5 K/uL    Lymphocytes Absolute 1.1 1.0 - 4.8 K/uL    Monocytes Absolute 0.6 0.2 - 0.8 K/uL    Eosinophils Absolute 0.3 0.0 - 0.7 K/uL    Basophils Absolute 0.0 0.0 - 0.2 K/uL    Atypical Lymphocytes Relative 1 %    Smudge Cells 1.0     Anisocytosis 1+     Poikilocytes 1+    Comprehensive Metabolic Panel w/ Reflex to MG    Collection Time: 05/25/25  2:39 AM   Result Value Ref Range    Sodium 132 (L) 135 - 144 mEq/L    Potassium reflex Magnesium 4.0 3.4 - 4.9 mEq/L    Chloride 103 95 - 107 mEq/L    CO2 21 20 - 31 mEq/L    Anion Gap 8 (L) 9 - 15 mEq/L    Glucose 129 (H) 70 - 99 mg/dL    BUN 9 8 - 23 mg/dL    Creatinine 0.75 0.70 - 1.20 mg/dL    Est, Glom Filt Rate >90.0 >60    Calcium 8.3 (L) 8.5 - 9.9 mg/dL    Total Protein 6.0 (L) 6.3 - 8.0 g/dL    Albumin 3.4 (L) 3.5 - 4.6 g/dL    Total Bilirubin 0.5 0.2 - 0.7 mg/dL    Alkaline Phosphatase 49 35 - 104 U/L    ALT 7 0 - 41 U/L    AST 13 0 - 40 U/L    Globulin 2.6 2.3 - 3.5 g/dL   EKG 12 Lead    Collection Time: 05/25/25 10:02 AM   Result Value Ref Range    Ventricular Rate 157 BPM    Atrial Rate 66 BPM    QRS Duration 104 ms    Q-T Interval 288 ms    QTc Calculation (Bazett) 465 ms    R Axis -49 degrees    T Axis 118 degrees    Diagnosis       Supraventricular tachycardia  Incomplete right bundle

## 2025-05-27 NOTE — PROGRESS NOTES
[place] [facility]  Rehabilitation Initial Assessment: Physical Therapy  Room: R244R244-01    NAME: Allen Zambrano  : 1955  MRN: [id number]    Date of Service: 2025    Rehab Diagnosis(es): Impaired mobility and ADL's due to s/p RT Total Knee Arthroplasty. Rremoval of Deep implant r/t Osteoarthritis of RT knee. [facility]  Patient Active Problem List    Diagnosis Date Noted    Hyperglycemia due to type 2 diabetes mellitus (HCC) 2025    Ventricular tachycardia (HCC) 2025    IV site infection 2025    Impaired mobility ADLs dt RTKA 2025    Post-op pain 2025    Status post total knee replacement, right 2025    Primary osteoarthritis of right knee 2024    Achilles tendinitis of left lower extremity 2023    Dyslipidemia 2023    Gastroesophageal reflux disease without esophagitis 2023    Obstructive sleep apnea 2023    Paroxysmal SVT (supraventricular tachycardia) 2023    Seasonal allergies 2023    Erectile dysfunction 2023    HTN (hypertension), benign 2023       Past Medical History:   Diagnosis Date    Hyperlipidemia     Osteoarthritis      Past Surgical History:   Procedure Laterality Date    ANTERIOR CRUCIATE LIGAMENT REPAIR Right     COLONOSCOPY      TOTAL KNEE ARTHROPLASTY Right 2025    RIGHT KNEE TOTAL KNEE  ARTHROPLASTY. REMOVAL OF DEEP IMPLANT performed by Sam Hutchison [place] [facility]       Chart Reviewed: Yes  Family/Caregiver Present: No  Diagnosis: Impaired mobility and ADL's due to s/p RT Total Knee Arthroplasty. Rremoval of Deep implant r/t Osteoarthritis of RT knee. [facility]  General  General Comments: Pt up attempting to ambulate indep in room with WW.    Restrictions:  Restrictions/Precautions: Fall Risk, Weight Bearing  Lower Extremity Weight Bearing Restrictions  Right Lower Extremity Weight Bearing: Weight Bearing As Tolerated  Position

## 2025-05-28 ENCOUNTER — HOSPITAL ENCOUNTER (INPATIENT)
Age: 70
LOS: 3 days | Discharge: HOME | End: 2025-05-31
Attending: INTERNAL MEDICINE | Admitting: STUDENT IN AN ORGANIZED HEALTH CARE EDUCATION/TRAINING PROGRAM
Payer: MEDICARE

## 2025-05-28 VITALS
SYSTOLIC BLOOD PRESSURE: 132 MMHG | TEMPERATURE: 98.2 F | DIASTOLIC BLOOD PRESSURE: 85 MMHG | BODY MASS INDEX: 33.49 KG/M2 | HEART RATE: 155 BPM | WEIGHT: 233.91 LBS | OXYGEN SATURATION: 96 % | HEIGHT: 70 IN | RESPIRATION RATE: 18 BRPM

## 2025-05-28 DIAGNOSIS — Z96.651 STATUS POST TOTAL KNEE REPLACEMENT, RIGHT: Primary | ICD-10-CM

## 2025-05-28 DIAGNOSIS — G89.18 POST-OP PAIN: ICD-10-CM

## 2025-05-28 LAB
EKG ATRIAL RATE: 150 BPM
EKG ATRIAL RATE: 66 BPM
EKG ATRIAL RATE: 91 BPM
EKG DIAGNOSIS: NORMAL
EKG P AXIS: 72 DEGREES
EKG P-R INTERVAL: 158 MS
EKG Q-T INTERVAL: 286 MS
EKG Q-T INTERVAL: 288 MS
EKG Q-T INTERVAL: 376 MS
EKG QRS DURATION: 100 MS
EKG QRS DURATION: 104 MS
EKG QRS DURATION: 120 MS
EKG QTC CALCULATION (BAZETT): 460 MS
EKG QTC CALCULATION (BAZETT): 462 MS
EKG QTC CALCULATION (BAZETT): 465 MS
EKG R AXIS: -39 DEGREES
EKG R AXIS: -49 DEGREES
EKG R AXIS: -58 DEGREES
EKG T AXIS: 118 DEGREES
EKG T AXIS: 165 DEGREES
EKG T AXIS: 75 DEGREES
EKG VENTRICULAR RATE: 156 BPM
EKG VENTRICULAR RATE: 157 BPM
EKG VENTRICULAR RATE: 91 BPM
GLUCOSE BLD-MCNC: 172 MG/DL (ref 70–99)
PERFORMED ON: ABNORMAL

## 2025-05-28 PROCEDURE — 6370000000 HC RX 637 (ALT 250 FOR IP): Performed by: STUDENT IN AN ORGANIZED HEALTH CARE EDUCATION/TRAINING PROGRAM

## 2025-05-28 PROCEDURE — 6360000002 HC RX W HCPCS: Performed by: STUDENT IN AN ORGANIZED HEALTH CARE EDUCATION/TRAINING PROGRAM

## 2025-05-28 PROCEDURE — 99233 SBSQ HOSP IP/OBS HIGH 50: CPT | Performed by: PHYSICAL MEDICINE & REHABILITATION

## 2025-05-28 PROCEDURE — 6370000000 HC RX 637 (ALT 250 FOR IP): Performed by: PHYSICAL MEDICINE & REHABILITATION

## 2025-05-28 PROCEDURE — 99223 1ST HOSP IP/OBS HIGH 75: CPT | Performed by: INTERNAL MEDICINE

## 2025-05-28 PROCEDURE — 6370000000 HC RX 637 (ALT 250 FOR IP): Performed by: NURSE PRACTITIONER

## 2025-05-28 PROCEDURE — 2500000003 HC RX 250 WO HCPCS: Performed by: STUDENT IN AN ORGANIZED HEALTH CARE EDUCATION/TRAINING PROGRAM

## 2025-05-28 PROCEDURE — 6370000000 HC RX 637 (ALT 250 FOR IP): Performed by: INTERNAL MEDICINE

## 2025-05-28 PROCEDURE — 97530 THERAPEUTIC ACTIVITIES: CPT

## 2025-05-28 PROCEDURE — 2060000000 HC ICU INTERMEDIATE R&B

## 2025-05-28 PROCEDURE — 97110 THERAPEUTIC EXERCISES: CPT

## 2025-05-28 PROCEDURE — 93010 ELECTROCARDIOGRAM REPORT: CPT | Performed by: INTERNAL MEDICINE

## 2025-05-28 PROCEDURE — 2500000003 HC RX 250 WO HCPCS: Performed by: INTERNAL MEDICINE

## 2025-05-28 PROCEDURE — 93005 ELECTROCARDIOGRAM TRACING: CPT | Performed by: INTERNAL MEDICINE

## 2025-05-28 PROCEDURE — 5A09357 ASSISTANCE WITH RESPIRATORY VENTILATION, LESS THAN 24 CONSECUTIVE HOURS, CONTINUOUS POSITIVE AIRWAY PRESSURE: ICD-10-PCS | Performed by: STUDENT IN AN ORGANIZED HEALTH CARE EDUCATION/TRAINING PROGRAM

## 2025-05-28 PROCEDURE — 97535 SELF CARE MNGMENT TRAINING: CPT

## 2025-05-28 PROCEDURE — 2580000003 HC RX 258: Performed by: INTERNAL MEDICINE

## 2025-05-28 PROCEDURE — 94150 VITAL CAPACITY TEST: CPT

## 2025-05-28 PROCEDURE — 97116 GAIT TRAINING THERAPY: CPT

## 2025-05-28 RX ORDER — LOSARTAN POTASSIUM 50 MG/1
50 TABLET ORAL DAILY
Status: DISCONTINUED | OUTPATIENT
Start: 2025-05-29 | End: 2025-05-31 | Stop reason: HOSPADM

## 2025-05-28 RX ORDER — POVIDONE-IODINE 100 MG/G
OINTMENT TOPICAL 3 TIMES DAILY
Status: DISCONTINUED | OUTPATIENT
Start: 2025-05-28 | End: 2025-05-31 | Stop reason: HOSPADM

## 2025-05-28 RX ORDER — ACETAMINOPHEN 325 MG/1
650 TABLET ORAL EVERY 6 HOURS
Status: DISCONTINUED | OUTPATIENT
Start: 2025-05-28 | End: 2025-05-31 | Stop reason: HOSPADM

## 2025-05-28 RX ORDER — ENOXAPARIN SODIUM 100 MG/ML
30 INJECTION SUBCUTANEOUS 2 TIMES DAILY
Status: DISCONTINUED | OUTPATIENT
Start: 2025-05-28 | End: 2025-05-31 | Stop reason: HOSPADM

## 2025-05-28 RX ORDER — ROSUVASTATIN CALCIUM 40 MG/1
40 TABLET, COATED ORAL NIGHTLY
Status: DISCONTINUED | OUTPATIENT
Start: 2025-05-28 | End: 2025-05-31 | Stop reason: HOSPADM

## 2025-05-28 RX ORDER — BISACODYL 10 MG
10 SUPPOSITORY, RECTAL RECTAL DAILY PRN
Status: DISCONTINUED | OUTPATIENT
Start: 2025-05-28 | End: 2025-05-31 | Stop reason: HOSPADM

## 2025-05-28 RX ORDER — METOPROLOL TARTRATE 1 MG/ML
5 INJECTION, SOLUTION INTRAVENOUS EVERY 6 HOURS PRN
Status: DISCONTINUED | OUTPATIENT
Start: 2025-05-28 | End: 2025-05-31 | Stop reason: HOSPADM

## 2025-05-28 RX ORDER — LIDOCAINE 4 G/G
3 PATCH TOPICAL DAILY
Status: DISCONTINUED | OUTPATIENT
Start: 2025-05-28 | End: 2025-05-31 | Stop reason: HOSPADM

## 2025-05-28 RX ORDER — SOTALOL HYDROCHLORIDE 80 MG/1
80 TABLET ORAL 2 TIMES DAILY
Status: DISCONTINUED | OUTPATIENT
Start: 2025-05-28 | End: 2025-05-31 | Stop reason: HOSPADM

## 2025-05-28 RX ORDER — MAGNESIUM HYDROXIDE/ALUMINUM HYDROXICE/SIMETHICONE 120; 1200; 1200 MG/30ML; MG/30ML; MG/30ML
15 SUSPENSION ORAL EVERY 6 HOURS PRN
Status: DISCONTINUED | OUTPATIENT
Start: 2025-05-28 | End: 2025-05-31 | Stop reason: HOSPADM

## 2025-05-28 RX ORDER — SOTALOL HYDROCHLORIDE 80 MG/1
80 TABLET ORAL 2 TIMES DAILY
Status: DISCONTINUED | OUTPATIENT
Start: 2025-05-28 | End: 2025-05-28

## 2025-05-28 RX ORDER — DILTIAZEM HYDROCHLORIDE 120 MG/1
120 CAPSULE, COATED, EXTENDED RELEASE ORAL DAILY
Status: DISCONTINUED | OUTPATIENT
Start: 2025-05-29 | End: 2025-05-30

## 2025-05-28 RX ORDER — ADENOSINE 3 MG/ML
INJECTION, SOLUTION INTRAVENOUS
Status: COMPLETED | OUTPATIENT
Start: 2025-05-28 | End: 2025-05-28

## 2025-05-28 RX ORDER — CYCLOBENZAPRINE HCL 10 MG
10 TABLET ORAL 3 TIMES DAILY PRN
Status: DISCONTINUED | OUTPATIENT
Start: 2025-05-28 | End: 2025-05-31 | Stop reason: HOSPADM

## 2025-05-28 RX ORDER — SODIUM PHOSPHATE, DIBASIC AND SODIUM PHOSPHATE, MONOBASIC 7; 19 G/230ML; G/230ML
1 ENEMA RECTAL DAILY PRN
Status: DISCONTINUED | OUTPATIENT
Start: 2025-05-28 | End: 2025-05-31 | Stop reason: HOSPADM

## 2025-05-28 RX ORDER — OXYCODONE HYDROCHLORIDE 5 MG/1
2.5 TABLET ORAL EVERY 4 HOURS PRN
Refills: 0 | Status: DISCONTINUED | OUTPATIENT
Start: 2025-05-28 | End: 2025-05-31 | Stop reason: HOSPADM

## 2025-05-28 RX ORDER — SOTALOL HYDROCHLORIDE 80 MG/1
80 TABLET ORAL 2 TIMES DAILY
Status: DISCONTINUED | OUTPATIENT
Start: 2025-05-28 | End: 2025-05-28 | Stop reason: HOSPADM

## 2025-05-28 RX ORDER — ASPIRIN 81 MG/1
81 TABLET ORAL 2 TIMES DAILY
Status: DISCONTINUED | OUTPATIENT
Start: 2025-05-28 | End: 2025-05-31 | Stop reason: HOSPADM

## 2025-05-28 RX ORDER — DILTIAZEM HYDROCHLORIDE 5 MG/ML
10 INJECTION INTRAVENOUS ONCE
Status: DISCONTINUED | OUTPATIENT
Start: 2025-05-28 | End: 2025-05-28 | Stop reason: HOSPADM

## 2025-05-28 RX ORDER — OXYCODONE HYDROCHLORIDE 5 MG/1
5 TABLET ORAL EVERY 4 HOURS PRN
Refills: 0 | Status: DISCONTINUED | OUTPATIENT
Start: 2025-05-28 | End: 2025-05-31 | Stop reason: HOSPADM

## 2025-05-28 RX ORDER — CHOLECALCIFEROL (VITAMIN D3) 50 MCG
2000 TABLET ORAL
Qty: 60 TABLET | Refills: 5 | Status: SHIPPED | OUTPATIENT
Start: 2025-05-28

## 2025-05-28 RX ORDER — MUPIROCIN 20 MG/G
OINTMENT TOPICAL 2 TIMES DAILY
Status: DISCONTINUED | OUTPATIENT
Start: 2025-05-28 | End: 2025-05-31 | Stop reason: HOSPADM

## 2025-05-28 RX ORDER — VITAMIN B COMPLEX
2000 TABLET ORAL
Status: DISCONTINUED | OUTPATIENT
Start: 2025-05-29 | End: 2025-05-31 | Stop reason: HOSPADM

## 2025-05-28 RX ORDER — METOPROLOL TARTRATE 1 MG/ML
INJECTION, SOLUTION INTRAVENOUS
Status: COMPLETED | OUTPATIENT
Start: 2025-05-28 | End: 2025-05-28

## 2025-05-28 RX ORDER — SENNA AND DOCUSATE SODIUM 50; 8.6 MG/1; MG/1
1 TABLET, FILM COATED ORAL 2 TIMES DAILY
Status: DISCONTINUED | OUTPATIENT
Start: 2025-05-28 | End: 2025-05-31 | Stop reason: HOSPADM

## 2025-05-28 RX ORDER — ACETAMINOPHEN 325 MG/1
650 TABLET ORAL EVERY 4 HOURS PRN
Status: DISCONTINUED | OUTPATIENT
Start: 2025-05-28 | End: 2025-05-31 | Stop reason: HOSPADM

## 2025-05-28 RX ORDER — UBIDECARENONE 100 MG
100 CAPSULE ORAL DAILY
Status: DISCONTINUED | OUTPATIENT
Start: 2025-05-29 | End: 2025-05-31 | Stop reason: HOSPADM

## 2025-05-28 RX ORDER — CYANOCOBALAMIN 1000 UG/ML
1000 INJECTION, SOLUTION INTRAMUSCULAR; SUBCUTANEOUS WEEKLY
Status: DISCONTINUED | OUTPATIENT
Start: 2025-06-03 | End: 2025-05-31 | Stop reason: HOSPADM

## 2025-05-28 RX ORDER — OXYCODONE HYDROCHLORIDE 5 MG/1
5 TABLET ORAL EVERY 4 HOURS PRN
Qty: 30 TABLET | Refills: 0 | Status: ON HOLD | OUTPATIENT
Start: 2025-05-28 | End: 2025-05-31

## 2025-05-28 RX ORDER — DILTIAZEM HYDROCHLORIDE 5 MG/ML
10 INJECTION INTRAVENOUS ONCE
Status: COMPLETED | OUTPATIENT
Start: 2025-05-28 | End: 2025-05-28

## 2025-05-28 RX ADMIN — ACETAMINOPHEN 650 MG: 325 TABLET ORAL at 16:36

## 2025-05-28 RX ADMIN — MUPIROCIN: 20 OINTMENT TOPICAL at 10:51

## 2025-05-28 RX ADMIN — ACETAMINOPHEN 650 MG: 325 TABLET ORAL at 10:48

## 2025-05-28 RX ADMIN — ASPIRIN 81 MG: 81 TABLET, COATED ORAL at 08:40

## 2025-05-28 RX ADMIN — CYCLOBENZAPRINE 10 MG: 10 TABLET, FILM COATED ORAL at 20:25

## 2025-05-28 RX ADMIN — Medication 2000 UNITS: at 16:36

## 2025-05-28 RX ADMIN — DILTIAZEM HYDROCHLORIDE 120 MG: 120 CAPSULE, EXTENDED RELEASE ORAL at 08:40

## 2025-05-28 RX ADMIN — Medication 100 MG: at 08:37

## 2025-05-28 RX ADMIN — ADENOSINE 12 MG: 3 INJECTION, SOLUTION INTRAVENOUS at 19:01

## 2025-05-28 RX ADMIN — CYCLOBENZAPRINE HYDROCHLORIDE 10 MG: 10 TABLET, FILM COATED ORAL at 08:50

## 2025-05-28 RX ADMIN — METOPROLOL SUCCINATE 50 MG: 50 TABLET, EXTENDED RELEASE ORAL at 08:40

## 2025-05-28 RX ADMIN — LOSARTAN POTASSIUM 50 MG: 50 TABLET, FILM COATED ORAL at 08:40

## 2025-05-28 RX ADMIN — ASPIRIN 81 MG: 81 TABLET, COATED ORAL at 20:25

## 2025-05-28 RX ADMIN — ENOXAPARIN SODIUM 30 MG: 100 INJECTION SUBCUTANEOUS at 22:22

## 2025-05-28 RX ADMIN — SOTALOL HYDROCHLORIDE 80 MG: 80 TABLET ORAL at 20:25

## 2025-05-28 RX ADMIN — OXYCODONE 5 MG: 5 TABLET ORAL at 20:24

## 2025-05-28 RX ADMIN — ADENOSINE 6 MG: 3 INJECTION, SOLUTION INTRAVENOUS at 18:58

## 2025-05-28 RX ADMIN — DILTIAZEM HYDROCHLORIDE 10 MG: 5 INJECTION INTRAVENOUS at 19:28

## 2025-05-28 RX ADMIN — ROSUVASTATIN CALCIUM 40 MG: 40 TABLET, FILM COATED ORAL at 20:25

## 2025-05-28 RX ADMIN — ACETAMINOPHEN 650 MG: 325 TABLET ORAL at 06:27

## 2025-05-28 RX ADMIN — ACETAMINOPHEN 650 MG: 325 TABLET ORAL at 22:10

## 2025-05-28 RX ADMIN — METOPROLOL TARTRATE 5 MG: 1 INJECTION, SOLUTION INTRAVENOUS at 18:56

## 2025-05-28 RX ADMIN — OXYCODONE 5 MG: 5 TABLET ORAL at 08:49

## 2025-05-28 RX ADMIN — POVIDONE-IODINE: 100 OINTMENT TOPICAL at 10:51

## 2025-05-28 RX ADMIN — DILTIAZEM HYDROCHLORIDE 2.5 MG/HR: 5 INJECTION INTRAVENOUS at 19:28

## 2025-05-28 RX ADMIN — MUPIROCIN: 20 OINTMENT TOPICAL at 22:10

## 2025-05-28 ASSESSMENT — PAIN SCALES - GENERAL
PAINLEVEL_OUTOF10: 4
PAINLEVEL_OUTOF10: 8
PAINLEVEL_OUTOF10: 4
PAINLEVEL_OUTOF10: 4
PAINLEVEL_OUTOF10: 5
PAINLEVEL_OUTOF10: 8
PAINLEVEL_OUTOF10: 4
PAINLEVEL_OUTOF10: 3
PAINLEVEL_OUTOF10: 5

## 2025-05-28 ASSESSMENT — PAIN DESCRIPTION - DESCRIPTORS
DESCRIPTORS: SHARP;ACHING;DULL
DESCRIPTORS: ACHING

## 2025-05-28 ASSESSMENT — PAIN DESCRIPTION - ORIENTATION
ORIENTATION: RIGHT

## 2025-05-28 ASSESSMENT — PAIN - FUNCTIONAL ASSESSMENT
PAIN_FUNCTIONAL_ASSESSMENT: ACTIVITIES ARE NOT PREVENTED
PAIN_FUNCTIONAL_ASSESSMENT: PREVENTS OR INTERFERES SOME ACTIVE ACTIVITIES AND ADLS

## 2025-05-28 ASSESSMENT — PAIN DESCRIPTION - LOCATION
LOCATION: LEG
LOCATION: LEG
LOCATION: KNEE

## 2025-05-28 NOTE — CARE COORDINATION
Message left for Dr Cheatham per LSW request in regards to discharge date change due to patent home not being safe at discharge. Family to prepare home by 5/31 which is the requested discharge date of the family. Message left and awaiting respond prior to updating therapy of change. Electronically signed by Nisreen Contreras RN on 5/28/2025 at 10:03 AM

## 2025-05-28 NOTE — FLOWSHEET NOTE
0932: Patient assessments completed, patient alert ans oriented times 4, able to make all needs known, Aquacel dressing intact to right knee, no shadowing noted. Patient IV removed. Patient pain controlled with PRN medications. Complies with all therapies. Call light in reach, will continue to monitor throughout this shift. .Electronically signed by Chelle Hannon RN on 5/28/2025 at 9:36 AM  1023: Patient 's from 0959 to 1001, perfect serve sent to Dr Hansen, pending any new orders, therapy is held at this time, NSR 90 at this time on monitor. .Electronically signed by Chelle Hannon RN on 5/28/2025 at 10:23 AM  1130: EKG completed per Dr Kenney orders.   1721: Dr Hansen in to see patient, new orders obtained, start Sotalol, discontinued Metoprolol, daily EKG at 0600. Patient is aware of all new orders. Will continue to monitor throughout this shift. .Electronically signed by Chelle Hannon RN on 5/28/2025 at 5:23 PM

## 2025-05-28 NOTE — PLAN OF CARE
Problem: Discharge Planning  Goal: Discharge to home or other facility with appropriate resources  Outcome: Progressing     Problem: Safety - Adult  Goal: Free from fall injury  Outcome: Progressing     Problem: ABCDS Injury Assessment  Goal: Absence of physical injury  Outcome: Progressing     Problem: Pain  Goal: Verbalizes/displays adequate comfort level or baseline comfort level  Outcome: Progressing     Problem: Chronic Conditions and Co-morbidities  Goal: Patient's chronic conditions and co-morbidity symptoms are monitored and maintained or improved  Outcome: Progressing     Problem: Skin/Tissue Integrity  Goal: Skin integrity remains intact  Description: 1.  Monitor for areas of redness and/or skin breakdown2.  Assess vascular access sites hourly3.  Every 4-6 hours minimum:  Change oxygen saturation probe site4.  Every 4-6 hours:  If on nasal continuous positive airway pressure, respiratory therapy assess nares and determine need for appliance change or resting period  Outcome: Progressing

## 2025-05-28 NOTE — PROGRESS NOTES
Physical Therapy Rehab Treatment Note  Facility/Department: [facility] [place] [facility]  Room: [place] [address]       NAME: Allen Zambrano  : 1955 (69 y.o.)  MRN: [id number]  CODE STATUS: Full Code    Date of Service: 2025     Restrictions:  Restrictions/Precautions: Fall Risk, Weight Bearing. Limited to in bed treatment per RN    SUBJECTIVE:   Subjective: \" I may have over did it\"    Pain  Pain: 5/10 R knee achy pain. Medicated prior to session    OBJECTIVE:     Roll Left  Assistance Level: Independent  Roll Right  Assistance Level: Independent  Sit to Supine  Assistance Level: Independent      PT Exercises  Exercise Treatment: Supine exercise: Ankle pumps, Quad sets, Heel slides, Hip abduction x 15      Activity Tolerance  Activity Tolerance: Patient limited by pain    ASSESSMENT/PROGRESS TOWARDS GOALS:   Assessment  Assessment: Patient limited to in bed therapy at this time per RN. Patient completes tolling and supine exercise. Increased time to complete supine exercise due to L knee pain  Activity Tolerance: Patient limited by pain  Discharge Recommendations: Home with Home health PT;Home independently;Outpatient PT    Goals:  Short Term Goals  Short Term Goal 1: Pt to complete HEP with indep  Long Term Goals  Long Term Goal 1: Pt to complete bed mobility with indep  Long Term Goal 2: Pt to complete transfers with indep  Long Term Goal 3: Pt to ambulate 150ft with LRD and indep  Long Term Goal 4: Pt to manage 10 steps with HR and indep  Long Term Goal 5: Pt to achieve AROM R knee 10-110degrees  Patient Goals   Patient Goals : Get home.    PLAN OF CARE/Safety:   Safety Devices  Type of Devices: All fall risk precautions in place;Chair alarm in place    Therapy Time:   Individual   Time In 1445   Time Out 1515   Minutes 30      Missed 30 minutes due to patient limited to in bed therapy/ increased pain    Minutes: 30  Transfer/Bed mobility training: 10  Therapeutic ex: 20    Jarrod White PTA, 25 at 4:22 PM

## 2025-05-28 NOTE — PROGRESS NOTES
OCCUPATIONAL THERAPY  INPATIENT REHAB TREATMENT NOTE  [facility]      NAME: Allen Zambrano  : 1955 (69 y.o.)  MRN: [id number]  CODE STATUS: Full Code  Room: R244/R244-01    Date of Service: 2025    Referring Physician: Dr Cheatham  Rehab Diagnosis: Impaired mobility and ADL's due to s/p R Total Knee Arthroplasty.  Removal of deep implant due to osteoarthritis of Right Knee    Hospital course:   Comments: 69 y.o. male had an ACL reconstruction done at least 30 years ago. He has since developed significant osteoarthritis. Patient has treated conservatively over the years, but is no longer providing relief. Patient elected to proceed with a RT Total Knee Replacement with removal of hardware. This was performed on 25.      Restrictions  Restrictions/Precautions  Restrictions/Precautions: Fall Risk, Weight Bearing   Lower Extremity Weight Bearing Restrictions  Right Lower Extremity Weight Bearing: Weight Bearing As Tolerated             Patient's date of birth confirmed: Yes    SAFETY:  Safety Devices  Safety Devices in place: Yes  Type of devices: All fall risk precautions in place    SUBJECTIVE:       Pain at start of treatment: No    Pain at end of treatment: No    COGNITION:  Orientation  Overall Orientation Status: Within Functional Limits  Orientation Level: Oriented X4  Cognition  Overall Cognitive Status: WFL      OBJECTIVE:     Pt completed BUE strengthening seated in w/c with sup to task.  Pt completed shoulder flex/ext, horiz ab/ad (RUE discontinued d/t pain-see note statement below in this section), chest presses, bicep curls, wrist flex/ext and forearm sup/pronation 10 repsx1 set each without # wt.  Modified ex on RUE for shoulder prn d/t shoulder pain completing 5 x 2 sets and discontinuing post attempt of modification of reps and ROM for horiz ab/ad.  Pt completed task to increase strength and functional act tolerance in order to improve with transfers, mobility and ADL

## 2025-05-28 NOTE — PROGRESS NOTES
[facility]  Occupational Therapy      NAME:  Allen Zambrano  ROOM: R244/R244-01  :  1955  DATE: 2025    Attempted to see Allen Zambrano on this date at 1100 for a 60 minute session for   []  Initial Evaluation   [x]  Scheduled therapy    []  Make-up therapy   []  Group therapy   []  Family training    Patient was unable to be seen due to:   [] Off unit for testing/procedure   [] Patient refused, stating \"    [x] Therapy on hold per nursing due to high HR. RN waiting to hear from physician for plan of treatment    [] Nursing deferred due to    [] Other:      Discussed with NORMA Corbett    Electronically signed by MICHELE Eli/L on 25 at 12:24 PM EDT

## 2025-05-28 NOTE — PROGRESS NOTES
Physical Therapy Rehab Treatment Note  [facility]  [facility]       NAME: Allen Zambrano  : 1955 (69 y.o.)  MRN: [id number]  CODE STATUS: Full Code    Date of Service: 2025     Restrictions:  Restrictions/Precautions: Fall Risk, Weight Bearing    SUBJECTIVE:   Subjective: \" I may have over did it\"    Pain  Pain: 5/10 R knee achy pain. Medicated prior to session    OBJECTIVE:     Roll Left  Assistance Level: Independent  Roll Right  Assistance Level: Independent  Sit to Supine  Assistance Level: Independent  Supine to Sit  Assistance Level: Independent  Scooting  Assistance Level: Independent    Sit to Stand  Assistance Level: Supervision;Modified independent  Stand to Sit  Assistance Level: Supervision;Modified independent  Bed To/From Chair  Technique: Stand step  Assistance Level: Supervision;Modified independent    Ambulation  Surface: Level surface  Device: Rolling walker  Distance: 150'  Activity: Within Unit  Activity Comments: Step to antalgic pattern with heavy bracing through BUE during R stance phase  Assistance Level: Supervision;Modified independent  Skilled Clinical Factors: Good safety slow pace    Stairs  Stair Height: 6''  Device: One handrail;Cane  Number of Stairs: 12  Additional Factors: Verbal cues;Hand placement cues;Non-reciprocal going up;Non-reciprocal going down;Increased time to complete  Assistance Level: Supervision  Skilled Clinical Factors: Improved sequencing Increased time to complete    PT Exercises  Exercise Treatment: Supine exercise: Ankle pumps, Quad sets, Heel slides, Hip abduction x 15 Seated exercise: LAQ's, Hip flexion, Hip abduction, HS curls RTB x 15. (Ice pack applied to R knee during seated exercise)     ASSESSMENT/PROGRESS TOWARDS GOALS:   Assessment  Assessment: Patient showing good safety with gait transfers and  stair negotiation. Seated exercise performed with ice pack to right knee to reduce pain. At end of session RN present  in gym takes patient back to room due to elevated HR  Activity Tolerance: Patient tolerated treatment well  Discharge Recommendations: Home with Home health PT;Home independently;Outpatient PT    Goals:  Short Term Goals  Short Term Goal 1: Pt to complete HEP with indep  Long Term Goals  Long Term Goal 1: Pt to complete bed mobility with indep  Long Term Goal 2: Pt to complete transfers with indep  Long Term Goal 3: Pt to ambulate 150ft with LRD and indep  Long Term Goal 4: Pt to manage 10 steps with HR and indep  Long Term Goal 5: Pt to achieve AROM R knee 10-110degrees  Patient Goals   Patient Goals : Get home.    PLAN OF CARE/Safety:   Safety Devices  Type of Devices: All fall risk precautions in place;Chair alarm in place    Therapy Time:   Individual   Time In 0900   Time Out 1000   Minutes 60      Minutes: 60  Transfer/Bed mobility trainin  Gait trainin  Therapeutic ex: 15    Jarrod White PTA, 25 at 12:09 PM

## 2025-05-28 NOTE — PROGRESS NOTES
Patient had another episode of SVT managed by cardiology.  Patient is feeling well at this time.    Patient is sitting in bed.  No distress.  Chest is clear, heart is regular.  Abdomen soft    *Status post right knee arthroplasty  All surgical related issues including but not limited to pain mgt, pharmacological DVT prophylaxis ( Ortho recommended ASA BID )  monitoring wound healing, potenital post op complications, ambulation instructions and out pt follow up are to be addressed by surgical team.  Please communicate with the surgical team if you have any question or issue related to surgery or complication of surgery.     *Functional impairment.  Please refer to physical and Occupational Therapy team notes for details on her functional status and treatment plan.  Ambulation instructions, restrictions, follow-up precautions are to be addressed by physiatrist.     *Paroxysmal SVT.  Patient was seen by cardiologist who recommended a combination of beta and calcium blocker.  No anticoagulation.  Defer further needed diagnostic and therapeutic intervention related to his cardiovascular disease to cardiology team     *Anemia.  No evidence of acute or massive blood loss.  Pt will likley required to have anemia w/u that could be done in out pt setting by PCP in collaboration with other needed out pt providers.  This may include but not limited to needing to have EGD, colonoscopy, Age appropriate cancer screening, hematological investiogation and others specific to pt situation and desire to proceed.     *Hypertension  Blood pressure is soft after the initiation of calcium blocker  Reduced ARB dose to allow for blocking agents to take effect.     *Multiple other medical issues not listed above.  To be addressed when time and condition are appropriate.  Could be addressed electively postdischarge from skilled care to be handled by PCP in collaboration with needed outpatient providers     Pharmacological DVT prophylaxis is to

## 2025-05-28 NOTE — SIGNIFICANT EVENT
Rapid called out of concern for SVT. HR 170s on my arrival. He was normotensive with systolic . He was mentating appropriately and noted some palpitations but no chest pain or dizziness. 5 mg IV lopressor given with brief improvement but after ~10 seconds rhythm went back to SVT. Adenosine 6 mg and 12 mg were given with no improvement. Decision was made to admit to 1W with Cardizem gtt. Notified his consulting cardiologist Dr. Hansen who recommended we bolus with 10 mg IV Cardizem and give sotalol early this evening. I relayed this to pharmacist and RN. CCT: 30 min

## 2025-05-28 NOTE — PROGRESS NOTES
[facility]  Occupational Therapy      NAME:  Allen Hobsony  ROOM: R244/R244-01  :  1955  DATE: 2025    Attempted to see Allen Zambrano on this date at 1445 for    []  Initial Evaluation   []  Scheduled therapy    [x]  Make-up therapy   []  Group therapy   []  Family training    Patient was unable to be seen due to:   [] Off unit for testing/procedure   [] Patient refused, stating \"    [] Therapy on hold due to     [] Nursing deferred due to    [x] Other:  Patient with PT      Electronically signed by MICHELE Eli/L on 25 at 3:02 PM EDT

## 2025-05-28 NOTE — CONSULTS
[place], [place]    CARDIOLOGY CONSULTATION REPORT    DATE OF CONSULTATION  5/28/2025    CONSULTANT  Emory Hansen DO     REQUESTING PHYSICIAN  Norma Cheatham DO     PRIMARY CARDIOLOGIST  Dr. De La Rosa - [facility]     REASON FOR CONSULTATION  SVT    HISTORY OF PRESENT ILLNESS  Allen Zambrano is a 69 y.o. male with history of paroxysmal SVT/atrial tachycardia, hypertension, hyperlipidemia who [facility].  While hospitalized he was having episodes of PSVT requiring IV Cardizem subsequent changed to oral Cardizem as well as beta-blocker.  We are consulted for recurrences of PSVT during rehab.  He had an episode today lasting about 2 minutes associated with palpitations.  Heart rate up to 180s.  No associated shortness of breath, lightheadedness, dizziness, near-syncope, or syncope.  He has been getting his medications.      Allergies  No Known Allergies    Medications   dilTIAZem, 120 mg, Daily  Vitamin D, 2,000 Units, Dinner  cyanocobalamin, 1,000 mcg, Weekly  coenzyme Q10, 100 mg, Daily  lidocaine, 3 patch, Daily  povidone-iodine, , TID  mupirocin, , BID  losartan, 50 mg, Daily  acetaminophen, 650 mg, Q6H  sennosides-docusate sodium, 1 tablet, BID  aspirin, 81 mg, BID  metoprolol succinate, 50 mg, Daily  rosuvastatin, 40 mg, Nightly        Past Medical History:   Diagnosis Date    Hyperlipidemia     Osteoarthritis       Past Surgical History:   Procedure Laterality Date    ANTERIOR CRUCIATE LIGAMENT REPAIR Right     COLONOSCOPY      TOTAL KNEE ARTHROPLASTY Right 5/23/2025    RIGHT KNEE TOTAL KNEE  ARTHROPLASTY. REMOVAL OF DEEP IMPLANT [place] [facility] [place]      Social History     Tobacco Use    Smoking status: Former     Types: Cigarettes    Smokeless tobacco: Former   Substance Use Topics    Alcohol use: Yes     Comment: socially      History reviewed. No pertinent family history.     Review of Systems  As noted in

## 2025-05-28 NOTE — PLAN OF CARE
Problem: Discharge Planning  Goal: Discharge to home or other facility with appropriate resources  5/28/2025 0931 by Chelle Hannon RN  Outcome: Progressing  5/28/2025 0316 by Monisha Gray RN  Outcome: Progressing     Problem: Safety - Adult  Goal: Free from fall injury  5/28/2025 0931 by Chelle Hannon RN  Outcome: Progressing  5/28/2025 0316 by Monisha Gray RN  Outcome: Progressing     Problem: ABCDS Injury Assessment  Goal: Absence of physical injury  5/28/2025 0931 by Chelle Hannon RN  Outcome: Progressing  5/28/2025 0316 by Monisha Gray RN  Outcome: Progressing     Problem: Pain  Goal: Verbalizes/displays adequate comfort level or baseline comfort level  5/28/2025 0931 by Chelle Hannon RN  Outcome: Progressing  5/28/2025 0316 by Monisha Gray RN  Outcome: Progressing     Problem: Chronic Conditions and Co-morbidities  Goal: Patient's chronic conditions and co-morbidity symptoms are monitored and maintained or improved  5/28/2025 0931 by Chelle Hannon RN  Outcome: Progressing  5/28/2025 0316 by Monisha Gray RN  Outcome: Progressing     Problem: Skin/Tissue Integrity  Goal: Skin integrity remains intact  Description: 1.  Monitor for areas of redness and/or skin breakdown2.  Assess vascular access sites hourly3.  Every 4-6 hours minimum:  Change oxygen saturation probe site4.  Every 4-6 hours:  If on nasal continuous positive airway pressure, respiratory therapy assess nares and determine need for appliance change or resting period  5/28/2025 0931 by Chelle Hannon RN  Outcome: Progressing  5/28/2025 0316 by Monisha Gray RN  Outcome: Progressing

## 2025-05-28 NOTE — PROGRESS NOTES
69-year-old male with previous right lower extremity injury progressed to osteoarthritis unrelieved with conservative measures requiring right total knee replacement.  Recent postop care complicated by ventricular tachycardia and severe pain.        Date: 05/23/25 Room / Location: [address]     Anesthesia Start: 1208 Anesthesia Stop: 1435     Procedure: RIGHT KNEE TOTAL KNEE  ARTHROPLASTY. REMOVAL OF DEEP IMPLANT (Right) Diagnosis:       Osteoarthritis of right knee, unspecified osteoarthritis type      (Osteoarthritis of right knee, unspecified osteoarthritis type [M17.11])     Surgeons: Sam Hutchison MD Responsible Provider: Jarrod Ortiz DO     Anesthesia Type: spinal, regional          Subjective:  The patient complains of severe acute on chronic progressive fatigue and postop pain right knee partially relieved by rest, medications, PT,  OT,     and rest and exacerbated by recent revision of right total knee replaced.      I am concerned about patient’s medical complexities and barriers to advancing in rehab goals including completing cardiac workup regarding postop tachycardia.        I reviewed current care and plans for further care with other rehab providers including nursing and case management.  According to recent   note, \" met with pt and dtr, and discussed the discharge date and goals. Pt and dtr are both concerned with the discharge date and voiced they were hoping pt would be here at least until Saturday. Dtr explained that they are working on modifying the home and it will not be done until Saturday. They are also concerned with pt's overall medical complexities relating to cardiology. LSW will notify PMR to determine if the discharge date can be extended to 5/31 instead. Dtr also confirmed that pt's wife has short term memory deficits  \".    ROS x10:  The patient also complains of severely impaired mobility and activities of daily living.  Otherwise no

## 2025-05-29 PROBLEM — I47.10 SUPRAVENTRICULAR TACHYCARDIA: Status: ACTIVE | Noted: 2025-05-29

## 2025-05-29 LAB
ANION GAP SERPL CALCULATED.3IONS-SCNC: 10 MEQ/L (ref 9–15)
ANISOCYTOSIS BLD QL SMEAR: ABNORMAL
BASOPHILS # BLD: 0.1 K/UL (ref 0–0.2)
BASOPHILS NFR BLD: 1 %
BUN SERPL-MCNC: 9 MG/DL (ref 8–23)
CALCIUM SERPL-MCNC: 8.9 MG/DL (ref 8.5–9.9)
CHLORIDE SERPL-SCNC: 102 MEQ/L (ref 95–107)
CO2 SERPL-SCNC: 27 MEQ/L (ref 20–31)
CREAT SERPL-MCNC: 0.72 MG/DL (ref 0.7–1.2)
EKG ATRIAL RATE: 78 BPM
EKG DIAGNOSIS: NORMAL
EKG P AXIS: 37 DEGREES
EKG P-R INTERVAL: 166 MS
EKG Q-T INTERVAL: 382 MS
EKG QRS DURATION: 118 MS
EKG QTC CALCULATION (BAZETT): 435 MS
EKG R AXIS: -53 DEGREES
EKG T AXIS: 56 DEGREES
EKG VENTRICULAR RATE: 78 BPM
EOSINOPHIL # BLD: 0.7 K/UL (ref 0–0.7)
EOSINOPHIL NFR BLD: 7 %
ERYTHROCYTE [DISTWIDTH] IN BLOOD BY AUTOMATED COUNT: 11.9 % (ref 11.5–14.5)
GLUCOSE SERPL-MCNC: 106 MG/DL (ref 70–99)
HCT VFR BLD AUTO: 30.3 % (ref 42–52)
HGB BLD-MCNC: 10.7 G/DL (ref 14–18)
LYMPHOCYTES # BLD: 1.7 K/UL (ref 1–4.8)
LYMPHOCYTES NFR BLD: 15 %
MCH RBC QN AUTO: 31.8 PG (ref 27–31.3)
MCHC RBC AUTO-ENTMCNC: 35.3 % (ref 33–37)
MCV RBC AUTO: 90.2 FL (ref 79–92.2)
MONOCYTES # BLD: 0.9 K/UL (ref 0.2–0.8)
MONOCYTES NFR BLD: 8.5 %
NEUTROPHILS # BLD: 7.1 K/UL (ref 1.4–6.5)
NEUTS SEG NFR BLD: 68 %
PLATELET # BLD AUTO: 389 K/UL (ref 130–400)
PLATELET BLD QL SMEAR: ADEQUATE
POIKILOCYTOSIS BLD QL SMEAR: ABNORMAL
POTASSIUM SERPL-SCNC: 4.2 MEQ/L (ref 3.4–4.9)
RBC # BLD AUTO: 3.36 M/UL (ref 4.7–6.1)
SMUDGE CELLS BLD QL SMEAR: 1.9
SODIUM SERPL-SCNC: 139 MEQ/L (ref 135–144)
VARIANT LYMPHS NFR BLD: 1 %
WBC # BLD AUTO: 10.5 K/UL (ref 4.8–10.8)

## 2025-05-29 PROCEDURE — 99223 1ST HOSP IP/OBS HIGH 75: CPT | Performed by: INTERNAL MEDICINE

## 2025-05-29 PROCEDURE — 85025 COMPLETE CBC W/AUTO DIFF WBC: CPT

## 2025-05-29 PROCEDURE — 93005 ELECTROCARDIOGRAM TRACING: CPT | Performed by: STUDENT IN AN ORGANIZED HEALTH CARE EDUCATION/TRAINING PROGRAM

## 2025-05-29 PROCEDURE — 80048 BASIC METABOLIC PNL TOTAL CA: CPT

## 2025-05-29 PROCEDURE — 6360000002 HC RX W HCPCS: Performed by: STUDENT IN AN ORGANIZED HEALTH CARE EDUCATION/TRAINING PROGRAM

## 2025-05-29 PROCEDURE — 97116 GAIT TRAINING THERAPY: CPT

## 2025-05-29 PROCEDURE — 36415 COLL VENOUS BLD VENIPUNCTURE: CPT

## 2025-05-29 PROCEDURE — 6370000000 HC RX 637 (ALT 250 FOR IP): Performed by: STUDENT IN AN ORGANIZED HEALTH CARE EDUCATION/TRAINING PROGRAM

## 2025-05-29 PROCEDURE — 97162 PT EVAL MOD COMPLEX 30 MIN: CPT

## 2025-05-29 PROCEDURE — 2060000000 HC ICU INTERMEDIATE R&B

## 2025-05-29 RX ADMIN — OXYCODONE 2.5 MG: 5 TABLET ORAL at 15:25

## 2025-05-29 RX ADMIN — OXYCODONE 5 MG: 5 TABLET ORAL at 05:42

## 2025-05-29 RX ADMIN — ASPIRIN 81 MG: 81 TABLET, COATED ORAL at 21:34

## 2025-05-29 RX ADMIN — Medication 100 MG: at 11:04

## 2025-05-29 RX ADMIN — DILTIAZEM HYDROCHLORIDE 120 MG: 120 CAPSULE, EXTENDED RELEASE ORAL at 11:05

## 2025-05-29 RX ADMIN — SENNOSIDES, DOCUSATE SODIUM 1 TABLET: 50; 8.6 TABLET, FILM COATED ORAL at 11:04

## 2025-05-29 RX ADMIN — OXYCODONE 5 MG: 5 TABLET ORAL at 21:33

## 2025-05-29 RX ADMIN — ACETAMINOPHEN 650 MG: 325 TABLET ORAL at 18:05

## 2025-05-29 RX ADMIN — LOSARTAN POTASSIUM 50 MG: 50 TABLET, FILM COATED ORAL at 11:05

## 2025-05-29 RX ADMIN — POVIDONE-IODINE: 100 OINTMENT TOPICAL at 21:39

## 2025-05-29 RX ADMIN — MUPIROCIN: 20 OINTMENT TOPICAL at 15:26

## 2025-05-29 RX ADMIN — ROSUVASTATIN CALCIUM 40 MG: 40 TABLET, FILM COATED ORAL at 21:34

## 2025-05-29 RX ADMIN — ACETAMINOPHEN 650 MG: 325 TABLET ORAL at 21:31

## 2025-05-29 RX ADMIN — SOTALOL HYDROCHLORIDE 80 MG: 80 TABLET ORAL at 21:33

## 2025-05-29 RX ADMIN — ACETAMINOPHEN 650 MG: 325 TABLET ORAL at 05:43

## 2025-05-29 RX ADMIN — ACETAMINOPHEN 650 MG: 325 TABLET ORAL at 11:05

## 2025-05-29 RX ADMIN — CYCLOBENZAPRINE 10 MG: 10 TABLET, FILM COATED ORAL at 05:39

## 2025-05-29 RX ADMIN — MUPIROCIN: 20 OINTMENT TOPICAL at 21:40

## 2025-05-29 RX ADMIN — POVIDONE-IODINE: 100 OINTMENT TOPICAL at 15:26

## 2025-05-29 RX ADMIN — Medication 2000 UNITS: at 18:05

## 2025-05-29 RX ADMIN — ENOXAPARIN SODIUM 30 MG: 100 INJECTION SUBCUTANEOUS at 21:34

## 2025-05-29 RX ADMIN — CYCLOBENZAPRINE 10 MG: 10 TABLET, FILM COATED ORAL at 21:34

## 2025-05-29 RX ADMIN — ENOXAPARIN SODIUM 30 MG: 100 INJECTION SUBCUTANEOUS at 11:06

## 2025-05-29 RX ADMIN — SOTALOL HYDROCHLORIDE 80 MG: 80 TABLET ORAL at 11:06

## 2025-05-29 RX ADMIN — ASPIRIN 81 MG: 81 TABLET, COATED ORAL at 11:05

## 2025-05-29 RX ADMIN — SENNOSIDES, DOCUSATE SODIUM 1 TABLET: 50; 8.6 TABLET, FILM COATED ORAL at 21:33

## 2025-05-29 ASSESSMENT — PAIN DESCRIPTION - ORIENTATION
ORIENTATION: RIGHT

## 2025-05-29 ASSESSMENT — PAIN SCALES - GENERAL
PAINLEVEL_OUTOF10: 3
PAINLEVEL_OUTOF10: 8
PAINLEVEL_OUTOF10: 7
PAINLEVEL_OUTOF10: 8
PAINLEVEL_OUTOF10: 6
PAINLEVEL_OUTOF10: 9

## 2025-05-29 ASSESSMENT — PAIN DESCRIPTION - DESCRIPTORS
DESCRIPTORS: ACHING
DESCRIPTORS: BURNING

## 2025-05-29 ASSESSMENT — PAIN DESCRIPTION - LOCATION
LOCATION: KNEE
LOCATION: LEG
LOCATION: KNEE

## 2025-05-29 ASSESSMENT — PAIN - FUNCTIONAL ASSESSMENT
PAIN_FUNCTIONAL_ASSESSMENT: PREVENTS OR INTERFERES WITH MANY ACTIVE NOT PASSIVE ACTIVITIES
PAIN_FUNCTIONAL_ASSESSMENT: PREVENTS OR INTERFERES WITH MANY ACTIVE NOT PASSIVE ACTIVITIES

## 2025-05-29 NOTE — PROGRESS NOTES
Physical Therapy Med Surg Daily Treatment Note  Facility/Department: [facility] TELEMETRY  Room: [place] [address]       NAME: Allen Zambrano  : 1955 (69 y.o.)  MRN: [id number]  CODE STATUS: Full Code    Date of Service: 2025    Patient Diagnosis(es): Supraventricular tachycardia [I47.10]   No chief complaint on file.    Patient Active Problem List    Diagnosis Date Noted    Supraventricular tachycardia 2025    Hyperglycemia due to type 2 diabetes mellitus (HCC) 2025    Ventricular tachycardia (HCC) 2025    IV site infection 2025    Impaired mobility ADLs dt RTKA 2025    Post-op pain 2025    Status post total knee replacement, right 2025    Primary osteoarthritis of right knee 2024    Achilles tendinitis of left lower extremity 2023    Dyslipidemia 2023    Gastroesophageal reflux disease without esophagitis 2023    Obstructive sleep apnea 2023    PSVT (paroxysmal supraventricular tachycardia) 2023    Seasonal allergies 2023    Erectile dysfunction 2023    HTN (hypertension), benign 2023        Past Medical History:   Diagnosis Date    Hyperlipidemia     Osteoarthritis      Past Surgical History:   Procedure Laterality Date    ANTERIOR CRUCIATE LIGAMENT REPAIR Right     COLONOSCOPY      TOTAL KNEE ARTHROPLASTY Right 2025    RIGHT KNEE TOTAL KNEE  ARTHROPLASTY. REMOVAL OF DEEP IMPLANT performed by Sam Hutchison MD at [facility]       Chart Reviewed: Yes  Family/Caregiver Present: No  General  General Comments: Pt up attempting to ambulate indep in room with WW.    Restrictions:  Restrictions/Precautions: Fall Risk;Weight Bearing  Lower Extremity Weight Bearing Restrictions  Right Lower Extremity Weight Bearing: Weight Bearing As Tolerated  Position Activity Restriction  Other Position/Activity Restrictions: Immobilizer DCd  with +SLR    SUBJECTIVE:        Pain   8/10 post treatment.  No pain number given pre

## 2025-05-29 NOTE — H&P
DEPARTMENT OF HOSPITAL MEDICINE    HISTORY AND PHYSICAL EXAM    PATIENT NAME:  Allen Zambrano    MRN:  [id number]  SERVICE DATE:  5/28/2025   SERVICE TIME:  8:44 PM    Primary Care Physician: Eugene Youssef MD     SUBJECTIVE  CHIEF COMPLAINT:  No chief complaint on file.       HPI      Allen Zambrano is a 69 y.o., male with PMH HTN who presents as a direct admit from rehab for SVT.     He was admitted to rehab yesterday after recent R total knee replacement. Post op care complicated by SVT after which he was followed by cardiology. He was started on oral cardizem and BB and today was supposed to start Sotalol. Earlier today he reportedly had an SVT episode lasting 2 min with HR in 180s. Refer to my significant event note regarding details preceding this current admission.              PAST MEDICAL HISTORY:    Past Medical History:   Diagnosis Date    Hyperlipidemia     Osteoarthritis       PAST SURGICAL HISTORY:    Past Surgical History:   Procedure Laterality Date    ANTERIOR CRUCIATE LIGAMENT REPAIR Right     COLONOSCOPY      TOTAL KNEE ARTHROPLASTY Right 5/23/2025    RIGHT KNEE TOTAL KNEE  ARTHROPLASTY. REMOVAL OF DEEP IMPLANT performed by Sam Hutchison MD [place] [facility] [place]     FAMILY HISTORY:  No family history on file.  SOCIAL HISTORY:    Social History     Socioeconomic History    Marital status:      Spouse name: Natalie    Number of children: 1    Years of education: 16    Highest education level: Bachelor's degree (e.g., BA, AB, BS)   Occupational History    Not on file   Tobacco Use    Smoking status: Former     Types: Cigarettes    Smokeless tobacco: Former   Vaping Use    Vaping status: Not on file   Substance and Sexual Activity    Alcohol use: Yes     Comment: socially    Drug use: Never    Sexual activity: Not on file   Other Topics Concern    Not on file   Social History Narrative    Lives With: Spouse Natalie    Type of Home: [address] [place] [address] in [facility]         Home Layout: One level (showers    meloxicam (MOBIC) 7.5 MG tablet Take 1 tablet by mouth daily 5/2/25   Phillip Elmore MD   metoprolol succinate (TOPROL XL) 25 MG extended release tablet Take 2 tablets by mouth daily 5/2/25   Phillip Elmore MD   rosuvastatin (CRESTOR) 40 MG tablet Take 1 tablet by mouth daily 5/2/25   Phillip Elmore MD   losartan (COZAAR) 100 MG tablet Take 1 tablet by mouth daily 5/2/25   Phillip Elmore MD   fexofenadine-pseudoephedrine (ALLEGRA-D 12HR)  MG per extended release tablet Take 1 tablet by mouth in the morning and 1 tablet in the evening.    Phillip Elmore MD   cyclobenzaprine (FLEXERIL) 10 MG tablet Take 1 tablet by mouth 3 times daily    Phillip Elmore MD   ascorbic acid (VITAMIN C) 100 MG tablet Take by mouth    Phillip Elmore MD   azelastine (ASTELIN) 0.1 % nasal spray Administer into each nostril.    Phillip Elmore MD   Multiple Vitamin (MULTI-VITAMIN) TABS Take 1 tablet by mouth daily    Phillip Elmore MD   Zinc Acetate (GALZIN) 50 MG capsule Take 1 capsule by mouth daily 5/1/25   Phillip Elmore MD       ALLERGIES: Patient has no known allergies.    REVIEW OF SYSTEM:   Review of Systems   All other systems reviewed and are negative.        OBJECTIVE  PHYSICAL EXAM: /77   Pulse (!) 146   Resp 22     General Appearance: alert and oriented to person, place and time and in no acute distress  Skin: warm and dry  Head: normocephalic and atraumatic  Eyes: pupils equal, round, and reactive to light, extraocular eye movements intact, conjunctivae normal  Neck: neck supple and non tender without mass   Pulmonary/Chest: clear to auscultation bilaterally- no wheezes, rales or rhonchi, normal air movement, no respiratory distress  Cardiovascular: tachycardic, normal S1 and S2 and no carotid bruits  Abdomen: soft, non-tender, non-distended, normal bowel sounds, no masses or organomegaly  Extremities: no cyanosis, no clubbing and no

## 2025-05-29 NOTE — ACP (ADVANCE CARE PLANNING)
Advance Care Planning   Healthcare Decision Maker:    Primary Decision Maker: SERGIO WATSON - Child - [phone]    Secondary Decision Maker: LUIS CEJA - Spouse - [phone]    PT WOULD LIKE HIS DTR TO BE THE FIRST CONTACT.

## 2025-05-29 NOTE — PROGRESS NOTES
DVT / VTE PROPHYLAXIS EVALUATION    No results for input(s): \"BUN\", \"CREATININE\", \"PLT\", \"HGB\", \"HCT\", \"INR\" in the last 72 hours.  ADMITTING DX OR CHIEF COMPLAINT? RAPID RESPONSE-SVT WAS IN REHAB  WARFARIN? DOAC'S? NO  ANY APPARENT BLEEDING? NO  SCHEDULED SURGERY? NO     If yes to following, excluded from auto adjustment in Table 1 of policy - please contact provider with recommendations as appropriate.  Include condition/exception in scratch notes. Yes No   Trauma Service or Ortho Surgery []  [x]    Pregnancy []  [x]        Current order:  Enoxaparin 40 mg SUBQ once daily       ,    Estimated Creatinine Clearance: 113 mL/min (based on SCr of 0.75 mg/dL).    Plan:  Pharmacologic VTE prophylaxis modified based on patient weight per [facility]/P&T approved protocol     Patient Weight (kg)      50.9 and below .9 101-150.9 151-174.9 175 or greater   Estimated   CrCl  (ml/min) 30 or greater []   30 mg   SUBQ daily   []   40 mg   SUBQ daily (or 30 mg BID for orthopedic cases) [x]  30 mg SUBQ   BID*  []  40 mg   SUBQ   BID []  60mg SUBQ BID    15-29.9 []  UFH 5000   units SUBQ BID []  30 mg   SUBQ daily [] 30 mg SUBQ   daily []  40 mg SUBQ   daily [] 60 mg SUBQ   Daily*    Less than 15 or dialysis []  UFH 5000   units SUBQ BID [] UFH 5000 units SUBQ TID []  UFH 7500   units   SUBQ TID*   *Do not exceed enoxaparin 40mg daily or UFH 5000 units SUBQ TID in patients with epidurals,   lumbar drains, or external ventricular drains

## 2025-05-29 NOTE — PROGRESS NOTES
Facility/Department[place] [facility]  Physical Therapy Acute Rehab Discharge Summary  Room: [place] [id number]    NAME: Allen Zambrano  : 1955  MRN: [id number]    Admission Date: 2025  9:19 PM  Discharge Date: 25    Rehab Diagnosis(es): Impaired mobility and ADL's due to s/p RT Total Knee Arthroplasty. Rremoval of Deep implant r/t Osteoarthritis of RT knee. [facility] Rehab Admission 25  Patient Active Problem List    Diagnosis Date Noted    Hyperglycemia due to type 2 diabetes mellitus (HCC) 2025    Ventricular tachycardia (HCC) 2025    IV site infection 2025    Impaired mobility ADLs dt RTKA 2025    Post-op pain 2025    Status post total knee replacement, right 2025    Primary osteoarthritis of right knee 2024    Achilles tendinitis of left lower extremity 2023    Dyslipidemia 2023    Gastroesophageal reflux disease without esophagitis 2023    Obstructive sleep apnea 2023    PSVT (paroxysmal supraventricular tachycardia) 2023    Seasonal allergies 2023    Erectile dysfunction 2023    HTN (hypertension), benign 2023       Past Medical History:   Diagnosis Date    Hyperlipidemia     Osteoarthritis      Past Surgical History:   Procedure Laterality Date    ANTERIOR CRUCIATE LIGAMENT REPAIR Right     COLONOSCOPY      TOTAL KNEE ARTHROPLASTY Right 2025    RIGHT KNEE TOTAL KNEE  ARTHROPLASTY. REMOVAL OF DEEP IMPLANT performed by Sam Hutchison MD at [facility] [place]       Indications for Skilled Intervention: Decreased functional mobility , Decreased ROM, Decreased strength, Decreased endurance, Increased pain, Decreased balance, Decreased body mechanics, Decreased ADL status, Decreased safe awareness    GOALS:  Short Term Goals  Short Term Goal 1: Pt to complete HEP with indep  Long Term Goals  Long Term Goal 1: Pt to complete bed mobility with indep  Long Term Goal 2: Pt to complete transfers with indep  Long Term Goal 3:

## 2025-05-29 NOTE — PROGRESS NOTES
Physical Therapy Med Surg Initial Assessment  Facility/Department: [facility] 1W TELEMETRY  Room: [place] [address]       NAME: Allen Zambrano  : 1955 (69 y.o.)  MRN: [id number]  CODE STATUS: Full Code    Date of Service: 2025    Patient Diagnosis(es): SVT   No chief complaint on file.    Patient Active Problem List    Diagnosis Date Noted    Hyperglycemia due to type 2 diabetes mellitus (HCC) 2025    Ventricular tachycardia (HCC) 2025    IV site infection 2025    Impaired mobility ADLs dt RTKA 2025    Post-op pain 2025    Status post total knee replacement, right 2025    Primary osteoarthritis of right knee 2024    Achilles tendinitis of left lower extremity 2023    Dyslipidemia 2023    Gastroesophageal reflux disease without esophagitis 2023    Obstructive sleep apnea 2023    PSVT (paroxysmal supraventricular tachycardia) 2023    Seasonal allergies 2023    Erectile dysfunction 2023    HTN (hypertension), benign 2023        Past Medical History:   Diagnosis Date    Hyperlipidemia     Osteoarthritis      Past Surgical History:   Procedure Laterality Date    ANTERIOR CRUCIATE LIGAMENT REPAIR Right     COLONOSCOPY      TOTAL KNEE ARTHROPLASTY Right 2025    RIGHT KNEE TOTAL KNEE  ARTHROPLASTY. REMOVAL OF DEEP IMPLANT performed by Sam Hutchison MD at [facility] [place]       Chart Reviewed: Yes  Family/Caregiver Present: No  General  General Comments: Pt up attempting to ambulate indep in room with WW.    Restrictions:  Restrictions/Precautions: Fall Risk;Weight Bearing  Lower Extremity Weight Bearing Restrictions  Right Lower Extremity Weight Bearing: Weight Bearing As Tolerated     SUBJECTIVE:   Subjective: \"At least I can get out of bed alright.\"    Pain  Pain  Pre-Pain: 3  Post-Pain: 3  Pain Location: Right;Knee  Pain Descriptor: Sore  Pain Interventions: Nurse notified;Repositioning       Prior Level of  Exceptions: Wears glasses at all times  Hearing: Within functional limits    Cognition:  Overall Orientation Status: Within Functional Limits  Follows Commands: Within Functional Limits    Observation/Palpation  Observation: No acute distress noted. Mild/mod swelling R LE. Pleasant and motivated.    ROM:  AROM: Within functional limits  PROM: Within functional limits (limited R knee d/t swelling - grossly 25-95degrees)    Strength:  Strength: Generally decreased, functional    Neuro:  Balance  Sitting - Static: Good  Sitting - Dynamic: Good  Standing - Static: Good  Standing - Dynamic: Good;+                      Tone: Normal  Coordination: Generally decreased, functional    Sensation: Intact    Bed mobility  Rolling to Left: Independent  Rolling to Right: Independent  Supine to Sit: Independent  Sit to Supine: Independent    Transfers  Sit to Stand: Modified independent;Supervision  Stand to Sit: Modified independent  Bed to Chair: Modified independent  Comment: STS initially from low chair. Verbal cues for optimal set up position. Executes without assist. No LOB.    Ambulation  Surface: Level tile  Device: Rolling Walker  Assistance: Modified Independent  Quality of Gait: Non reciprocal transitioning to reciprocal pattern with increased distance. No LOB. Consistent.  Distance: 100ft X 2  Comments: HR WNL throughout via tele monitor                   Activity Tolerance  Activity Tolerance: Patient tolerated treatment well    Patient Education  Education Given To: Patient  Education Provided: Role of Therapy;Plan of Care;Mobility Training;Precautions;Fall Prevention Strategies;Transfer Training  Education Method: Verbal  Education Outcome: Verbalized understanding       ASSESSMENT:   Body Structures, Functions, Activity Limitations Requiring Skilled Therapeutic Intervention: Decreased functional mobility ;Decreased ROM;Decreased strength;Decreased endurance;Increased pain;Decreased balance;Decreased body

## 2025-05-29 NOTE — PROGRESS NOTES
Nutrition Assessment    Type and Reason for Visit:  Initial, Consult (Nutritional counseling re improved protein status, low carb diet, achieve ideal body weight, set and meet dietary goals.)    Nutrition Recommendations/Plan:   Continue General diet as tolerated     Malnutrition Assessment:  Malnutrition Status:  No malnutrition (05/29/25 1238)    Context:  Social/Environmental Circumstances     Findings of the 6 clinical characteristics of malnutrition:  Energy Intake:  No decrease in energy intake  Weight Loss:  No weight loss     Body Fat Loss:  No body fat loss     Muscle Mass Loss:  No muscle mass loss    Fluid Accumulation:  No fluid accumulation     Strength:  Not Performed    Nutrition Assessment:    No malnutrition identified. Nutritional status appears adequate at this time, based on available information. Pt appears well nourished per visible assessment.  Denies any GI &/or nutrition related concerns.  Reports stable weight prior to admission. Current diet is appropriate and tolerated,  no discharge needs identified at this time. RDN to follow up in 7-10 days per protocol.      Nutrition Related Findings:    s/p R knee arthroplasty ( 5/23), transferred to rehab ( 5/26), back to telemetry ( 5/28) for SVT. Appetite/intake good Wound Type: Surgical Incision       Current Nutrition Intake & Therapies:    Average Meal Intake: %  Average Supplements Intake: None Ordered  ADULT DIET; Regular    Anthropometric Measures:  Height: 177.8 cm (5' 10\")  Ideal Body Weight (IBW): 166 lbs (75 kg)    Admission Body Weight: 101.6 kg (224 lb)  Current Body Weight: 102.5 kg (226 lb) (* Edema), 136.1 % IBW. Weight Source: Not specified  Current BMI (kg/m2): 32.4  Usual Body Weight: 106.6 kg (235 lb) (( 11/2024), 238# ( 2023))     % Weight Change (Calculated): -3.8                    BMI Categories: Obese Class 1 (BMI 30.0-34.9)        Nutrition Diagnosis:   No nutrition diagnosis at this time     Nutrition

## 2025-05-29 NOTE — PROGRESS NOTES
[facility] Hospitalist Progress Note    Admitting Date and Time: 5/28/2025  7:16 PM  Admit Dx: SVT    Subjective:    No acute events overnight. Patient says he feels tired today and a little \"woozy\" on his feet. Tele reviewed showing NSR. We discussed discharge plans and he is unsure if he would want to dc with C vs wait to go back to rehab    ROS: denies fever, chills, cp, sob, n/v, HA unless stated above.       acetaminophen  650 mg Oral Q6H    sennosides-docusate sodium  1 tablet Oral BID    aspirin  81 mg Oral BID    rosuvastatin  40 mg Oral Nightly    dilTIAZem  120 mg Oral Daily    Vitamin D  2,000 Units Oral Dinner    [START ON 6/3/2025] cyanocobalamin  1,000 mcg IntraMUSCular Weekly    coenzyme Q10  100 mg Oral Daily    lidocaine  3 patch TransDERmal Daily    povidone-iodine   Topical TID    mupirocin   Topical BID    losartan  50 mg Oral Daily    sotalol  80 mg Oral BID    enoxaparin  30 mg SubCUTAneous BID     oxyCODONE, 2.5 mg, Q4H PRN   Or  oxyCODONE, 5 mg, Q4H PRN  cyclobenzaprine, 10 mg, TID PRN  aluminum & magnesium hydroxide-simethicone, 15 mL, Q6H PRN  metoprolol, 5 mg, Q6H PRN  acetaminophen, 650 mg, Q4H PRN  bisacodyl, 10 mg, Daily PRN  sodium phosphate, 1 enema, Daily PRN         Objective:    /64   Pulse 74   Temp 97.9 °F (36.6 °C) (Oral)   Resp 18   Ht 1.778 m (5' 10\")   Wt 102.6 kg (226 lb 4.8 oz)   SpO2 98%   BMI 32.47 kg/m²     General Appearance: alert and oriented to person, place and time and in no acute distress  Skin: warm and dry  Head: normocephalic and atraumatic  Eyes: pupils equal, round, and reactive to light, extraocular eye movements intact, conjunctivae normal  Neck: neck supple and non tender without mass   Pulmonary/Chest: clear to auscultation bilaterally- no wheezes, rales or rhonchi, normal air movement, no respiratory distress  Cardiovascular: normal rate, normal S1 and S2 and no carotid bruits  Abdomen: soft, non-tender, non-distended, normal

## 2025-05-29 NOTE — CARE COORDINATION
Case Management Assessment  Initial Evaluation    Date/Time of Evaluation: 5/29/2025 10:45 AM  Assessment Completed by: Luz Viera RN    If patient is discharged prior to next notation, then this note serves as note for discharge by case management.    Patient Name: Allen Ceja                   YOB: 1955  Diagnosis: SVT                   Date / Time: 5/28/2025  7:16 PM    Patient Admission Status: Inpatient   Readmission Risk (Low < 19, Mod (19-27), High > 27): Readmission Risk Score: 14.3    Current PCP: Eugene Youssef MD  PCP verified by CM? Yes    Chart Reviewed: Yes      History Provided by: Patient  Patient Orientation: Alert and Oriented, Person, Place, Situation, Self    Patient Cognition: Alert    Hospitalization in the last 30 days (Readmission):  Yes    If yes, Readmission Assessment in  Navigator will be completed.    Advance Directives:      Code Status: Full Code   Patient's Primary Decision Maker is: Legal Next of Kin    Primary Decision Maker: SERGIO WATSON - Child - [phone]    Secondary Decision Maker: LUIS CEJA - Spouse - [phone]    Discharge Planning:    Patient lives with: Spouse/Significant Other Type of Home:    Primary Care Giver: Self  Patient Support Systems include: Spouse/Significant Other, Children, Family Members, /   Current Financial resources:    Current community resources:    Current services prior to admission: Durable Medical Equipment            Current DME: Cane, Walker, Cpap, Shower Chair            Type of Home Care services:       ADLS  Prior functional level: Independent in ADLs/IADLs  Current functional level: Independent in ADLs/IADLs, Assistance with the following:, Mobility, Shopping, Housework    PT AM-PAC:   /24  OT AM-PAC:   /24    Family can provide assistance at DC: Yes  Would you like Case Management to discuss the discharge plan with any other family members/significant others, and if so, who?

## 2025-05-29 NOTE — CONSULTS
[place], [place]    CARDIOLOGY CONSULTATION REPORT    DATE OF CONSULTATION  5/29/2025    CONSULTANT  Emory Hansen DO     REQUESTING PHYSICIAN  Fredy Braxton MD     PRIMARY CARDIOLOGIST  Dr. De La Rosa - [facility]     REASON FOR CONSULTATION  SVT    HISTORY OF PRESENT ILLNESS  Allen Zambrano is a 69 y.o. male with history of paroxysmal SVT/atrial tachycardia, hypertension, hyperlipidemia who presents back to [facility] from rehab with recurrence of pSVT last evening. The episode occurred before he could get his first dose of sotalol. He was on IV Cardizem infusion and given his sotalol dose with conversion to SR.  As usual he feels palpitations with HR up to 170-180s. No associated shortness of breath, lightheadedness, dizziness, near-syncope, or syncope.  EKG this morning showed stable QTC.       Allergies  No Known Allergies    Medications   acetaminophen, 650 mg, Q6H  sennosides-docusate sodium, 1 tablet, BID  aspirin, 81 mg, BID  rosuvastatin, 40 mg, Nightly  dilTIAZem, 120 mg, Daily  Vitamin D, 2,000 Units, Dinner  [START ON 6/3/2025] cyanocobalamin, 1,000 mcg, Weekly  coenzyme Q10, 100 mg, Daily  lidocaine, 3 patch, Daily  povidone-iodine, , TID  mupirocin, , BID  losartan, 50 mg, Daily  sotalol, 80 mg, BID  enoxaparin, 30 mg, BID        Past Medical History:   Diagnosis Date    Hyperlipidemia     Osteoarthritis       Past Surgical History:   Procedure Laterality Date    ANTERIOR CRUCIATE LIGAMENT REPAIR Right     COLONOSCOPY      TOTAL KNEE ARTHROPLASTY Right 5/23/2025    RIGHT KNEE TOTAL KNEE  ARTHROPLASTY. REMOVAL OF DEEP IMPLANT performed by Sam Hutchison MD at [facility] [place]      Social History     Tobacco Use    Smoking status: Former     Types: Cigarettes    Smokeless tobacco: Former   Substance Use Topics    Alcohol use: Yes     Comment: socially      No family history on file.     Review of Systems  As noted in HPI    PHYSICAL EXAM  /64   Pulse 74   Temp 97.9 °F (36.6 °C)

## 2025-05-30 LAB
EKG ATRIAL RATE: 178 BPM
EKG ATRIAL RATE: 88 BPM
EKG DIAGNOSIS: NORMAL
EKG DIAGNOSIS: NORMAL
EKG P AXIS: 60 DEGREES
EKG P-R INTERVAL: 162 MS
EKG Q-T INTERVAL: 274 MS
EKG Q-T INTERVAL: 376 MS
EKG QRS DURATION: 100 MS
EKG QRS DURATION: 118 MS
EKG QTC CALCULATION (BAZETT): 454 MS
EKG QTC CALCULATION (BAZETT): 464 MS
EKG R AXIS: -58 DEGREES
EKG R AXIS: -59 DEGREES
EKG T AXIS: 118 DEGREES
EKG T AXIS: 77 DEGREES
EKG VENTRICULAR RATE: 173 BPM
EKG VENTRICULAR RATE: 88 BPM

## 2025-05-30 PROCEDURE — 6370000000 HC RX 637 (ALT 250 FOR IP): Performed by: STUDENT IN AN ORGANIZED HEALTH CARE EDUCATION/TRAINING PROGRAM

## 2025-05-30 PROCEDURE — 99233 SBSQ HOSP IP/OBS HIGH 50: CPT | Performed by: INTERNAL MEDICINE

## 2025-05-30 PROCEDURE — 97166 OT EVAL MOD COMPLEX 45 MIN: CPT

## 2025-05-30 PROCEDURE — 97116 GAIT TRAINING THERAPY: CPT

## 2025-05-30 PROCEDURE — 2060000000 HC ICU INTERMEDIATE R&B

## 2025-05-30 PROCEDURE — APPSS45 APP SPLIT SHARED TIME 31-45 MINUTES

## 2025-05-30 PROCEDURE — 6360000002 HC RX W HCPCS: Performed by: STUDENT IN AN ORGANIZED HEALTH CARE EDUCATION/TRAINING PROGRAM

## 2025-05-30 PROCEDURE — 97110 THERAPEUTIC EXERCISES: CPT

## 2025-05-30 RX ORDER — DILTIAZEM HYDROCHLORIDE 180 MG/1
180 CAPSULE, COATED, EXTENDED RELEASE ORAL DAILY
Status: DISCONTINUED | OUTPATIENT
Start: 2025-05-31 | End: 2025-05-31 | Stop reason: HOSPADM

## 2025-05-30 RX ADMIN — Medication 2000 UNITS: at 16:38

## 2025-05-30 RX ADMIN — LOSARTAN POTASSIUM 50 MG: 50 TABLET, FILM COATED ORAL at 08:28

## 2025-05-30 RX ADMIN — ACETAMINOPHEN 650 MG: 325 TABLET ORAL at 16:38

## 2025-05-30 RX ADMIN — SENNOSIDES, DOCUSATE SODIUM 1 TABLET: 50; 8.6 TABLET, FILM COATED ORAL at 21:40

## 2025-05-30 RX ADMIN — POVIDONE-IODINE: 100 OINTMENT TOPICAL at 21:56

## 2025-05-30 RX ADMIN — ACETAMINOPHEN 650 MG: 325 TABLET ORAL at 22:55

## 2025-05-30 RX ADMIN — ROSUVASTATIN CALCIUM 40 MG: 40 TABLET, FILM COATED ORAL at 21:39

## 2025-05-30 RX ADMIN — ACETAMINOPHEN 650 MG: 325 TABLET ORAL at 11:27

## 2025-05-30 RX ADMIN — SOTALOL HYDROCHLORIDE 80 MG: 80 TABLET ORAL at 21:39

## 2025-05-30 RX ADMIN — POVIDONE-IODINE: 100 OINTMENT TOPICAL at 10:08

## 2025-05-30 RX ADMIN — OXYCODONE 5 MG: 5 TABLET ORAL at 22:56

## 2025-05-30 RX ADMIN — DILTIAZEM HYDROCHLORIDE 120 MG: 120 CAPSULE, EXTENDED RELEASE ORAL at 08:22

## 2025-05-30 RX ADMIN — POVIDONE-IODINE: 100 OINTMENT TOPICAL at 16:40

## 2025-05-30 RX ADMIN — ACETAMINOPHEN 325 MG: 325 TABLET ORAL at 06:40

## 2025-05-30 RX ADMIN — MUPIROCIN: 20 OINTMENT TOPICAL at 21:50

## 2025-05-30 RX ADMIN — ASPIRIN 81 MG: 81 TABLET, COATED ORAL at 08:22

## 2025-05-30 RX ADMIN — ENOXAPARIN SODIUM 30 MG: 100 INJECTION SUBCUTANEOUS at 08:22

## 2025-05-30 RX ADMIN — MUPIROCIN: 20 OINTMENT TOPICAL at 08:28

## 2025-05-30 RX ADMIN — ENOXAPARIN SODIUM 30 MG: 100 INJECTION SUBCUTANEOUS at 21:40

## 2025-05-30 RX ADMIN — Medication 100 MG: at 08:22

## 2025-05-30 RX ADMIN — ASPIRIN 81 MG: 81 TABLET, COATED ORAL at 21:39

## 2025-05-30 RX ADMIN — SOTALOL HYDROCHLORIDE 80 MG: 80 TABLET ORAL at 08:22

## 2025-05-30 RX ADMIN — SENNOSIDES, DOCUSATE SODIUM 1 TABLET: 50; 8.6 TABLET, FILM COATED ORAL at 08:22

## 2025-05-30 RX ADMIN — POVIDONE-IODINE: 100 OINTMENT TOPICAL at 08:27

## 2025-05-30 ASSESSMENT — PAIN DESCRIPTION - ORIENTATION
ORIENTATION: RIGHT
ORIENTATION: RIGHT

## 2025-05-30 ASSESSMENT — PAIN DESCRIPTION - LOCATION
LOCATION: KNEE
LOCATION: KNEE;LEG
LOCATION: KNEE

## 2025-05-30 ASSESSMENT — PAIN DESCRIPTION - DESCRIPTORS
DESCRIPTORS: ACHING
DESCRIPTORS: THROBBING

## 2025-05-30 ASSESSMENT — PAIN SCALES - GENERAL
PAINLEVEL_OUTOF10: 8
PAINLEVEL_OUTOF10: 4
PAINLEVEL_OUTOF10: 8
PAINLEVEL_OUTOF10: 0

## 2025-05-30 ASSESSMENT — PAIN - FUNCTIONAL ASSESSMENT: PAIN_FUNCTIONAL_ASSESSMENT: PREVENTS OR INTERFERES WITH MANY ACTIVE NOT PASSIVE ACTIVITIES

## 2025-05-30 NOTE — PROGRESS NOTES
Physical Therapy Med Surg Daily Treatment Note  Facility/Department: [facility] 1 TELEMETRY  Room: [place] [address]       NAME: Allen Zambrano  : 1955 (69 y.o.)  MRN: [id number]  CODE STATUS: Full Code    Date of Service: 2025    Patient Diagnosis(es): Supraventricular tachycardia [I47.10]   No chief complaint on file.    Patient Active Problem List    Diagnosis Date Noted    Supraventricular tachycardia 2025    Hyperglycemia due to type 2 diabetes mellitus (HCC) 2025    Ventricular tachycardia (HCC) 2025    IV site infection 2025    Impaired mobility ADLs dt RTKA 2025    Post-op pain 2025    Status post total knee replacement, right 2025    Primary osteoarthritis of right knee 2024    Achilles tendinitis of left lower extremity 2023    Dyslipidemia 2023    Gastroesophageal reflux disease without esophagitis 2023    Obstructive sleep apnea 2023    PSVT (paroxysmal supraventricular tachycardia) 2023    Seasonal allergies 2023    Erectile dysfunction 2023    HTN (hypertension), benign 2023        Past Medical History:   Diagnosis Date    Hyperlipidemia     Osteoarthritis      Past Surgical History:   Procedure Laterality Date    ANTERIOR CRUCIATE LIGAMENT REPAIR Right     COLONOSCOPY      TOTAL KNEE ARTHROPLASTY Right 2025    RIGHT KNEE TOTAL KNEE  ARTHROPLASTY. REMOVAL OF DEEP IMPLANT performed by Sam Hutchison MD at [facility] OR       Chart Reviewed: Yes    Restrictions:  Restrictions/Precautions: Fall Risk;Weight Bearing  Lower Extremity Weight Bearing Restrictions  Right Lower Extremity Weight Bearing: Weight Bearing As Tolerated  Position Activity Restriction  Other Position/Activity Restrictions: Immobilizer DCd  with +SLR    SUBJECTIVE:   Subjective: I have questions about getting into and out of the shower. I also have stairs in the house that has 1 rail.    Pain  Pain  Pre-Pain: 2  Post-Pain: 2  Pain

## 2025-05-30 NOTE — PROGRESS NOTES
06:02 AM    MCH 31.8 05/29/2025 06:02 AM    MCHC 35.3 05/29/2025 06:02 AM    RDW 11.9 05/29/2025 06:02 AM    LYMPHOPCT 15.0 05/29/2025 06:02 AM    MONOPCT 8.5 05/29/2025 06:02 AM    EOSPCT 7.0 05/29/2025 06:02 AM    BASOPCT 1.0 05/29/2025 06:02 AM    MONOSABS 0.9 05/29/2025 06:02 AM    LYMPHSABS 1.7 05/29/2025 06:02 AM    EOSABS 0.7 05/29/2025 06:02 AM    BASOSABS 0.1 05/29/2025 06:02 AM     CMP:    Lab Results   Component Value Date/Time     05/29/2025 06:02 AM    K 4.2 05/29/2025 06:02 AM    K 4.0 05/25/2025 02:39 AM     05/29/2025 06:02 AM    CO2 27 05/29/2025 06:02 AM    BUN 9 05/29/2025 06:02 AM    CREATININE 0.72 05/29/2025 06:02 AM    LABGLOM >90.0 05/29/2025 06:02 AM    GLUCOSE 106 05/29/2025 06:02 AM    CALCIUM 8.9 05/29/2025 06:02 AM    BILITOT 0.5 05/25/2025 02:39 AM    ALKPHOS 49 05/25/2025 02:39 AM    AST 13 05/25/2025 02:39 AM    ALT 7 05/25/2025 02:39 AM     BMP:    Lab Results   Component Value Date/Time     05/29/2025 06:02 AM    K 4.2 05/29/2025 06:02 AM    K 4.0 05/25/2025 02:39 AM     05/29/2025 06:02 AM    CO2 27 05/29/2025 06:02 AM    BUN 9 05/29/2025 06:02 AM    CREATININE 0.72 05/29/2025 06:02 AM    CALCIUM 8.9 05/29/2025 06:02 AM    LABGLOM >90.0 05/29/2025 06:02 AM    GLUCOSE 106 05/29/2025 06:02 AM     Magnesium:    Lab Results   Component Value Date/Time    MG 2.0 05/25/2025 02:39 AM     Troponin:  No results found for: \"TROPONINI\"    Radiology:  No results found.     Assessment:       Recurrent symptomatic PSVT--now back to sinus rhythm  Known history of PSVT/atrial tachycardia  Normal LV systolic function, EF 60 to 65%  Hypertension  Hyperlipidemia  OA status post TKA    Plan:  Continue sotalol loading 80 mg twice daily  Daily EKGs. Continue to monitor QTc closely over next day.  Continue Cardizem  mg daily  Continue aspirin 81 mg daily  Continue to monitor on telemetry for any arrhythmias  Continue to monitor electrolytes and hemoglobin  Further

## 2025-05-30 NOTE — PLAN OF CARE
See OT evaluation for all goals and OT POC. Electronically signed by Maria Luisa Metz OTR/L on 5/30/2025 at 12:14 PM

## 2025-05-30 NOTE — PROGRESS NOTES
[facility] OCCUPATIONAL THERAPY EVALUATION - ACUTE     NAME: Allen Zambrano  : 1955 (69 y.o.)  MRN: [id number]  CODE STATUS: Full Code  Room: 73/W173-01    Date of Service: 2025    Patient Diagnosis(es): Supraventricular tachycardia [I47.10]   Patient Active Problem List    Diagnosis Date Noted    Supraventricular tachycardia 2025    Hyperglycemia due to type 2 diabetes mellitus (HCC) 2025    Ventricular tachycardia (HCC) 2025    IV site infection 2025    Impaired mobility ADLs dt RTKA 2025    Post-op pain 2025    Status post total knee replacement, right 2025    Primary osteoarthritis of right knee 2024    Achilles tendinitis of left lower extremity 2023    Dyslipidemia 2023    Gastroesophageal reflux disease without esophagitis 2023    Obstructive sleep apnea 2023    PSVT (paroxysmal supraventricular tachycardia) 2023    Seasonal allergies 2023    Erectile dysfunction 2023    HTN (hypertension), benign 2023      No data found    Past Medical History:   Diagnosis Date    Hyperlipidemia     Osteoarthritis      Past Surgical History:   Procedure Laterality Date    ANTERIOR CRUCIATE LIGAMENT REPAIR Right     COLONOSCOPY      TOTAL KNEE ARTHROPLASTY Right 2025    RIGHT KNEE TOTAL KNEE  ARTHROPLASTY. REMOVAL OF DEEP IMPLANT performed by [place] Hutchison [place] [facility] [place]        Restrictions  Restrictions/Precautions: Fall Risk;Weight Bearing         Right Lower Extremity Weight Bearing: Weight Bearing As Tolerated  Other Position/Activity Restrictions: Immobilizer DCd  with +SLR    Safety Devices: Safety Devices  Type of Devices: All fall risk precautions in place;Call light within reach;Left in bed     Patient's date of birth confirmed: Yes    General:  Chart Reviewed: Yes  Patient assessed for rehabilitation services?: Yes    Subjective  Subjective: \"I took a bird bath but washing the rest of me would  concerns.  Performance deficits / Impairments: Decreased functional mobility , Decreased endurance, Decreased ADL status, Decreased balance, Decreased high-level IADLs  Prognosis: Good  Discharge Recommendations: Continue to assess pending progress  Decision Making: Medium Complexity     History: Pt's medical history is moderately complex  Exam: Pt has 5 performance deficits  Assistance / Modification: Pt requires min A    AMPAC (Six Click) Self care Score    How much help is needed for putting on and taking off regular lower body clothing?: A Little  How much help is needed for bathing (which includes washing, rinsing, drying)?: A Little  How much help is needed for toileting (which includes using toilet, bedpan, or urinal)?: A Little  How much help is needed for putting on and taking off regular upper body clothing?: A Little  How much help is needed for taking care of personal grooming?: None  How much help for eating meals?: None  -[facility] Inpatient Daily Activity Raw Score: 20  AM-PAC Inpatient ADL T-Scale Score : 42.03  ADL Inpatient CMS 0-100% Score: 38.32    Therapy key for assistance levels -   Independent/Mod I = Pt. is able to perform task with no assistance but may require a device   Stand by assistance = Pt. does not perform task at an independent level but does not need physical assistance, requires verbal cues  Minimal, Moderate, Maximal Assistance = Pt. requires physical assistance (25%, 50%, 75% assist from helper) for task but is able to actively participate in task   Dependent = Pt. requires total assistance with task and is not able to actively participate with task completion     Plan:  Occupational Therapy Plan  Times Per Week: 1-2 visits  Therapy Duration:  (Length of acute stay)  Current Treatment Recommendations: Strengthening, Balance training, Functional mobility training, Endurance training, Patient/Caregiver education & training, Equipment evaluation, education, & procurement, Self-Care /

## 2025-05-30 NOTE — FLOWSHEET NOTE
Aquacel dressing removed per order for post-op day 7. Incision clean dry and intact with minimal redness and swelling. No drainage noted at this time incision left ANUSHA.Electronically signed by Mita Villegas RN on 5/30/2025 at 6:39 PM

## 2025-05-30 NOTE — PLAN OF CARE
Problem: Chronic Conditions and Co-morbidities  Goal: Patient's chronic conditions and co-morbidity symptoms are monitored and maintained or improved  Recent Flowsheet Documentation  Taken 5/29/2025 1045 by Idalia El, RN  Care Plan - Patient's Chronic Conditions and Co-Morbidity Symptoms are Monitored and Maintained or Improved:   Monitor and assess patient's chronic conditions and comorbid symptoms for stability, deterioration, or improvement   Collaborate with multidisciplinary team to address chronic and comorbid conditions and prevent exacerbation or deterioration   Update acute care plan with appropriate goals if chronic or comorbid symptoms are exacerbated and prevent overall improvement and discharge     Problem: Discharge Planning  Goal: Discharge to home or other facility with appropriate resources  Outcome: Progressing  Flowsheets (Taken 5/29/2025 1045 by Idalia El, RN)  Discharge to home or other facility with appropriate resources:   Identify barriers to discharge with patient and caregiver   Arrange for needed discharge resources and transportation as appropriate   Identify discharge learning needs (meds, wound care, etc)   Refer to discharge planning if patient needs post-hospital services based on physician order or complex needs related to functional status, cognitive ability or social support system     Problem: Safety - Adult  Goal: Free from fall injury  Outcome: Progressing

## 2025-05-30 NOTE — PROGRESS NOTES
[facility] Hospitalist Progress Note    Admitting Date and Time: 5/28/2025  7:16 PM  Admit Dx: Supraventricular tachycardia [I47.10]    Subjective:    No acute events overnight. Remains in NSR. He said he feels slightly \"off\" over the last couple days. He is unsure if it is lack of sleep vs new medicine. I advised him to bring up symptoms to cardiologist as they are managing his antiarrhythmics     ROS: denies fever, chills, cp, sob, n/v, HA unless stated above.       acetaminophen  650 mg Oral Q6H    sennosides-docusate sodium  1 tablet Oral BID    aspirin  81 mg Oral BID    rosuvastatin  40 mg Oral Nightly    dilTIAZem  120 mg Oral Daily    Vitamin D  2,000 Units Oral Dinner    [START ON 6/3/2025] cyanocobalamin  1,000 mcg IntraMUSCular Weekly    coenzyme Q10  100 mg Oral Daily    lidocaine  3 patch TransDERmal Daily    povidone-iodine   Topical TID    mupirocin   Topical BID    losartan  50 mg Oral Daily    sotalol  80 mg Oral BID    enoxaparin  30 mg SubCUTAneous BID     oxyCODONE, 2.5 mg, Q4H PRN   Or  oxyCODONE, 5 mg, Q4H PRN  cyclobenzaprine, 10 mg, TID PRN  aluminum & magnesium hydroxide-simethicone, 15 mL, Q6H PRN  metoprolol, 5 mg, Q6H PRN  acetaminophen, 650 mg, Q4H PRN  bisacodyl, 10 mg, Daily PRN  sodium phosphate, 1 enema, Daily PRN         Objective:    /75   Pulse 71   Temp 98.2 °F (36.8 °C) (Oral)   Resp 18   Ht 1.778 m (5' 10\")   Wt 102.6 kg (226 lb 4.8 oz)   SpO2 99%   BMI 32.47 kg/m²     General Appearance: alert and oriented to person, place and time and in no acute distress  Skin: warm and dry  Head: normocephalic and atraumatic  Eyes: pupils equal, round, and reactive to light, extraocular eye movements intact, conjunctivae normal  Neck: neck supple and non tender without mass   Pulmonary/Chest: clear to auscultation bilaterally- no wheezes, rales or rhonchi, normal air movement, no respiratory distress  Cardiovascular: normal rate, normal S1 and S2 and no carotid

## 2025-05-30 NOTE — PROGRESS NOTES
Physical Therapy Missed Treatment   Facility/Department: [facility] MED SURG W173/W173-01    NAME: Allen Zambrano    : 1955 (69 y.o.)  MRN: [id number]    Account: [id number]  Gender: male    Chart reviewed, attempted PT at 13:40. Patient unavailable 2° to:    [] Hold per nsg request    [x] Pt declined d/t being tired.     [] Nsg notified   [] Other notified    [] Pt.. off floor for test/procedure.     [] Pt. Unavailable        Will attempt PT treatment again at earliest convenience.      Electronically signed by Jayna Vaz PTA on 25 at 1:48 PM EDT

## 2025-05-30 NOTE — CARE COORDINATION
MET WITH PATIENT, DC PLAN REMAINS HOME TOMORROW WITH OP PT.  PT DENIES ANY OTHER NEEDS AT THIS TIME.  IMM REVIEWED, SIGNED AND COPY GIVEN TO PT.

## 2025-05-31 VITALS
HEART RATE: 73 BPM | OXYGEN SATURATION: 99 % | RESPIRATION RATE: 17 BRPM | WEIGHT: 226.3 LBS | HEIGHT: 70 IN | BODY MASS INDEX: 32.4 KG/M2 | SYSTOLIC BLOOD PRESSURE: 145 MMHG | TEMPERATURE: 97.9 F | DIASTOLIC BLOOD PRESSURE: 67 MMHG

## 2025-05-31 LAB
EKG ATRIAL RATE: 77 BPM
EKG ATRIAL RATE: 78 BPM
EKG DIAGNOSIS: NORMAL
EKG DIAGNOSIS: NORMAL
EKG P AXIS: 28 DEGREES
EKG P AXIS: 58 DEGREES
EKG P-R INTERVAL: 156 MS
EKG P-R INTERVAL: 168 MS
EKG Q-T INTERVAL: 394 MS
EKG Q-T INTERVAL: 396 MS
EKG QRS DURATION: 112 MS
EKG QRS DURATION: 114 MS
EKG QTC CALCULATION (BAZETT): 448 MS
EKG QTC CALCULATION (BAZETT): 449 MS
EKG R AXIS: -51 DEGREES
EKG R AXIS: -53 DEGREES
EKG T AXIS: 42 DEGREES
EKG T AXIS: 64 DEGREES
EKG VENTRICULAR RATE: 77 BPM
EKG VENTRICULAR RATE: 78 BPM

## 2025-05-31 PROCEDURE — 99232 SBSQ HOSP IP/OBS MODERATE 35: CPT | Performed by: INTERNAL MEDICINE

## 2025-05-31 PROCEDURE — 6370000000 HC RX 637 (ALT 250 FOR IP): Performed by: INTERNAL MEDICINE

## 2025-05-31 PROCEDURE — 6370000000 HC RX 637 (ALT 250 FOR IP): Performed by: STUDENT IN AN ORGANIZED HEALTH CARE EDUCATION/TRAINING PROGRAM

## 2025-05-31 PROCEDURE — 97116 GAIT TRAINING THERAPY: CPT

## 2025-05-31 PROCEDURE — 6360000002 HC RX W HCPCS: Performed by: STUDENT IN AN ORGANIZED HEALTH CARE EDUCATION/TRAINING PROGRAM

## 2025-05-31 RX ORDER — OXYCODONE HYDROCHLORIDE 5 MG/1
5 TABLET ORAL EVERY 4 HOURS PRN
Qty: 30 TABLET | Refills: 0 | Status: SHIPPED | OUTPATIENT
Start: 2025-05-31 | End: 2025-06-07

## 2025-05-31 RX ORDER — UBIDECARENONE 100 MG
100 CAPSULE ORAL DAILY
Qty: 30 CAPSULE | Refills: 1 | Status: SHIPPED | OUTPATIENT
Start: 2025-06-01

## 2025-05-31 RX ORDER — ASPIRIN 81 MG/1
TABLET ORAL
Qty: 42 TABLET | Refills: 0 | Status: SHIPPED | OUTPATIENT
Start: 2025-05-31

## 2025-05-31 RX ORDER — SOTALOL HYDROCHLORIDE 80 MG/1
80 TABLET ORAL 2 TIMES DAILY
Qty: 60 TABLET | Refills: 3 | Status: SHIPPED | OUTPATIENT
Start: 2025-05-31

## 2025-05-31 RX ORDER — DILTIAZEM HYDROCHLORIDE 180 MG/1
180 CAPSULE, COATED, EXTENDED RELEASE ORAL DAILY
Qty: 30 CAPSULE | Refills: 3 | Status: SHIPPED | OUTPATIENT
Start: 2025-06-01

## 2025-05-31 RX ADMIN — DILTIAZEM HYDROCHLORIDE 180 MG: 180 CAPSULE, EXTENDED RELEASE ORAL at 10:36

## 2025-05-31 RX ADMIN — ACETAMINOPHEN 650 MG: 325 TABLET ORAL at 10:47

## 2025-05-31 RX ADMIN — POVIDONE-IODINE: 100 OINTMENT TOPICAL at 10:38

## 2025-05-31 RX ADMIN — MUPIROCIN: 20 OINTMENT TOPICAL at 10:38

## 2025-05-31 RX ADMIN — SOTALOL HYDROCHLORIDE 80 MG: 80 TABLET ORAL at 10:36

## 2025-05-31 RX ADMIN — ASPIRIN 81 MG: 81 TABLET, COATED ORAL at 10:36

## 2025-05-31 RX ADMIN — Medication 100 MG: at 10:35

## 2025-05-31 RX ADMIN — ENOXAPARIN SODIUM 30 MG: 100 INJECTION SUBCUTANEOUS at 10:35

## 2025-05-31 RX ADMIN — SENNOSIDES, DOCUSATE SODIUM 1 TABLET: 50; 8.6 TABLET, FILM COATED ORAL at 10:36

## 2025-05-31 RX ADMIN — LOSARTAN POTASSIUM 50 MG: 50 TABLET, FILM COATED ORAL at 10:36

## 2025-05-31 ASSESSMENT — PAIN DESCRIPTION - ORIENTATION: ORIENTATION: RIGHT

## 2025-05-31 ASSESSMENT — PAIN SCALES - GENERAL: PAINLEVEL_OUTOF10: 5

## 2025-05-31 ASSESSMENT — PAIN DESCRIPTION - DESCRIPTORS: DESCRIPTORS: ACHING;DISCOMFORT

## 2025-05-31 ASSESSMENT — PAIN DESCRIPTION - LOCATION: LOCATION: LEG

## 2025-05-31 NOTE — DISCHARGE SUMMARY
Discharge summary  This patient Allen Zambrano was admitted to the hospital on 5/23/2025  after undergoing Procedure(s) (LRB):  RIGHT KNEE TOTAL KNEE  ARTHROPLASTY. REMOVAL OF DEEP IMPLANT (Right) without complications that morning.    During the postoperative period,while in hospital, patient was medically managed by the hospitalist. Please see medial notes and H&P for patients additional diagnoses.  Ortho agrees with all medical diagnoses and treatments while patient in hospital.    No significant or unexpected findings noted during hospital stay......    Hospital course  Patient tolerated surgical procedure well and there was no complications. Patient progressed adequtly through their recovery during hospital stay including PT and rehabilitation.  DVT prophylaxis was implemented POD#1  Patient was then D/C on 5/26/2025 to Home  in stable condition.  Patient was instructed on the use of pain medications, the signs and symptoms of infection, and was given our number to call should they have any questions or concerns following discharge.

## 2025-05-31 NOTE — DISCHARGE INSTR - DIET
Good nutrition is important when healing from an illness, injury, or surgery.  Follow any nutrition recommendations given to you during your hospital stay.   If you were given an oral nutrition supplement while in the hospital, continue to take this supplement at home.  You can take it with meals, in-between meals, and/or before bedtime. These supplements can be purchased at most local grocery stores, pharmacies, and chain [employer]stores.   If you have any questions about your diet or nutrition, call the hospital and ask for the dietitian.  Low salt, low fat, low cholesterol diet.

## 2025-05-31 NOTE — PROGRESS NOTES
Physical Therapy Med Surg Daily Treatment Note  [place]ility[facility]Department: [facility]  Room: [place] [address]       NAME: Allen Zambrano  : 1955 (69 y.o.)  MRN: [id number]  CODE STATUS: Full Code    Date of Service: 2025    Patient Diagnosis(es): Supraventricular tachycardia [I47.10]   No chief complaint on file.    Patient Active Problem List    Diagnosis Date Noted    Supraventricular tachycardia 2025    Hyperglycemia due to type 2 diabetes mellitus (HCC) 2025    Ventricular tachycardia (HCC) 2025    IV site infection 2025    Impaired mobility ADLs dt RTKA 2025    Post-op pain 2025    Status post total knee replacement, right 2025    Primary osteoarthritis of right knee 2024    Achilles tendinitis of left lower extremity 2023    Dyslipidemia 2023    Gastroesophageal reflux disease without esophagitis 2023    Obstructive sleep apnea 2023    PSVT (paroxysmal supraventricular tachycardia) 2023    Seasonal allergies 2023    Erectile dysfunction 2023    HTN (hypertension), benign 2023        Past Medical History:   Diagnosis Date    Hyperlipidemia     Osteoarthritis      Past Surgical History:   Procedure Laterality Date    ANTERIOR CRUCIATE LIGAMENT REPAIR Right     COLONOSCOPY      TOTAL KNEE ARTHROPLASTY Right 2025    RIGHT KNEE TOTAL KNEE  ARTHROPLASTY. REMOVAL OF DEEP IMPLANT performed by Sam Hutchison MD at [facility] [place]       Chart Reviewed: Yes  Family/Caregiver Present: No    Restrictions:  Restrictions/Precautions: Fall Risk;Weight Bearing  Lower Extremity Weight Bearing Restrictions  Right Lower Extremity Weight Bearing: Weight Bearing As Tolerated  Position Activity Restriction  Other Position/Activity Restrictions: Immobilizer DCd  with +SLR    SUBJECTIVE:   Subjective: I have questions about getting up the stairs with crutches.    Pain  Pain  Pre-Pain: 0  Post-Pain: 2  Pain Location:

## 2025-05-31 NOTE — PLAN OF CARE
Care plan still ongoing.    Electronically signed by Alexsander Najera RN on 5/31/25 at 11:23 AM EDT

## 2025-05-31 NOTE — DISCHARGE SUMMARY
Hospital Medicine Discharge Summary    Allen Zambrano  :  1955  MRN:  [id number]    Admit date:  2025  Discharge date:  2025    Admitting Physician:  Fredy Braxton MD  Primary Care Physician:  Eugene Youssef MD          Hospital Course:     69 y.o., male with PMH HTN who presented as a direct admit from rehab for SVT. He was initially admitted for elective R total knee replacement when he developed SVT. Management while inpatient as described below:       SVT   -started sotalol  -Echo  showing normal EF, some diastolic dysfunction  -started on cardizem gtt, now on PO cardizem        S/p R total knee arthroplasty  -PRN oxycodone  -outpatient PT/OT      Exam on discharge:   BP (!) 145/67   Pulse 73   Temp 97.9 °F (36.6 °C) (Oral)   Resp 17   Ht 1.778 m (5' 10\")   Wt 102.6 kg (226 lb 4.8 oz)   SpO2 99%   BMI 32.47 kg/m²   General appearance: No apparent distress, appears stated age and cooperative.  HEENT: Pupils equal, round, and reactive to light. Conjunctivae/corneas clear.  Neck: Supple, with full range of motion. No jugular venous distention. Trachea midline.  Respiratory:  Normal respiratory effort. Clear to auscultation, bilaterally without Rales/Wheezes/Rhonchi.  Cardiovascular: Regular rate and rhythm with normal S1/S2 without murmurs, rubs or gallops.  Abdomen: Soft, non-tender, non-distended with normal bowel sounds.  Musculoskeletal: No clubbing, cyanosis or edema bilaterally.  Full range of motion without deformity.  Skin: Skin color, texture, turgor normal.  No rashes or lesions.  Neuro: Non Focal. Symetrical motor and tone. Nl Comprehension, Alert,awake and oriented. NL CN. Symetrical tone and reflexes.  Psychiatric: Alert and oriented, thought content appropriate, normal insight  Capillary Refill: Brisk,< 3 seconds   Peripheral Pulses: +2 palpable, equal bilaterally     Patient was seen by the following consultants   Consults:  IP CONSULT TO CASE MANAGEMENT  IP CONSULT TO  Phone: [phone]   aspirin 81 MG EC tablet  coenzyme Q10 100 MG Caps capsule  dilTIAZem 180 MG extended release capsule  oxyCODONE 5 MG immediate release tablet  sotalol 80 MG tablet         Disposition:   If discharged to Home, Any [facility] needs that were indicated and/or required as been addressed and set up by Social Work.     Condition at discharge: good     Activity: activity as tolerated    Total time taken for discharging this patient: 40 minutes. Greater than 70% of time was spent focused exclusively on this patient. Time was taken to review chart, discuss plans with consultants, reconciling medications, discussing plan answering questions with patient.     Signed:  Fredy Braxton MD  5/31/2025, 2:54 PM  ----------------------------------------------------------------------------------------------------------------------    Allen Zambrano

## 2025-05-31 NOTE — FLOWSHEET NOTE
Pt is in bed resting comfortably, watching TV. He is A/O/ x 4, Independent, uses a walker. He takes his po medications whole w/ water. Last BM on 05/30/2025, no c/o constipation, abd pain, or diarrhea. Lungs are clr/dim POX on RA at 98%, no c/o SOB or difficulties breathing. Pt has a 22 ga IV in his Right arm that is clean, dry, and intact. He has a Right knee incision that is ANUSHA no drainage, well approximated, +1 palpable pedal pulse, +1 edema, (+) sensation. Left hand DSG chg completed. Wound on Left hand, applied Bacitracin and Bactroban ointment and covered w/ bandaide. Call light is w/in reach.    2300 - Pt c/o pain to his Right leg, rates his pain at 8/10 and throbbing. He received 1 PRN 5 mg Oxycodone. Will reassess in 1 hr.    0000 - Reassessed 1 hr later, Pt is in bed sleeping.

## 2025-05-31 NOTE — PROGRESS NOTES
No chief complaint on file.      SUBJECTIVE:   3/21/2025  Patient lying in bed  Denies chest pain or shortness of breath  Telemetry sinus rhythm, no alarms  EKG this morning sinus rhythm QTc 450  Okay to discharge patient home from cardiology standpoint      Past Medical History:   Diagnosis Date    Hyperlipidemia     Osteoarthritis      Patient Active Problem List   Diagnosis    Status post total knee replacement, right    Impaired mobility ADLs dt RTKA    Achilles tendinitis of left lower extremity    Dyslipidemia    Erectile dysfunction    Gastroesophageal reflux disease without esophagitis    HTN (hypertension), benign    Obstructive sleep apnea    PSVT (paroxysmal supraventricular tachycardia)    Primary osteoarthritis of right knee    Seasonal allergies    Post-op pain    Hyperglycemia due to type 2 diabetes mellitus (HCC)    Ventricular tachycardia (HCC)    IV site infection    Supraventricular tachycardia       Current Facility-Administered Medications   Medication Dose Route Frequency Provider Last Rate Last Admin    dilTIAZem (CARDIZEM CD) extended release capsule 180 mg  180 mg Oral Daily Emory Hansen DO   180 mg at 05/31/25 1036    acetaminophen (TYLENOL) tablet 650 mg  650 mg Oral Q6H Fredy Braxton MD   650 mg at 05/31/25 1047    oxyCODONE (ROXICODONE) immediate release tablet 2.5 mg  2.5 mg Oral Q4H PRN Fredy Braxton MD   2.5 mg at 05/29/25 1525    Or    oxyCODONE (ROXICODONE) immediate release tablet 5 mg  5 mg Oral Q4H PRN Fredy Braxton MD   5 mg at 05/30/25 2256    cyclobenzaprine (FLEXERIL) tablet 10 mg  10 mg Oral TID PRN Fredy Braxton MD   10 mg at 05/29/25 2134    sennosides-docusate sodium (SENOKOT-S) 8.6-50 MG tablet 1 tablet  1 tablet Oral BID Fredy Braxton MD   1 tablet at 05/31/25 1036    aspirin EC tablet 81 mg  81 mg Oral BID Fredy Braxton MD   81 mg at 05/31/25 1036    aluminum & magnesium hydroxide-simethicone (MAALOX PLUS) 200-200-20 MG/5ML suspension 15 mL

## 2025-06-01 NOTE — PROGRESS NOTES
Physical Therapy  Facility/Department: [facility] MED SURG W173/W173-01  Physical Therapy Discharge      NAME: Allen Zambrano    : 1955 (69 y.o.)  MRN: [id number]    Account: [id number]  Gender: male      Patient has been discharged from acute care hospital. DC patient from current PT program.      Electronically signed by Norma Kebede PT on 25 at 12:59 PM EDT

## 2025-06-02 ENCOUNTER — TELEPHONE (OUTPATIENT)
Dept: ORTHOPEDIC SURGERY | Facility: CLINIC | Age: 70
End: 2025-06-02
Payer: MEDICARE

## 2025-06-02 ENCOUNTER — PATIENT OUTREACH (OUTPATIENT)
Dept: PRIMARY CARE | Facility: CLINIC | Age: 70
End: 2025-06-02
Payer: MEDICARE

## 2025-06-02 LAB
EKG ATRIAL RATE: 78 BPM
EKG DIAGNOSIS: NORMAL
EKG P AXIS: 58 DEGREES
EKG P-R INTERVAL: 156 MS
EKG Q-T INTERVAL: 394 MS
EKG QRS DURATION: 114 MS
EKG QTC CALCULATION (BAZETT): 449 MS
EKG R AXIS: -51 DEGREES
EKG T AXIS: 64 DEGREES
EKG VENTRICULAR RATE: 78 BPM

## 2025-06-02 RX ORDER — OXYCODONE HYDROCHLORIDE 5 MG/1
5 CAPSULE ORAL EVERY 4 HOURS PRN
COMMUNITY

## 2025-06-02 RX ORDER — UBIDECARENONE 100 MG
100 CAPSULE ORAL DAILY
COMMUNITY

## 2025-06-02 RX ORDER — ASPIRIN 81 MG/1
81 TABLET ORAL DAILY
COMMUNITY

## 2025-06-02 RX ORDER — DILTIAZEM HYDROCHLORIDE 180 MG/1
180 CAPSULE, COATED, EXTENDED RELEASE ORAL DAILY
COMMUNITY

## 2025-06-02 RX ORDER — SOTALOL HYDROCHLORIDE 80 MG/1
80 TABLET ORAL EVERY 12 HOURS
COMMUNITY

## 2025-06-02 NOTE — PROGRESS NOTES
Discharge Facility: [facility]  Discharge Diagnosis: S/P Total Right Knee Replacement, SVT  Admission Date: 5/28/2025  Discharge Date: 5/31/2025    Pt was admitted to [facility] Inpatient Rehab 5/26-5/28/2025 and was transferred to hospital 5/28/2025.    Pt was admitted to [facility] 5/23-5/26/2025 for osteoarthritis of right knee, s/p 5/23 right total knee arthroplasty and removal of deep implant with Dr. Arias. Pt was discharged to inpt rehab.    PCP Appointment Date:  -6/5/2025 1230    Specialist Appointment Date:   -6/5/2025 1330 ortho post op  -6/6/2025 1400 pt evaluation    Hospital Encounter and Summary Linked: Yes    Hospital Encounter     Hospital Encounter      Hospital Encounter     See discharge assessment below for further details    Wrap Up  Wrap Up Additional Comments: Pt was admitted to [facility] 5/23-5/26/2025 s/p right total knee arthroplasty and removal of deep implant with Dr. Arias 5/23. Pt was admitted to inpt rehab 5/26-5/28/2025 and transferred 5/28-5/31/2025 s/p total right knee replacement and SVT. Pt discharged with prescriptions for aspirin, sotalol, cardizem, coenzyme, and oxycodone; pt denies questions or issues with medication. Pt reports understanding of discharge instructions. He reports he is waiting on a call back from ortho office. Verified pt upcoming PCP appt 6/5/2025 1230. Verified pt post op appt 6/5/2025 1330. Pt has appt for outpatient PT eval 6/6/2025 1400. Pt denies any questions, needs, or concerns at this time. He is encouraged to call if questions or needs arise. (6/2/2025 12:35 PM)  Call End Time: 1243 (6/2/2025 12:35 PM)    Engagement  Call Start Time: 1235 (6/2/2025 12:35 PM)    Medications  Medications reviewed with patient/caregiver?: Yes (new prescriptions reviewed; aspirin, sotalol, cardizem, coenzyme, oxycodone) (6/2/2025 12:35 PM)  Is the patient having any side effects  they believe may be caused by any medication additions or changes?: No (6/2/2025 12:35 PM)  Does the patient have all medications ordered at discharge?: Yes (6/2/2025 12:35 PM)  Care Management Interventions: No intervention needed (6/2/2025 12:35 PM)  Is the patient taking all medications as directed (includes completed medication regime)?: Yes (6/2/2025 12:35 PM)    Appointments  Does the patient have a primary care provider?: Yes (6/2/2025 12:35 PM)  Care Management Interventions: Verified appointment date/time/provider (6/2/2025 12:35 PM)  Has the patient kept scheduled appointments due by today?: Yes (6/2/2025 12:35 PM)    Self Management  Has home health visited the patient within 72 hours of discharge?: Not applicable (6/2/2025 12:35 PM)    Patient Teaching  Does the patient have access to their discharge instructions?: Yes (6/2/2025 12:35 PM)  Care Management Interventions: Reviewed instructions with patient (6/2/2025 12:35 PM)  What is the patient's perception of their health status since discharge?: Improving (6/2/2025 12:35 PM)  Is the patient/caregiver able to teach back the hierarchy of who to call/visit for symptoms/problems? PCP, Specialist, Home Health nurse, Urgent Care, ED, 911: Yes (6/2/2025 12:35 PM)

## 2025-06-02 NOTE — TELEPHONE ENCOUNTER
Patient called and left a  asking for a return call.  He is post op right TKA on 05-23-25 and has questions about his follow up care and what he should or should not be doing.  He can be reached at [phone].

## 2025-06-03 LAB
EKG ATRIAL RATE: 150 BPM
EKG ATRIAL RATE: 178 BPM
EKG ATRIAL RATE: 65 BPM
EKG ATRIAL RATE: 66 BPM
EKG ATRIAL RATE: 77 BPM
EKG ATRIAL RATE: 78 BPM
EKG ATRIAL RATE: 82 BPM
EKG ATRIAL RATE: 88 BPM
EKG ATRIAL RATE: 91 BPM
EKG DIAGNOSIS: NORMAL
EKG P AXIS: 10 DEGREES
EKG P AXIS: 15 DEGREES
EKG P AXIS: 28 DEGREES
EKG P AXIS: 37 DEGREES
EKG P AXIS: 60 DEGREES
EKG P AXIS: 72 DEGREES
EKG P-R INTERVAL: 154 MS
EKG P-R INTERVAL: 158 MS
EKG P-R INTERVAL: 162 MS
EKG P-R INTERVAL: 164 MS
EKG P-R INTERVAL: 166 MS
EKG P-R INTERVAL: 168 MS
EKG Q-T INTERVAL: 274 MS
EKG Q-T INTERVAL: 286 MS
EKG Q-T INTERVAL: 288 MS
EKG Q-T INTERVAL: 376 MS
EKG Q-T INTERVAL: 376 MS
EKG Q-T INTERVAL: 382 MS
EKG Q-T INTERVAL: 386 MS
EKG Q-T INTERVAL: 396 MS
EKG Q-T INTERVAL: 396 MS
EKG QRS DURATION: 100 MS
EKG QRS DURATION: 100 MS
EKG QRS DURATION: 104 MS
EKG QRS DURATION: 112 MS
EKG QRS DURATION: 116 MS
EKG QRS DURATION: 116 MS
EKG QRS DURATION: 118 MS
EKG QRS DURATION: 118 MS
EKG QRS DURATION: 120 MS
EKG QTC CALCULATION (BAZETT): 411 MS
EKG QTC CALCULATION (BAZETT): 435 MS
EKG QTC CALCULATION (BAZETT): 448 MS
EKG QTC CALCULATION (BAZETT): 450 MS
EKG QTC CALCULATION (BAZETT): 454 MS
EKG QTC CALCULATION (BAZETT): 460 MS
EKG QTC CALCULATION (BAZETT): 462 MS
EKG QTC CALCULATION (BAZETT): 464 MS
EKG QTC CALCULATION (BAZETT): 465 MS
EKG R AXIS: -39 DEGREES
EKG R AXIS: -49 DEGREES
EKG R AXIS: -51 DEGREES
EKG R AXIS: -51 DEGREES
EKG R AXIS: -53 DEGREES
EKG R AXIS: -53 DEGREES
EKG R AXIS: -58 DEGREES
EKG R AXIS: -58 DEGREES
EKG R AXIS: -59 DEGREES
EKG T AXIS: 118 DEGREES
EKG T AXIS: 118 DEGREES
EKG T AXIS: 165 DEGREES
EKG T AXIS: 42 DEGREES
EKG T AXIS: 45 DEGREES
EKG T AXIS: 56 DEGREES
EKG T AXIS: 64 DEGREES
EKG T AXIS: 75 DEGREES
EKG T AXIS: 77 DEGREES
EKG VENTRICULAR RATE: 156 BPM
EKG VENTRICULAR RATE: 157 BPM
EKG VENTRICULAR RATE: 173 BPM
EKG VENTRICULAR RATE: 65 BPM
EKG VENTRICULAR RATE: 77 BPM
EKG VENTRICULAR RATE: 78 BPM
EKG VENTRICULAR RATE: 82 BPM
EKG VENTRICULAR RATE: 88 BPM
EKG VENTRICULAR RATE: 91 BPM

## 2025-06-05 ENCOUNTER — HOSPITAL ENCOUNTER (OUTPATIENT)
Dept: RADIOLOGY | Facility: CLINIC | Age: 70
Discharge: HOME | End: 2025-06-05
Payer: MEDICARE

## 2025-06-05 ENCOUNTER — APPOINTMENT (OUTPATIENT)
Dept: PRIMARY CARE | Facility: CLINIC | Age: 70
End: 2025-06-05
Payer: MEDICARE

## 2025-06-05 ENCOUNTER — OFFICE VISIT (OUTPATIENT)
Dept: ORTHOPEDIC SURGERY | Facility: CLINIC | Age: 70
End: 2025-06-05
Payer: MEDICARE

## 2025-06-05 VITALS
WEIGHT: 221 LBS | DIASTOLIC BLOOD PRESSURE: 68 MMHG | RESPIRATION RATE: 18 BRPM | HEIGHT: 71 IN | SYSTOLIC BLOOD PRESSURE: 116 MMHG | TEMPERATURE: 97.6 F | BODY MASS INDEX: 30.94 KG/M2 | HEART RATE: 66 BPM

## 2025-06-05 DIAGNOSIS — R73.03 PREDIABETES: ICD-10-CM

## 2025-06-05 DIAGNOSIS — R73.9 HYPERGLYCEMIA: ICD-10-CM

## 2025-06-05 DIAGNOSIS — Z96.651 HISTORY OF RIGHT KNEE JOINT REPLACEMENT: Primary | ICD-10-CM

## 2025-06-05 DIAGNOSIS — G89.18 ACUTE POSTOPERATIVE PAIN: ICD-10-CM

## 2025-06-05 DIAGNOSIS — Z96.651 STATUS POST TOTAL KNEE REPLACEMENT, RIGHT: Primary | ICD-10-CM

## 2025-06-05 DIAGNOSIS — M17.11 PRIMARY OSTEOARTHRITIS OF RIGHT KNEE: ICD-10-CM

## 2025-06-05 DIAGNOSIS — Z96.651 STATUS POST TOTAL KNEE REPLACEMENT, RIGHT: ICD-10-CM

## 2025-06-05 DIAGNOSIS — I47.10 PAROXYSMAL SVT (SUPRAVENTRICULAR TACHYCARDIA): ICD-10-CM

## 2025-06-05 LAB — POC HEMOGLOBIN A1C: 6.1 % (ref 4.2–6.5)

## 2025-06-05 PROCEDURE — 1159F MED LIST DOCD IN RCRD: CPT | Performed by: FAMILY MEDICINE

## 2025-06-05 PROCEDURE — 99495 TRANSJ CARE MGMT MOD F2F 14D: CPT | Performed by: FAMILY MEDICINE

## 2025-06-05 PROCEDURE — 99212 OFFICE O/P EST SF 10 MIN: CPT | Performed by: PHYSICIAN ASSISTANT

## 2025-06-05 PROCEDURE — 3078F DIAST BP <80 MM HG: CPT | Performed by: FAMILY MEDICINE

## 2025-06-05 PROCEDURE — 83036 HEMOGLOBIN GLYCOSYLATED A1C: CPT | Performed by: FAMILY MEDICINE

## 2025-06-05 PROCEDURE — 73560 X-RAY EXAM OF KNEE 1 OR 2: CPT | Mod: RIGHT SIDE | Performed by: ORTHOPAEDIC SURGERY

## 2025-06-05 PROCEDURE — 73560 X-RAY EXAM OF KNEE 1 OR 2: CPT | Mod: RT

## 2025-06-05 PROCEDURE — 3074F SYST BP LT 130 MM HG: CPT | Performed by: FAMILY MEDICINE

## 2025-06-05 PROCEDURE — 1036F TOBACCO NON-USER: CPT | Performed by: FAMILY MEDICINE

## 2025-06-05 PROCEDURE — 3008F BODY MASS INDEX DOCD: CPT | Performed by: FAMILY MEDICINE

## 2025-06-05 RX ORDER — CYCLOBENZAPRINE HCL 10 MG
10 TABLET ORAL 3 TIMES DAILY
Qty: 30 TABLET | Refills: 0 | Status: SHIPPED | OUTPATIENT
Start: 2025-06-05 | End: 2025-07-05

## 2025-06-05 RX ORDER — OXYCODONE AND ACETAMINOPHEN 5; 325 MG/1; MG/1
1 TABLET ORAL EVERY 6 HOURS PRN
Qty: 28 TABLET | Refills: 0 | Status: SHIPPED | OUTPATIENT
Start: 2025-06-05 | End: 2025-06-12

## 2025-06-05 NOTE — ASSESSMENT & PLAN NOTE
This 69-year-old male is 13 days postop from elective right knee arthroplasty.  The surgery went without complication.  He is using aspirin daily for 30 days after surgery for DVT prophylaxis.  He is doing extremely well in physical therapy and is ambulating short distances in his home.  His range of motion is excellent.  He will keep follow-up with orthopedic surgery to reassess and make recommendations for the surgical wound.

## 2025-06-05 NOTE — ASSESSMENT & PLAN NOTE
This was a complication in the recovery period of surgery.  He saw cardiology in the hospital and had an echocardiogram which was normal.  No acute coronary syndrome was identified.  He was stabilized with Cardizem and his beta-blocker switched to sotalol.  His current heart rate is excellent, he is asymptomatic, and his blood pressure is on the low side at 116/68.  I did advise that he get a blood pressure cuff and monitor blood pressure.  If he does drop more than he is now we would want to adjust his dosages downward.

## 2025-06-05 NOTE — PROGRESS NOTES
"Subjective   Dinh Parisi Jr. \"Rich\" is a 69 y.o. male who presents for Hospital Follow-up.     HPI         Discharge Facility: [facility]  Discharge Diagnosis: S/P Total Right Knee Replacement, SVT  Admission Date: 5/28/2025  Discharge Date: 5/31/2025     Pt was admitted to [facility] 5/26-5/28/2025 and was transferred to hospital 5/28/2025.     Pt was admitted to [facility] 5/23-5/26/2025 for osteoarthritis of right knee, s/p 5/23 right total knee arthroplasty and removal of deep implant with Dr. Arias. Pt was discharged to inpt rehab.  His recovery was complicated by a course of exacerbated SVT with a heart rate at 170.  He was treated with a Cardizem drip and ultimately transition to oral Cardizem.  His beta-blocker switched to sotalol and he has done well since.  He is not having any lightheadedness or dizzy spells.  Visit Vitals  /68   Pulse 66   Temp 36.4 °C (97.6 °F)   Resp 18      Objective   Physical Exam  Constitutional:       Appearance: Normal appearance.   HENT:      Head: Normocephalic.   Eyes:      Conjunctiva/sclera: Conjunctivae normal.   Cardiovascular:      Rate and Rhythm: Normal rate and regular rhythm.      Heart sounds: Normal heart sounds.   Pulmonary:      Effort: Pulmonary effort is normal.      Breath sounds: Normal breath sounds.   Musculoskeletal:      Cervical back: Neck supple.      Right lower leg: No edema.      Left lower leg: No edema.   Skin:     General: Skin is warm and dry.   Neurological:      Mental Status: He is alert.       No data recorded     No data recorded   No data recorded   Assessment & Plan  History of right knee joint replacement  This 69-year-old male is 13 days postop from elective right knee arthroplasty.  The surgery went without complication.  He is using aspirin daily for 30 days after surgery for DVT prophylaxis.  He is doing extremely well in physical therapy and is " ambulating short distances in his home.  His range of motion is excellent.  He will keep follow-up with orthopedic surgery to reassess and make recommendations for the surgical wound.       Paroxysmal SVT (supraventricular tachycardia)  This was a complication in the recovery period of surgery.  He saw cardiology in the hospital and had an echocardiogram which was normal.  No acute coronary syndrome was identified.  He was stabilized with Cardizem and his beta-blocker switched to sotalol.  His current heart rate is excellent, he is asymptomatic, and his blood pressure is on the low side at 116/68.  I did advise that he get a blood pressure cuff and monitor blood pressure.  If he does drop more than he is now we would want to adjust his dosages downward.       Hyperglycemia    Orders:    POCT glycosylated hemoglobin (Hb A1C) manually resulted    Prediabetes  He recorded elevated blood sugar readings in the hospital.  However, an A1c today in the office is 6.1%.  He is not in the diabetes range but this is in the prediabetes range so we will continue to monitor going forward in the future.  Weight management and healthy lower carbohydrate diet will be important in preventing the progression to diabetes.          During the course of this visit I did carefully review hospital records, laboratory work, echocardiogram, cardiology consults, and orthopedic surgery notes.    Please excuse any errors in grammar or translation related to this dictation. Voice recognition software was utilized to prepare this document.

## 2025-06-05 NOTE — ASSESSMENT & PLAN NOTE
He recorded elevated blood sugar readings in the hospital.  However, an A1c today in the office is 6.1%.  He is not in the diabetes range but this is in the prediabetes range so we will continue to monitor going forward in the future.  Weight management and healthy lower carbohydrate diet will be important in preventing the progression to diabetes.

## 2025-06-05 NOTE — PROGRESS NOTES
History of present illness patient is status post right total knee replacement after removal of old ACL hardware.  Patient is ambulating very well.  He is doing physical therapy and is using his walker.      Physical exam:      General: No acute distress or breathing difficulty or discomfort, pleasant and cooperative with the examination.    Extremities: Right knee incisions clean dry well-healing and intact.  He has 2+ effusion but no drainage or evidence of infection good straight leg raise with flexion back to 95 degrees      Diagnostic studies: X-rays 2 views right knee show well aligned well-positioned right total knee replacement as read by Dr. Gerardo Arias    Impression: Status post right total knee replacement    Plan: Patient will continue physical therapy.  Continue to work on gait balance ambulation.  Follow-up in 5 weeks with x-rays 2 views of the right knee and range of motion check.

## 2025-06-06 ENCOUNTER — PATIENT OUTREACH (OUTPATIENT)
Dept: PRIMARY CARE | Facility: CLINIC | Age: 70
End: 2025-06-06
Payer: MEDICARE

## 2025-06-06 ENCOUNTER — EVALUATION (OUTPATIENT)
Dept: PHYSICAL THERAPY | Facility: CLINIC | Age: 70
End: 2025-06-06
Payer: MEDICARE

## 2025-06-06 DIAGNOSIS — M17.11 OSTEOARTHRITIS OF RIGHT KNEE, UNSPECIFIED OSTEOARTHRITIS TYPE: Primary | ICD-10-CM

## 2025-06-06 PROCEDURE — 97161 PT EVAL LOW COMPLEX 20 MIN: CPT | Mod: GP

## 2025-06-06 PROCEDURE — 97110 THERAPEUTIC EXERCISES: CPT | Mod: GP

## 2025-06-06 NOTE — PROGRESS NOTES
Physical Therapy Evaluation    Patient Name: Dinh Parisi Jr.  MRN: [id number]  Time Calculation  Start Time: 1345  Stop Time: 1423  Time Calculation (min): 38 min  PT Evaluation Time Entry  PT Evaluation (Low) Time Entry: 8  PT Therapeutic Procedures Time Entry  Therapeutic Exercise Time Entry: 30                   Current Problem  1. Osteoarthritis of right knee, unspecified osteoarthritis type          Insurance    Insurance reviewed   Visit number: 1  Approved number of visits: BMN  MEDICARE 2410 ($0 USED ) COPAY 0 DED 80  COVERAGE 100 OOP (MET) NO SECONDARY LISTED  NO AUTH REQ [id number]/AL       Subjective   General:  Pt reports to the clinic with complaint of R knee pain s/p R TKA on 25 with Dr. Gerardo Arias. He previously had ACLR on same knee and they took this hardware out. He did have paroxysmal SVT during surgery. He went to inpt rehab where PSVT happened again. Pt denies radicular Sx. His R knee started bothering him about a year ago after a fall and was receiving multiple injections. Pt would like to return to work and drive following completion of PT. Patient denies any abnormal/calf pain, acute/abnormal lower leg swelling, shortness of breath or any overt signs of DVT/PE. Patient also denies any acute irritation of surgical site, no odorous/abnormally colored discharge or overt signs of infection. Denies any falls. He is currently walking with a FWW. Pt denies any painful popping, clicking, catching or buckling.  Pt denies any /GI changes, fever/night sweats/pain, loss of appetite, or unintentional weight loss/gain.     Occupation:      Lifestyle: gardening, house at [employer], biking, boating     Living Environment: lives with family         Pain:  Description of Symptoms:    Pain: ache   Location: R knee  Severity: current 2-3/10  Best 2-3/10  Worst 8/10   Intensity Variation: has improved in some ways and worsened in others  Duration of Pain: continuous  Aggravating Factors:  standing, prolonged sitting, walking, twisting, going up stairs, going down stairs, and squatting  Relieving Factors: ice, use of a walker, and Rx meds      Reviewed medical screening form with pt and medical screening assessed      Objective   Hip Musculoskeletal Exam    Strength    Right      Flexion: 4+/5. Flexion is affected by pain.       Internal rotation: 4/5. Internal rotation is affected by pain.       External rotation: 4/5. External rotation is affected by pain.       Abduction: 4-/5.     Left      Left hip strength is normal.       Knee Musculoskeletal Exam    Range of Motion    Right      Active extension: -10      Right knee passive extension: -8.      Right knee active flexion: 107.      Passive flexion: 110    Left      Left knee range of motion is normal and full.      Strength    Right      Extension: 4+/5.       Flexion: 4+/5.     Left      Left knee strength is normal.       Outcome Measures:  Other Measures  Lower Extremity Funtional Score (LEFS): 19     Treatment:   Measures for eval     EDUCATION/HEP:  Access Code: [email or web address]  URL: [email or web address]  Date: 06/06/2025  Prepared by: Linda Bai    Exercises  - Supine Quad Set  - 1 x daily - 4 x weekly - 2 sets - 10 reps - 5 hold  - Active Straight Leg Raise with Quad Set  - 1 x daily - 4 x weekly - 2 sets - 10 reps  - Supine Knee Extension Strengthening  - 1 x daily - 4 x weekly - 2 sets - 10 reps - 5 hold  - Clamshell  - 1 x daily - 4 x weekly - 2 sets - 10 reps  - Seated Long Arc Quad  - 1 x daily - 4 x weekly - 2 sets - 10 reps  - Supine Knee Extension Stretch on Towel Roll  - 2 x daily - 4 x weekly - 3-5 mins hold  - Standing Hip Abduction with Counter Support  - 1 x daily - 4 x weekly - 2 sets - 10 reps  - Standing Hip Extension with Unilateral Counter Support  - 1 x daily - 4 x weekly - 2 sets - 10 reps    Assessment:  Pt is here for an evaluation of R knee pain s/p R TKA on 5/23/25 with Dr. Gerardo Arias. Pt  demonstrates diminished range with pain from -10 to 107 degrees. Strength is diminished slight in RLE hip flexion, but overall doing very well following surgery. Pt needed frequent cueing to rest with leg in neutral and to continue doing this at home to work on extension. Pt stands to benefit from skilled PT in order to improve R knee ROM, RLE strength, stability, gait mechanics, and balance in order to return to PLOF.       Clinical Presentation: Stable     Level of Complexity: Low     Goals:  Pt will be independent with advanced HEP   Pt will increase Richard LE strength 4+-5/5 including good eccentric quad to perform all functional mobility  Pt will demo R knee AROM 0-120 deg to perform all functional mobility  Pt will negotiate flight of stairs mod I reciprocally without increased knee pain    Pt will ambulate without assistive device over community distance without increase in knee pain  Pt will ambulate community distances independent over varying surfaces/inclines without increased R knee pain                    Pt will improve LEFS score by at least 9 points (MCID) to indicate significant improvement in functional abilities.       Plan  2x/week for 10 visits     Skilled therapeutic intervention to address the above mentioned physical and functional impairments and limitations including, but not limited to: patient education, therapeutic exercise, therapeutic activity, manual therapy, body mechanics training, dry needling, blood flow restriction training, instrumented soft tissue mobilization, manual soft tissue mobilization, gait retraining, biofeedback, cryotherapy, electrical stimulation, home program development, hot pack, taping, neuromuscular re-education, self-care/home management, and vasopneumatic compression.

## 2025-06-06 NOTE — PROGRESS NOTES
Confirmation of at least 2 patient identifiers.    Completed telephonic follow-up with patient after recent visit with Dr. Giraldo 6/5/2025.      Spoke to patient during outreach call.    Patient reports feeling: Improved    Patient has questions or concerns about medications: No    Have all prescribed medications been filled? Yes    Patient has necessary resources to manage their care? Yes    Patient has questions or concerns? No    Next care management follow-up approximately within one month.    Pt had ortho follow up yesterday and reports he is feeling good and that his appointments went well.   Pt will be going for PT evaluation today at 2pm.  Verified pt next ortho appt 7/10/2025 0945.    Pt denies any questions, needs, or concerns. He is encouraged to call if questions or needs arise.

## 2025-06-10 ENCOUNTER — TREATMENT (OUTPATIENT)
Dept: PHYSICAL THERAPY | Facility: CLINIC | Age: 70
End: 2025-06-10
Payer: MEDICARE

## 2025-06-10 DIAGNOSIS — M17.11 OSTEOARTHRITIS OF RIGHT KNEE, UNSPECIFIED OSTEOARTHRITIS TYPE: ICD-10-CM

## 2025-06-10 PROCEDURE — 97110 THERAPEUTIC EXERCISES: CPT | Mod: GP

## 2025-06-10 NOTE — PROGRESS NOTES
"Patient Name: Dinh Parisi Jr.  MRN: [id number]  Time Calculation  Start Time: 1316  Stop Time: 1354  Time Calculation (min): 38 min     PT Therapeutic Procedures Time Entry  Therapeutic Exercise Time Entry: 38                     Current Problem  1. Osteoarthritis of right knee, unspecified osteoarthritis type  Follow Up In Physical Therapy          Insurance  Visit number: 2  Approved number of visits: BMN  MEDICARE 2410 ($0 USED ) COPAY 0 DED 80  COVERAGE 100 OOP (MET) NO SECONDARY LISTED  NO AUTH REQ [id number]/AL     Subjective     General  Pt reports the knee is generally sore but improving. Has not gotten to his HEP as much \"as he'd like\" .   Precautions    Pain  2-3/10    Objective     118 degrees knee flexion   Lacking 9 degrees knee extension     Treatments:    Supine heel slide w/ strap:  15x5s   Supine bolster quad:  10X5S   Supine bridge:  2x10  STS:  2x15 (added 7# ball    Seated LAQ:  2x10 2#->4#  // 6\" step ups: x10 fwd, x15 lateral   // higgies:  4#, 2x5     OP EDUCATION/HEP:  Encouraged pt to work on extension stretches as a priority over the next few days     Assessment   Good tolerance to session today. Able to complete all given activities without significant discomfort. Did initially c/o pain with STS but after further repetitions noted improvement. Able to perform repeated step ups and higgies with anticipated fatigue. Overall his motion is doing well from a flexion standpoint, encouraged him to focus on extension at this juncture. Recommend continued skilled PT to progress strength and Rom of the RLE in order to restore PLOF.     Plan     Continue per POC. Strength and ROM to tolerance    "

## 2025-06-11 NOTE — PROGRESS NOTES
"Physical Therapy Treatment    Patient Name: Dinh Parisi Jr.  MRN: [id number]  Today's Date: 6/12/2025  Time Calculation  Start Time: 1054  Stop Time: 1132  Time Calculation (min): 38 min     PT Therapeutic Procedures Time Entry  Therapeutic Exercise Time Entry: 30  Manual Therapy Time Entry: 8                 Current Problem  1. Osteoarthritis of right knee, unspecified osteoarthritis type  Follow Up In Physical Therapy          Insurance:  Visit number: 3  Approved number of visits: BMN  MEDICARE 2410 ($0 USED ) COPAY 0 DED 80  COVERAGE 100 OOP (MET) NO SECONDARY LISTED  NO AUTH REQ [id number]   Precautions   none    Subjective   Subjective:   Pt reports that he has been busy doing steps and moving around.  Pain   3-4/10    Objective   120 degrees knee flexion AAROM  Lacking 9 degrees knee extension  AROM  Treatments:  Supine heel slide w/ strap:  10x5s   Supine bolster quad:  10X5s  Supine bridge:  2x10---  Seated hamstring stretch 10\"x5*  STS:  2x15 (added 7# ball  )  TKE GTB 3\"x20  SL axel step over w/ heel strike 10x2  Seated LAQ:  2x10 4#  // 6\" step ups: x10 fwd, x15 lateral (fwd only today)  // higgies:  4#, 2x5 ----    PROM R knee, patellar mobs 8'  OP EDUCATION:   Access Code: [email or web address]  URL: [email or web address]  Date: 06/12/2025  Prepared by: Jenifer Waters    Exercises  - Standing Terminal Knee Extension with Resistance  - 1 x daily - 7 x weekly - 2 sets - 10 reps - 5 hold      Assessment:   Pt had difficulty keeping the knee in extended position on the table for very long. Didn't do bridges today, as pt thought they irritated his back last visit. Pt fatigued with sit to stands. Introduced TKEs with verbal cues for not pushing back with the hips. Pt able to use good technique with axel step overs. Pt felt some increased soreness when emphasizing knee ext.    Plan:   Cont to increase knee extension and R LE strength              "

## 2025-06-12 ENCOUNTER — TREATMENT (OUTPATIENT)
Dept: PHYSICAL THERAPY | Facility: CLINIC | Age: 70
End: 2025-06-12
Payer: MEDICARE

## 2025-06-12 DIAGNOSIS — M17.11 OSTEOARTHRITIS OF RIGHT KNEE, UNSPECIFIED OSTEOARTHRITIS TYPE: Primary | ICD-10-CM

## 2025-06-12 PROCEDURE — 97140 MANUAL THERAPY 1/> REGIONS: CPT | Mod: GP,CQ

## 2025-06-12 PROCEDURE — 97110 THERAPEUTIC EXERCISES: CPT | Mod: GP,CQ

## 2025-06-17 ENCOUNTER — TREATMENT (OUTPATIENT)
Dept: PHYSICAL THERAPY | Facility: CLINIC | Age: 70
End: 2025-06-17
Payer: MEDICARE

## 2025-06-17 DIAGNOSIS — M17.11 OSTEOARTHRITIS OF RIGHT KNEE, UNSPECIFIED OSTEOARTHRITIS TYPE: ICD-10-CM

## 2025-06-17 PROCEDURE — 97110 THERAPEUTIC EXERCISES: CPT | Mod: GP,CQ

## 2025-06-17 ASSESSMENT — PAIN - FUNCTIONAL ASSESSMENT: PAIN_FUNCTIONAL_ASSESSMENT: 0-10

## 2025-06-17 ASSESSMENT — PAIN SCALES - GENERAL: PAINLEVEL_OUTOF10: 3

## 2025-06-17 NOTE — PROGRESS NOTES
"Physical Therapy Treatment    Patient Name: Dinh Parisi Jr.  MRN: [id number]  Today's Date: 6/17/2025  Time in 0100  Time out 0140  Treatment Time 40 min    Current Problem  1. Osteoarthritis of right knee, unspecified osteoarthritis type  Follow Up In Physical Therapy          Insurance:  Visit number: 4  Approved number of visits: BMN  MEDICARE 2410 ($0 USED ) COPAY 0 DED 80  COVERAGE 100 OOP (MET) NO SECONDARY LISTED  NO AUTH REQ [id number]/AL   Precautions   none    Subjective   Subjective:  Patient reports his knee is sore from sitting on a low toilet this weekend.     Pain   3/10    Objective   120 degrees knee flexion AAROM  Lacking 8 degrees knee extension  AROM    Treatments:  Supine heel slide w/ strap:  10x5s   Supine bolster quad:  10X5s  Supine bridge:  2x10---  Seated hamstring stretch 10\"x5*  STS:  2x15 ( 7# ball  )  TKE GTB 3\"x20  SL axel step over w/ heel strike 10x2  Seated LAQ:  2x10 4#  // 6\" step ups: x15 fwd, x15 lateral  // higgies:  4#, 2x5 ----    PROM R knee, patellar mobs 8'    OP EDUCATION:   Access Code: [email or web address]  URL: [email or web address]  Date: 06/12/2025  Prepared by: Jenifer Waters    Exercises  - Standing Terminal Knee Extension with Resistance  - 1 x daily - 7 x weekly - 2 sets - 10 reps - 5 hold      Assessment:  Patient continues to have poor tolerance to knee extension, mostly in supine. Continues to be challenged by step ups. Cues to avoid hip substitutions during TKE. Improved ROM following manual treatment. Will continue to benefit from PT to improve knee strength and ROM to improve functional mobility.    Plan:   Continue with knee ROM and strength to improve functional mobility.              "

## 2025-06-19 ENCOUNTER — TREATMENT (OUTPATIENT)
Dept: PHYSICAL THERAPY | Facility: CLINIC | Age: 70
End: 2025-06-19
Payer: MEDICARE

## 2025-06-19 DIAGNOSIS — M17.11 OSTEOARTHRITIS OF RIGHT KNEE, UNSPECIFIED OSTEOARTHRITIS TYPE: ICD-10-CM

## 2025-06-19 PROCEDURE — 97110 THERAPEUTIC EXERCISES: CPT | Mod: GP

## 2025-06-19 NOTE — PROGRESS NOTES
Physical Therapy Treatment    Patient Name: Dinh Parisi Jr.  MRN: [id number]  Time Calculation  Start Time: 1400  Stop Time: 1438  Time Calculation (min): 38 min     PT Therapeutic Procedures Time Entry  Therapeutic Exercise Time Entry: 38                   Insurance:  Visit number: 5  Approved number of visits: BMN  MEDICARE 2410 ($0 USED ) COPAY 0 DED 80  COVERAGE 100 OOP (MET) NO SECONDARY LISTED  NO AUTH REQ [id number]/AL     Current Problem  1. Osteoarthritis of right knee, unspecified osteoarthritis type  Follow Up In Physical Therapy          Subjective   General  Pt states that R side of back is bothering him today, but no problems in his knee today.      Pain  0/10    Objective   Knee flexion: 122 deg   Extension: -7     Treatments:  Lenin- 4 mins  Heel slides x 20   QS with heel prop x 30, 4#   HS stretch 2 x 30s   QS x 25   SLR with QS x 20   Clamshells green TB 2x 10   Standing hip ext/abd green TB 2 x 10       Assessment   Pt needed several rest breaks after pushing knee extension today. Needed cueing to not use momentum during rolling for clamshells. Pt continues to benefit from skilled PT in order to keep progressing towards their goals.         Plan   Continue current POC, progress as tolerated

## 2025-06-24 ENCOUNTER — TREATMENT (OUTPATIENT)
Dept: PHYSICAL THERAPY | Facility: CLINIC | Age: 70
End: 2025-06-24
Payer: MEDICARE

## 2025-06-24 DIAGNOSIS — M17.11 OSTEOARTHRITIS OF RIGHT KNEE, UNSPECIFIED OSTEOARTHRITIS TYPE: ICD-10-CM

## 2025-06-24 PROCEDURE — 97110 THERAPEUTIC EXERCISES: CPT | Mod: GP

## 2025-06-24 PROCEDURE — 97032 APPL MODALITY 1+ESTIM EA 15: CPT | Mod: GP

## 2025-06-24 NOTE — PROGRESS NOTES
Physical Therapy Treatment    Patient Name: Dinh Parisi Jr.  MRN: [id number]  Time Calculation  Start Time: 1400  Stop Time: 1440  Time Calculation (min): 40 min     PT Therapeutic Procedures Time Entry  Therapeutic Exercise Time Entry: 30  PT Modalities Time Entry  E-Stim (Attended) Time Entry: 10                Insurance:  Visit number: 6  Approved number of visits: BMN  MEDICARE 2410 ($0 USED ) COPAY 0 DED 80  COVERAGE 100 OOP (MET) NO SECONDARY LISTED  NO AUTH REQ [id number]/AL     Current Problem  1. Osteoarthritis of right knee, unspecified osteoarthritis type  Follow Up In Physical Therapy          Subjective   General  Pt states that compression stockings have helped his R knee out tremendously. L knee bothering him more at this time.       Pain  0/10    Objective   Knee ext: -5   Flexion: 123     Treatments:  Lenin- 4 mins     Manual:   Dry needling: Knee protocol (L) (7, 50mm) with attended E stim- 8 mins     Patient does not present with any of the following precautions/contraindications for dry needling treatment: active infection, presence of open wound, personal/family history of blood clotting disorder, active malignancy, extreme aversion to needles or previous/recent surgery in the target area. Patient provided informed consent for treatment. Patient tolerated treatment well and did not report adverse reactions. No adverse reactions were observed. Needle Placement: Area prepped and cleaned with isopropyl alcohol pad in sterile fashion.  Removed without adverse reaction. Patient educated regarding potential post-needling soreness and self-management strategies.     Knee prop extension 4#- 3 mins   HS stretch 2 x 20s  Calf stretch 2 x 20s   TKE 10# 3 x 8   Lateral tap downs 4 in box 2 x10   Anterior tap down 4 in box 2 x 6    Assessment   Pt able to tolerate dry needling for contralateral leg. Tolerating knee prop better than last visit and did not need seated breaks. Good form with tap downs and needed  some cueing to not shift weight into LLE. Pt continues to benefit from skilled PT in order to keep progressing towards their goals.       Plan   Pt continues to benefit from skilled PT in order to keep progressing towards their goals.

## 2025-06-26 ENCOUNTER — TREATMENT (OUTPATIENT)
Dept: PHYSICAL THERAPY | Facility: CLINIC | Age: 70
End: 2025-06-26
Payer: MEDICARE

## 2025-06-26 DIAGNOSIS — M17.11 OSTEOARTHRITIS OF RIGHT KNEE, UNSPECIFIED OSTEOARTHRITIS TYPE: ICD-10-CM

## 2025-06-26 PROCEDURE — 97110 THERAPEUTIC EXERCISES: CPT | Mod: GP,CQ

## 2025-06-26 NOTE — PROGRESS NOTES
"Physical Therapy Treatment    Patient Name: Dinh Parisi Jr.  MRN: [id number]  Today's Date: 6/26/2025  Time Calculation  Start Time: 1400  Stop Time: 1442  Time Calculation (min): 42 min  PT Therapeutic Procedures Time Entry  Therapeutic Exercise Time Entry: 40       Insurance:  Visit number: 7  Approved number of visits: BMN  MEDICARE 2410 ($0 USED ) COPAY 0 DED 80  COVERAGE 100 OOP (MET) NO SECONDARY LISTED  NO AUTH REQ [id number]/AL     Current Problem  1. Osteoarthritis of right knee, unspecified osteoarthritis type  Follow Up In Physical Therapy          Subjective   General   Pt. Reports he is doing well and his pain is getting better.   Precautions     Pain       Objective   -7* AROM  -4* PROM    Treatments:  Lenin 6'  QS w/ heel prop 5\"x20  SLRs w/ QS 2x10  Knee prop extension 4#- 3 mins   HS stretch 3 x 30s  Calf stretch 3 x 30s   HR/TR 2x10  TKE 10# 3 x 8   Lateral tap downs 4 in box 2 x10   Anterior tap down 4 in box x10    Assessment:   Pt. Progressing w/ all exercises well. Pt. Able to improve knee ext PROM to -4* w/ pain and tightness at end range. Pt. Stated he felt like the needling helped a lot last visit. Pt. Able to self correct improper technique while performing TKEs. Pt. Stated his LE likes to ER while performing standing exercises since it does not hurt as much that way. Pt. Will continue w/ current HEP for improving strength/ROM for returning to PLOF.     Plan:   Continue to increase strength/ROM for performing ADLs.     OP EDUCATION:       Goals:     "
98.6

## 2025-07-01 ENCOUNTER — TREATMENT (OUTPATIENT)
Dept: PHYSICAL THERAPY | Facility: CLINIC | Age: 70
End: 2025-07-01
Payer: MEDICARE

## 2025-07-01 ENCOUNTER — OFFICE VISIT (OUTPATIENT)
Dept: CARDIOLOGY | Facility: CLINIC | Age: 70
End: 2025-07-01
Payer: MEDICARE

## 2025-07-01 VITALS
SYSTOLIC BLOOD PRESSURE: 132 MMHG | HEART RATE: 68 BPM | HEIGHT: 71 IN | DIASTOLIC BLOOD PRESSURE: 80 MMHG | WEIGHT: 222 LBS | OXYGEN SATURATION: 99 % | BODY MASS INDEX: 31.08 KG/M2

## 2025-07-01 DIAGNOSIS — E78.5 DYSLIPIDEMIA: ICD-10-CM

## 2025-07-01 DIAGNOSIS — I10 BENIGN ESSENTIAL HYPERTENSION: ICD-10-CM

## 2025-07-01 DIAGNOSIS — M17.11 OSTEOARTHRITIS OF RIGHT KNEE, UNSPECIFIED OSTEOARTHRITIS TYPE: ICD-10-CM

## 2025-07-01 DIAGNOSIS — I47.10 PAROXYSMAL SVT (SUPRAVENTRICULAR TACHYCARDIA): Primary | ICD-10-CM

## 2025-07-01 PROCEDURE — 1036F TOBACCO NON-USER: CPT | Performed by: INTERNAL MEDICINE

## 2025-07-01 PROCEDURE — 93010 ELECTROCARDIOGRAM REPORT: CPT | Performed by: INTERNAL MEDICINE

## 2025-07-01 PROCEDURE — 3008F BODY MASS INDEX DOCD: CPT | Performed by: INTERNAL MEDICINE

## 2025-07-01 PROCEDURE — 97110 THERAPEUTIC EXERCISES: CPT | Mod: GP

## 2025-07-01 PROCEDURE — 3075F SYST BP GE 130 - 139MM HG: CPT | Performed by: INTERNAL MEDICINE

## 2025-07-01 PROCEDURE — 99214 OFFICE O/P EST MOD 30 MIN: CPT | Performed by: INTERNAL MEDICINE

## 2025-07-01 PROCEDURE — 99212 OFFICE O/P EST SF 10 MIN: CPT | Mod: 25

## 2025-07-01 PROCEDURE — 3079F DIAST BP 80-89 MM HG: CPT | Performed by: INTERNAL MEDICINE

## 2025-07-01 PROCEDURE — 1160F RVW MEDS BY RX/DR IN RCRD: CPT | Performed by: INTERNAL MEDICINE

## 2025-07-01 PROCEDURE — 93005 ELECTROCARDIOGRAM TRACING: CPT | Performed by: INTERNAL MEDICINE

## 2025-07-01 PROCEDURE — 1159F MED LIST DOCD IN RCRD: CPT | Performed by: INTERNAL MEDICINE

## 2025-07-01 NOTE — PROGRESS NOTES
Physical Therapy Treatment    Patient Name: Dinh Parisi Jr.  MRN: [id number]  Time Calculation  Start Time: 1435  Stop Time: 1510  Time Calculation (min): 35 min     PT Therapeutic Procedures Time Entry  Therapeutic Exercise Time Entry: 35                 Insurance:  Visit number: 8  Approved number of visits: BMN  MEDICARE 2410 ($0 USED ) COPAY 0 DED 80  COVERAGE 100 OOP (MET) NO SECONDARY LISTED  NO AUTH REQ [id number]/AL     Current Problem  1. Osteoarthritis of right knee, unspecified osteoarthritis type  Follow Up In Physical Therapy          Subjective   General  Pt states some trouble with a small hill over the weekend and descending stairs d/t contralateral leg.       Pain  0/10    Objective     Treatments:  Lenin- 4 mins     TKE red TB 3 x 10   Hip abduction/ext Green TB 2 x 10   Reverse step ups x 15 4 in box   Anterior tap downs x 15 4 in box   Lateral tap downs x 15 4 in box   HSC GTB 2 x 10   HR/TR 2x10        Assessment   Pt still demonstrating some lack in extension during exercises. Needed to use cane with tap downs to help support him. Contralateral leg is bothering him more. Pt continues to benefit from skilled PT in order to keep progressing towards their goals.       Plan   Continue current POC, progress as tolerated

## 2025-07-01 NOTE — PROGRESS NOTES
"Chief Complaint:   No chief complaint on file.     History Of Present Illness:    Dinh Parisi Jr. \"Aneudy\" is a 69 y.o. male with a history of hypertension, dyslipidemia, abnormal ECG (the right bundle branch block with LVH and repolarization abnormality), obstructive sleep apnea on CPAP, and paroxysmal SVT here for follow-up.     Was admitted to [facility] due to SVT while at rehab.  Had a right total knee replacement and was rehabbing when developed the SVT.  Cardiology was consulted.  He was given 6 mg of adenosine with a brief conversion to sinus rhythm reportedly.  SVT recurred and was given 12 mg of adenosine with transient conversion to sinus bradycardia, then sinus tachycardia, and then back to SVT by report.  He was eventually started on sotalol 80 mg twice daily and diltiazem 180 mg daily.  Since discharge she has had no recurrence of SVT.    He had previously been on metoprolol but it was stopped in the hospital in favor of sotalol and diltiazem.    He does notice that his urination has been more difficult since starting sotalol.    Echocardiogram 2025: EF 60 to 65%.  Mild concentric LVH.  Grade I diastolic dysfunction.  Mildly dilated RV with normal function.  Mild TR with RVSP 42 mmHg.  Small pericardial effusion around the RA and RV with no evidence of tamponade.    ECG 2025: Sinus rhythm with PAC.  Left anterior fascicular block.  LVH.  QRS duration 120 ms.  QTc 462.  Small delta wave again noted in II.    ECG 2025: SVT with no discernible P waves.  Delta waves noted in II.  Ventricular rate 156 bpm.    ECG 2025: SVT with incomplete right bundle branch block.  Delta wave noted in II.  LVH.  Left anterior fascicular block.     Had an ECG in 2023 with evidence of SVT     He is a third generation .     Allergies:  Patient has no known allergies.    Outpatient Medications:  Current Outpatient Medications   Medication Instructions    ascorbic acid (CHEWABLE " "VITAMIN C ORAL) Take by mouth.    aspirin 81 mg, Daily    azelastine (Astelin) 137 mcg (0.1 %) nasal spray Administer into each nostril.    cholecalciferol (VITAMIN D-3) 100 mcg, Daily RT    coenzyme Q-10 100 mg, Daily    cyclobenzaprine (FLEXERIL) 10 mg, oral, 3 times daily    dilTIAZem CD (CARDIZEM CD) 180 mg, Daily    fexofenadine-pseudoephedrine (Allegra-D)  mg 12 hr tablet 1 tablet, Every 12 hours    losartan (COZAAR) 100 mg, oral, Daily    meloxicam (MOBIC) 7.5 mg, oral, Daily    multivitamin tablet 1 tablet, Daily    rosuvastatin (CRESTOR) 40 mg, oral, Daily    sotalol (BETAPACE) 80 mg, Every 12 hours    tadalafil 20 mg, oral, Daily PRN    zinc acetate 50 mg (zinc) capsule 1 capsule, oral, Daily       Last Recorded Vitals:  Visit Vitals  /80 (BP Location: Left arm, Patient Position: Sitting, BP Cuff Size: Adult)   Pulse 68   Ht 1.803 m (5' 11\")   Wt 101 kg (222 lb)   SpO2 99%   BMI 30.96 kg/m²   Smoking Status Former   BSA 2.25 m²      LASTWT(3):   Wt Readings from Last 3 Encounters:   07/01/25 101 kg (222 lb)   06/05/25 100 kg (221 lb)   05/01/25 104 kg (230 lb)       Physical Exam:  In general: alert and in no acute distress.   HEENT: Carotid upstrokes normal with no bruits. JVP is normal.   Pulmonary: Clear to auscultation bilaterally.  Cardiovascular: S1, S2, regular. No appreciable murmurs, rubs or gallops.   Lower extremities: Warm. 2+ distal pulses. No edema.       Last Labs:  CBC -  No results for input(s): \"WBC\", \"HGB\", \"HCT\", \"PLT\", \"MCV\" in the last 8760 hours.    CMP -  Recent Labs     04/04/25  0825      K 4.1      CO2 25   ANIONGAP 10   BUN 12   CREATININE 0.76   EGFR 97     Recent Labs     04/04/25  0825   ALBUMIN 4.4   ALKPHOS 46   ALT 12   AST 14   BILITOT 0.6       LIPID PANEL -   Recent Labs     04/04/25  0825   CHOL 141   LDLCALC 72   HDL 51   TRIG 93       Recent Labs     06/05/25  1311   HGBA1C 6.1           Assessment/Plan   1) paroxysmal SVT: 2 episodes of SVT " caught by ECG at [facility] with no discernible P waves.  Ventricular rate of approximately 156 bpm.  Started on diltiazem and sotalol with no obvious recurrence.  There is a note stating that adenosine temporarily broke this but that he went back into the same SVT.    Although this raises the suspicion of AVNRT it also raises the suspicion of perhaps transient AV block with continued SVT and a slower ventricular rate.    For now I would asked that he continue on the sotalol and diltiazem at their current doses.  Consult EP for further evaluation.     2) hypertension: Blood pressure relatively well controlled.     3) dyslipidemia: Continue high intensity statin     4) abnormal ECG: High lateral ST depressions.      5) follow-up: 6 months or sooner if needed         Fab Morrow MD

## 2025-07-03 ENCOUNTER — APPOINTMENT (OUTPATIENT)
Dept: PHYSICAL THERAPY | Facility: CLINIC | Age: 70
End: 2025-07-03
Payer: MEDICARE

## 2025-07-07 NOTE — PROGRESS NOTES
Electrophysiology Office Visit     CHIEF COMPLAINT  No chief complaint on file.     Primary EP doctor: Dr FRAGA  Primary Cardiologist: Dr Morrow    EP History: SVT, HTN, RBBB, LAFB, DAVINA  3/2023 Pt seen by cardiology, Dr Morrow for long h/o palpitations. Thought to be SVT/AT. Metoprolol is adjusted including pill in pocket method. Lost to follow up from Sept 2023 till July 2025.    5/2025 Few days Post op TKR he pSVT. Cardiology consulted. IV Lopressor was tried along with vagal maneuvers x 3 with no effect. 6 mg IV adenosine with brief conversion to sinus. Repeat at 12 mg IV adenosine with transient conversion to sinus bradycardia, then sinus tachycardia, then back to SVT. Transition to Cardizem   and con't Toprol-XL 50 mg daily w/ improvement and was discharged to rehab. During rehab he had another bout of SVT w/ rate 180s. Metoprolol stopped and started Sotalol with conversion to NSR.  7/1/2025 Seen by Dr Morrow for hospital follow up and he refers to EP  Maintained on Sotalol 80 mg BID, Diltiazem 180 mg daily    HPI: 7/6/2025      Today's EKG Interpretation:     VITALS  There were no vitals filed for this visit.  Wt Readings from Last 4 Encounters:   07/01/25 101 kg (222 lb)   06/05/25 100 kg (221 lb)   05/01/25 104 kg (230 lb)   02/10/25 107 kg (235 lb)       PHYSICAL EXAM:  GENERAL:  Well developed, well nourished, in no acute distress.  NEURO/PSYCH:  No focal deficits. A&O x 3   LUNGS:  Clear to auscultation bilaterally. No wheezing, rhonci, or crackles  HEART:  RRR, no M/R/G.   EXTREMITIES:  Warm with good color, no clubbing or cyanosis.  No pitting edema.     Medical History[1]  Surgical History[2]  Social History[3]  Family History[4]  RX Allergies[5]   Current Outpatient Medications   Medication Instructions    ascorbic acid (CHEWABLE VITAMIN C ORAL) Take by mouth.    aspirin 81 mg, Daily    azelastine (Astelin) 137 mcg (0.1 %) nasal spray Administer into each nostril.    cholecalciferol (VITAMIN D-3)  100 mcg, Daily RT    coenzyme Q-10 100 mg, Daily    cyclobenzaprine (FLEXERIL) 10 mg, oral, 3 times daily    dilTIAZem CD (CARDIZEM CD) 180 mg, Daily    fexofenadine-pseudoephedrine (Allegra-D)  mg 12 hr tablet 1 tablet, Every 12 hours    losartan (COZAAR) 100 mg, oral, Daily    meloxicam (MOBIC) 7.5 mg, oral, Daily    multivitamin tablet 1 tablet, Daily    rosuvastatin (CRESTOR) 40 mg, oral, Daily    sotalol (BETAPACE) 80 mg, Every 12 hours    tadalafil 20 mg, oral, Daily PRN    zinc acetate 50 mg (zinc) capsule 1 capsule, oral, Daily      Relevant reports:   2025 ECHO  Left Ventricle: Normal left ventricular systolic function with a visually estimated EF of 60 - 65%. Left ventricle size is normal. Mildly increased wall thickness. Findings consistent with mild concentric hypertrophy. Normal wall motion. Diastolic dysfunction present with normal LV EF.     Right Ventricle: Right ventricle is mildly dilated. Normal systolic function.     Tricuspid Valve: Mild regurgitation with a centrally directed jet. Mildly elevated RVSP, consistent with mild pulmonary hypertension. RVSP is 42 mmHg.     Pericardium: Small (<1 cm) localized pericardial effusion present around the right ventricle and right atrium. No indication of cardiac tamponade.    Problem List[6]     ASSESSMENT AND PLAN  Problem List Items Addressed This Visit    None       OG Amin, PA-C             [1] No past medical history on file.  [2]   Past Surgical History:  Procedure Laterality Date    OTHER SURGICAL HISTORY  2021    Anterior cruciate ligament repair    SHOULDER Right     fracture repair with pin   [3]   Social History  Tobacco Use    Smoking status: Former     Current packs/day: 0.00     Types: Cigarettes     Quit date:      Years since quittin.5    Smokeless tobacco: Never   Vaping Use    Vaping status: Every Day   Substance Use Topics    Alcohol use: Yes     Alcohol/week: 12.0 standard drinks of alcohol      Types: 12 Cans of beer per week    Drug use: Never   [4]   Family History  Problem Relation Name Age of Onset    Other (cardiac disorder) Father      Heart attack Father      Dementia Sister     [5] No Known Allergies  [6]   Patient Active Problem List  Diagnosis    Benign essential hypertension    Erectile dysfunction    Achilles tendinitis of left lower extremity    Gastroesophageal reflux disease without esophagitis    Obstructive sleep apnea    Dyslipidemia    Paroxysmal SVT (supraventricular tachycardia)    Seasonal allergies    History of right knee joint replacement    Prediabetes    Osteoarthritis of right knee

## 2025-07-08 ENCOUNTER — PATIENT OUTREACH (OUTPATIENT)
Dept: PRIMARY CARE | Facility: CLINIC | Age: 70
End: 2025-07-08

## 2025-07-08 ENCOUNTER — APPOINTMENT (OUTPATIENT)
Dept: PHYSICAL THERAPY | Facility: CLINIC | Age: 70
End: 2025-07-08
Payer: MEDICARE

## 2025-07-08 ENCOUNTER — APPOINTMENT (OUTPATIENT)
Dept: CARDIOLOGY | Facility: CLINIC | Age: 70
End: 2025-07-08
Payer: MEDICARE

## 2025-07-08 NOTE — PROGRESS NOTES
Successful outreach to patient regarding hospitalization as patient continues TCM program.   At time of outreach call the patient feels as if their condition has improved since initial visit with PCP or specialist. Pt reports his right knee replacement has healed nicely and the swelling is just about gone. Pt reports he has been stressing out the other knee but is working with physical therapy.  Questions or concerns addressed at this time with patient.     Verified upcoming appts;  -7/10/2025 0945 ortho  -7/10/2025 1400 PT  -7/31/2025 1545 cardiology    Pt denies any current needs from PCP. Pt denies any questions, needs, or concerns. Pt encouraged to call if questions or needs arise.

## 2025-07-10 ENCOUNTER — OFFICE VISIT (OUTPATIENT)
Dept: ORTHOPEDIC SURGERY | Facility: CLINIC | Age: 70
End: 2025-07-10
Payer: MEDICARE

## 2025-07-10 ENCOUNTER — TREATMENT (OUTPATIENT)
Dept: PHYSICAL THERAPY | Facility: CLINIC | Age: 70
End: 2025-07-10
Payer: MEDICARE

## 2025-07-10 ENCOUNTER — HOSPITAL ENCOUNTER (OUTPATIENT)
Dept: RADIOLOGY | Facility: CLINIC | Age: 70
Discharge: HOME | End: 2025-07-10
Payer: MEDICARE

## 2025-07-10 DIAGNOSIS — Z96.651 STATUS POST TOTAL KNEE REPLACEMENT, RIGHT: ICD-10-CM

## 2025-07-10 DIAGNOSIS — M17.11 OSTEOARTHRITIS OF RIGHT KNEE, UNSPECIFIED OSTEOARTHRITIS TYPE: Primary | ICD-10-CM

## 2025-07-10 PROCEDURE — 97032 APPL MODALITY 1+ESTIM EA 15: CPT | Mod: GP

## 2025-07-10 PROCEDURE — 99212 OFFICE O/P EST SF 10 MIN: CPT | Performed by: PHYSICIAN ASSISTANT

## 2025-07-10 PROCEDURE — 97535 SELF CARE MNGMENT TRAINING: CPT | Mod: GP

## 2025-07-10 PROCEDURE — 73560 X-RAY EXAM OF KNEE 1 OR 2: CPT | Mod: RIGHT SIDE | Performed by: ORTHOPAEDIC SURGERY

## 2025-07-10 PROCEDURE — 73560 X-RAY EXAM OF KNEE 1 OR 2: CPT | Mod: RT

## 2025-07-10 NOTE — PROGRESS NOTES
Physical Therapy Re-Evaluation & Discharge     Patient Name: Dinh Parisi Jr.  MRN: [id number]  Time Calculation  Start Time: 1400  Stop Time: 1430  Time Calculation (min): 30 min     PT Therapeutic Procedures Time Entry  Self-Care/Home Mgmt Trainin  PT Modalities Time Entry  E-Stim (Attended) Time Entry: 10                Current Problem  1. Osteoarthritis of right knee, unspecified osteoarthritis type  Follow Up In Physical Therapy        Insurance:  Visit number: 9  Approved number of visits: BMN  MEDICARE 2410 ($0 USED ) COPAY 0 DED 80  COVERAGE 100 OOP (MET) NO SECONDARY LISTED  NO AUTH REQ [id number]/AL   Subjective   General:  Pt is here for a re-evaluation following a  R TKA on 25 with Dr. Gerardo Arias after completing 9 PT visits. He had ortho f/u before recheck today with no complaints. His only complaint that he is struggling with is descending stairs d/t his LLE and is now using a cane for his non-surgical side, since he plans on getting this one done down the line.       Pain:  At worst-0/10  Current -0/10    Reviewed medical screening form with pt and medical screening assessed    Imaging:   XR 7/10/25- hardware intact     Objective     Knee Musculoskeletal Exam  Gait    Assistive device: cane (for LLE)    Range of Motion    Right      Right knee active extension: -2.      Right knee passive extension: -2.      Active flexion: 120      Passive flexion: 125    Left      Left knee range of motion is normal and full.      Strength    Right      Extension: 5/5.       Flexion: 5/5.     Left      Left knee strength is normal.        Outcome Measures:  Other Measures  Lower Extremity Funtional Score (LEFS): 68     Treatment:   Includes measures for eval    Manual:   Dry needling: Knee protocol (L) (7, 50mm) with attended E stim- 8 mins      Patient does not present with any of the following precautions/contraindications for dry needling treatment: active infection, presence of open wound,  personal/family history of blood clotting disorder, active malignancy, extreme aversion to needles or previous/recent surgery in the target area. Patient provided informed consent for treatment. Patient tolerated treatment well and did not report adverse reactions. No adverse reactions were observed. Needle Placement: Area prepped and cleaned with isopropyl alcohol pad in sterile fashion.  Removed without adverse reaction. Patient educated regarding potential post-needling soreness and self-management strategies.     EDUCATION/HEP:  Continue current HEP independently to maintain Sx     Assessment:  Pt is here for an evaluation of R knee pain s/p R TKA on 5/23/25 with Dr. Gerardo Arias. Pt demonstrates diminished range from -2 to 120 degrees, this is the same as his contralateral leg for extension. Strength WFL for RLE. Will continue HEP independently to maintain Sx. Able to tolerate dry needling for contralateral leg without adverse reactions. Pt no longer stands to benefit from skilled PT d/t R knee goals met.     Clinical Presentation: Stable    Level of Complexity: Low     Goals:  Pt will be independent with advanced HEP. MET    Pt will increase Richard LE strength 4+-5/5 including good eccentric quad to perform all functional mobility. MET   Pt will demo R knee AROM 0-120 deg to perform all functional mobility. MET  Pt will negotiate flight of stairs mod I reciprocally without increased knee pain. MET    Pt will ambulate without assistive device over community distance without increase in knee pain. Met for RLE, progressing d/t LLE  Pt will ambulate community distances independent over varying surfaces/inclines without increased R knee pain. MET                     Pt will improve LEFS score by at least 9 points (MCID) to indicate significant improvement in functional abilities.      Plan  Discharge d/t R knee goals met     Skilled therapeutic intervention to address the above mentioned physical and functional  impairments and limitations including, but not limited to: patient education, therapeutic exercise, therapeutic activity, manual therapy, body mechanics training, dry needling, blood flow restriction training, instrumented soft tissue mobilization, manual soft tissue mobilization, gait retraining, biofeedback, cryotherapy, electrical stimulation, home program development, hot pack, taping, neuromuscular re-education, self-care/home management, and vasopneumatic compression.

## 2025-07-10 NOTE — PROGRESS NOTES
History of present illness patient is 6 weeks status post right total knee replacement with stemmed tibial implant.  Patient is doing extremely well.  He is in physical therapy still.      Physical exam:      General: No acute distress or breathing difficulty or discomfort, pleasant and cooperative with the examination.    Extremities: Right knee incisions clean dry and intact he has 1+ effusion no drainage or evidence of infection.  Excellent straight leg raise current range is 0 to 130 degrees      Diagnostic studies: X-rays 2 views right knee show well in position right total knee replacement with stemmed tibial implant they were read by Dr. Gerardo Arias    Impression: Status post right total knee replacement    Plan: Patient will continue low impact range of motion physical therapy program he will follow-up in 2 months at that time we will get x-rays 2 views bilateral knee as he will start workup for his left knee at that time.

## 2025-07-11 PROBLEM — I47.20 VENTRICULAR TACHYCARDIA (MULTI): Status: ACTIVE | Noted: 2025-05-27

## 2025-07-11 PROBLEM — E66.811 CLASS 1 OBESITY WITH BODY MASS INDEX (BMI) OF 30.0 TO 30.9 IN ADULT: Status: ACTIVE | Noted: 2025-07-11

## 2025-07-14 NOTE — PROGRESS NOTES
Facility/Department: [facility]  Rehabilitation Initial Assessment: Occupational Therapy  Room: R244R244-01    NAME: Allen Zambrano  : 1955  MRN: [id number]    Date of Service: 2025    Rehab Diagnosis(es): Impaired mobility and ADL's due to s/p R Total Knee Arthroplasty.  Removal of deep implant due to osteoarthritis of Right Knee  Patient Active Problem List    Diagnosis Date Noted    Hyperglycemia due to type 2 diabetes mellitus (HCC) 2025    Ventricular tachycardia (HCC) 2025    IV site infection 2025    Impaired mobility ADLs dt RTKA 2025    Post-op pain 2025    Status post total knee replacement, right 2025    Primary osteoarthritis of right knee 2024    Achilles tendinitis of left lower extremity 2023    Dyslipidemia 2023    Gastroesophageal reflux disease without esophagitis 2023    Obstructive sleep apnea 2023    Paroxysmal SVT (supraventricular tachycardia) 2023    Seasonal allergies 2023    Erectile dysfunction 2023    HTN (hypertension), benign 2023     No data found    Past Medical History:   Diagnosis Date    Hyperlipidemia     Osteoarthritis      Past Surgical History:   Procedure Laterality Date    ANTERIOR CRUCIATE LIGAMENT REPAIR Right     COLONOSCOPY      TOTAL KNEE ARTHROPLASTY Right 2025    RIGHT KNEE TOTAL KNEE  ARTHROPLASTY. REMOVAL OF DEEP IMPLANT performed by Sam Hutchison MD at [facility] OR       Restrictions:  Restrictions/Precautions  Restrictions/Precautions: Surgical protocol;Fall Risk  Position Activity Restriction  Other Position/Activity Restrictions: D/c when able to straight leg raise.  Send immobilizer home with patient. (+SLR noted )    Subjective:\"I haven't showered since last Friday.\"  General  Chart Reviewed: Yes  Family / Caregiver Present: No  Referring Practitioner: Dr Cheatham  Diagnosis: Impaired mobility and ADL's due to s/p R Total Knee Arthroplasty.  Removal of  Transfers  Shower - Transfer Type: To and From  Shower - Transfer To: Shower seat with back  Shower - Technique: Ambulating  Shower Transfers: Contact Guard      Functional Mobility:       Bed mobility and transfers:  Bed mobility  Supine to Sit: Modified independent  Sit to Supine: Modified independent  Transfers  Sit to stand: Contact guard assistance  Stand to sit: Contact guard assistance      Observation:  Observation/Palpation  Posture: Good  Observation: right knee incision with banadage in place  Edema: right LE    Orientation and Cognition:  Orientation  Overall Orientation Status: Within Functional Limits  Orientation Level: Oriented X4  Cognition  Overall Cognitive Status: WFL  Cognition Comment: comp Mod I, expression I, social I, problem I, memory I    Pt's current cognitive status is:  Comprehension: Mod I  Expression: Independent  Social Interaction: Independent  Problem Solving: Independent  Memory: Independent    Vision and Hearing:  Vision  Vision: Impaired  Vision Exceptions: Wears glasses at all times  Hearing  Hearing: Within functional limits  Vision - Basic Assessment  Prior Vision: Wears glasses all the time  Visual History: No significant visual history  Patient Visual Report: No visual complaint reported.  Visual Field Cut: No  Oculo Motor Control: WNL  Perception  Overall Perceptual Status: WFL    Visual Perception:  Perception  Overall Perceptual Status: WFL    Range of Motion:  LUE AROM (degrees)  LUE AROM : WFL  Left Hand AROM (degrees)  Left Hand AROM: WFL  RUE AROM (degrees)  RUE AROM : WFL  Right Hand AROM (degrees)  Right Hand AROM: WFL      Strength:  LUE Strength  Gross LUE Strength: WFL  L Hand General: 5/5  RUE Strength  Gross RUE Strength: WFL  R Hand General: 5/5    Quality of Movement:  Tone RUE  RUE Tone: Normotonic  Tone LUE  LUE Tone: Normotonic  Coordination  Movements Are Fluid And Coordinated: Yes    Sensation:  Sensation  Overall Sensation Status: WFL    Hand  Detail Level: Zone Plan: Oral steroids if needed Initiate Treatment: clobetasol 0.05 % topical cream BID\\ncetirizine 10 mg tablet Render In Strict Bullet Format?: No

## 2025-07-31 ENCOUNTER — APPOINTMENT (OUTPATIENT)
Dept: CARDIOLOGY | Facility: CLINIC | Age: 70
End: 2025-07-31
Payer: MEDICARE

## 2025-07-31 VITALS
WEIGHT: 222 LBS | HEART RATE: 76 BPM | BODY MASS INDEX: 31.08 KG/M2 | HEIGHT: 71 IN | OXYGEN SATURATION: 98 % | SYSTOLIC BLOOD PRESSURE: 118 MMHG | DIASTOLIC BLOOD PRESSURE: 66 MMHG

## 2025-07-31 DIAGNOSIS — I10 BENIGN ESSENTIAL HYPERTENSION: ICD-10-CM

## 2025-07-31 DIAGNOSIS — E78.5 DYSLIPIDEMIA: ICD-10-CM

## 2025-07-31 DIAGNOSIS — I47.10 PAROXYSMAL SVT (SUPRAVENTRICULAR TACHYCARDIA): Primary | ICD-10-CM

## 2025-07-31 DIAGNOSIS — G47.33 OBSTRUCTIVE SLEEP APNEA: ICD-10-CM

## 2025-07-31 PROCEDURE — G2211 COMPLEX E/M VISIT ADD ON: HCPCS | Performed by: STUDENT IN AN ORGANIZED HEALTH CARE EDUCATION/TRAINING PROGRAM

## 2025-07-31 PROCEDURE — 3008F BODY MASS INDEX DOCD: CPT | Performed by: STUDENT IN AN ORGANIZED HEALTH CARE EDUCATION/TRAINING PROGRAM

## 2025-07-31 PROCEDURE — 3078F DIAST BP <80 MM HG: CPT | Performed by: STUDENT IN AN ORGANIZED HEALTH CARE EDUCATION/TRAINING PROGRAM

## 2025-07-31 PROCEDURE — 99205 OFFICE O/P NEW HI 60 MIN: CPT | Performed by: STUDENT IN AN ORGANIZED HEALTH CARE EDUCATION/TRAINING PROGRAM

## 2025-07-31 PROCEDURE — 3074F SYST BP LT 130 MM HG: CPT | Performed by: STUDENT IN AN ORGANIZED HEALTH CARE EDUCATION/TRAINING PROGRAM

## 2025-07-31 PROCEDURE — 93000 ELECTROCARDIOGRAM COMPLETE: CPT | Performed by: STUDENT IN AN ORGANIZED HEALTH CARE EDUCATION/TRAINING PROGRAM

## 2025-07-31 PROCEDURE — 1159F MED LIST DOCD IN RCRD: CPT | Performed by: STUDENT IN AN ORGANIZED HEALTH CARE EDUCATION/TRAINING PROGRAM

## 2025-07-31 PROCEDURE — 1160F RVW MEDS BY RX/DR IN RCRD: CPT | Performed by: STUDENT IN AN ORGANIZED HEALTH CARE EDUCATION/TRAINING PROGRAM

## 2025-07-31 NOTE — PROGRESS NOTES
"    Cardiac Electrophysiology Office Visit     Referred by Dr. Morrow, Fab GALAN MD for No chief complaint on file.      Dinh Parisi Jr. is a 69 y.o. year old male patient with h/o HTN, HLD, DAVINA (CPAP), SVT presenting today to establish care    History of Present Illness  Dinh Parisi Jr. \"Rich\" is a 69 year old male with supraventricular tachycardia who presents for follow-up after recent knee surgery and subsequent episodes of SVT.    He underwent knee surgery on July 23, 2025, at [facility]. The day following the surgery, he experienced episodes of tachycardia, which he has had intermittently for over 30 years. Previously, he managed these episodes by 'breathing it away.' During the recent hospital stay, he was administered adenosine, which was ineffective, and subsequently required IV medication in the ICU. He was initially on sotalol, which was discontinued by hospital staff, and he was later placed on a regimen of sotalol and diltiazem, taking three pills daily. He reports no further episodes of tachycardia since starting this regimen.    He has a history of SVT, which was previously controlled with metoprolol for about three years until the recent knee surgery. He notes that episodes were more frequent when he was 'burning the candle at both ends' and possibly dehydrated, attributing some episodes to alcohol consumption. He was sober during the recent episode post-surgery.    He is currently taking sotalol and diltiazem for SVT, with no recent episodes reported.    His daughter, Cintia, mentions that he had an episode of what was thought to be atrial fibrillation last year, leading to an emergency room visit. She also notes his alcohol consumption as a potential trigger for his tachycardia.    Objective  Current Outpatient Medications   Medication Instructions    ascorbic acid (CHEWABLE VITAMIN C ORAL) Take by mouth.    aspirin 81 mg, Daily    azelastine (Astelin) 137 mcg (0.1 %) nasal spray " "Administer into each nostril.    cholecalciferol (VITAMIN D-3) 100 mcg, Daily RT    coenzyme Q-10 100 mg, Daily    cyclobenzaprine (FLEXERIL) 10 mg, oral, 3 times daily    dilTIAZem CD (CARDIZEM CD) 180 mg, Daily    fexofenadine-pseudoephedrine (Allegra-D)  mg 12 hr tablet 1 tablet, Every 12 hours    losartan (COZAAR) 100 mg, oral, Daily    meloxicam (MOBIC) 7.5 mg, oral, Daily    multivitamin tablet 1 tablet, Daily    rosuvastatin (CRESTOR) 40 mg, oral, Daily    sotalol (BETAPACE) 80 mg, Every 12 hours    tadalafil 20 mg, oral, Daily PRN    zinc acetate 50 mg (zinc) capsule 1 capsule, oral, Daily      Visit Vitals  /66 (BP Location: Left arm, Patient Position: Sitting)   Pulse 76   Ht 1.803 m (5' 11\")   Wt 101 kg (222 lb)   SpO2 98%   BMI 30.96 kg/m²   Smoking Status Former   BSA 2.25 m²      Physical Exam  Vitals reviewed.   Constitutional:       Appearance: Normal appearance.   HENT:      Head: Normocephalic.     Cardiovascular:      Rate and Rhythm: Normal rate and regular rhythm.   Pulmonary:      Effort: Pulmonary effort is normal. No respiratory distress.      Breath sounds: No wheezing.     Skin:     General: Skin is warm and dry.      Capillary Refill: Capillary refill takes less than 2 seconds.     Neurological:      Mental Status: He is alert.     Psychiatric:         Mood and Affect: Mood normal.           My Interpretation of Reviewed Study(s):  Results  EKG: Atrioventricular cheyanne reentrant tachycardia (AVNRT)  Echocardiogram: Ejection fraction (EF) 60-65%, mild tricuspid regurgitation (TR), small pericardial effusion, left ventricular hypertrophy (05/2025)       Assessment & Plan        Sotalol (Betapace) - High risk medications   Labs:  Recent Labs     04/04/25  0825 02/23/23  1559 02/23/23  1545 02/22/23  0749 09/09/21  0844   CREATININE 0.76  --  0.82 1.01 0.80   BUN 12  --  17 16 17   EGFR 97  --   --   --   --    K 4.1  --  3.8 4.2 3.8   MG  --  CANCELED 1.97  --   --  "       Supraventricular tachycardia (likely AVNRT)  Chronic supraventricular tachycardia, likely AVNRT, 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 Recent exacerbation post-knee surgery, possibly due to stress and dehydration. Previously controlled with metoprolol, now on sotalol and diltiazem. E[facility] from [facility] suggests AVNRT. Discussed options of continuing medication versus EP study and possible ablation. He prefers medication management due to non-life-threatening nature of SVT and current control with sotalol. EP study and ablation have an 80-90% success rate but carry risks, including potential need for a pacemaker if ablation affects normal electrical pathways. He opts to avoid invasive procedures at this time.  - Discontinue diltiazem.  - Continue sotalol 80 mg twice daily.  - Monitor kidney function and EKG every 6 months.    Essential hypertension  Essential hypertension, well-controlled with losartan. Echocardiogram shows mild thickening of the heart wall, likely due to blood pressure changes.  - Continue losartan 100 mg daily.    Hyperlipidemia  Hyperlipidemia, managed with rosuvastatin.  - Continue rosuvastatin 40 mg daily.    Return to Clinic: Patient should return to the EP Clinic in 6 months    Willem Brand MD [facility]  Cardiac Electrophysiology  [email or web address]    **Disclaimer: This note was dictated by speech recognition, and every effort has been made to prevent any error in transcription, however minor errors may be present**    This medical note was created with the assistance of artificial intelligence (AI) for documentation purposes. The content has been reviewed and confirmed by the healthcare provider for accuracy and completeness. Patient consented to the use of audio recording and use of AI during their visit.

## 2025-08-15 ENCOUNTER — PATIENT OUTREACH (OUTPATIENT)
Dept: PRIMARY CARE | Facility: CLINIC | Age: 70
End: 2025-08-15
Payer: MEDICARE

## 2025-08-26 ENCOUNTER — APPOINTMENT (OUTPATIENT)
Dept: CARDIOLOGY | Facility: CLINIC | Age: 70
End: 2025-08-26
Payer: MEDICARE

## 2025-12-04 ENCOUNTER — APPOINTMENT (OUTPATIENT)
Dept: PRIMARY CARE | Facility: CLINIC | Age: 70
End: 2025-12-04
Payer: COMMERCIAL

## 2026-01-29 ENCOUNTER — APPOINTMENT (OUTPATIENT)
Dept: CARDIOLOGY | Facility: CLINIC | Age: 71
End: 2026-01-29
Payer: MEDICARE

## (undated) DEVICE — STERILE PATIENT PROTECTIVE PAD FOR IMP® KNEE POSITIONERS & COHESIVE WRAP (10 / CASE): Brand: DE MAYO KNEE POSITIONER®

## (undated) DEVICE — SCREWDRIVER SURG OD2MM HEX FEM CANN KNEE SET S STL NXGN

## (undated) DEVICE — Device: Brand: STABLECUT®

## (undated) DEVICE — DRESSING HYDROFIBER AQUACEL AG ADVANTAGE 3.5X12 IN

## (undated) DEVICE — 4-PORT MANIFOLD: Brand: NEPTUNE 2

## (undated) DEVICE — GLOVE ORANGE PI 7 1/2   MSG9075

## (undated) DEVICE — ELECTRODE ES L275IN 275IN BLDE TIP COAT PTFE TEF W EVAC

## (undated) DEVICE — SUTURE VICRYL SZ 2-0 L36IN ABSRB UD L36MM CT-1 1/2 CIR J945H

## (undated) DEVICE — 450 ML BOTTLE OF 0.05% CHLORHEXIDINE GLUCONATE IN 99.95% STERILE WATER FOR IRRIGATION, USP AND APPLICATOR.: Brand: IRRISEPT ANTIMICROBIAL WOUND LAVAGE

## (undated) DEVICE — GLOVE ORANGE PI 8   MSG9080

## (undated) DEVICE — BLADE RMR L35MM PAT PILOT H

## (undated) DEVICE — SUTURE MONOCRYL SZ 4-0 L27IN ABSRB UD L19MM PS-2 1/2 CIR PRIM Y426H

## (undated) DEVICE — SUTURE VICRYL SZ 1 L36IN ABSRB UD L36MM CT-1 1/2 CIR J947H

## (undated) DEVICE — DRAPE,TOP,102X53,STERILE: Brand: MEDLINE

## (undated) DEVICE — SHEET, DRAPE, SPLIT, STERILE: Brand: MEDLINE

## (undated) DEVICE — TOTAL KNEE: Brand: MEDLINE INDUSTRIES, INC.

## (undated) DEVICE — 3 BONE CEMENT MIXER WITH SPATULA: Brand: MIXEVAC, ACM

## (undated) DEVICE — RECIPROCATING BLADE, DOUBLE SIDED, OFFSET  (70.0 X 0.64 X 12.6MM)

## (undated) DEVICE — SPLINT KNEE UNIV FOR LESS THAN 36IN L20IN FOAM LAM E CNTCT